# Patient Record
Sex: MALE | Race: WHITE | NOT HISPANIC OR LATINO | Employment: FULL TIME | URBAN - METROPOLITAN AREA
[De-identification: names, ages, dates, MRNs, and addresses within clinical notes are randomized per-mention and may not be internally consistent; named-entity substitution may affect disease eponyms.]

---

## 2017-01-22 ENCOUNTER — HOSPITAL ENCOUNTER (EMERGENCY)
Facility: CLINIC | Age: 37
Discharge: HOME | End: 2017-01-22
Attending: EMERGENCY MEDICINE | Admitting: EMERGENCY MEDICINE
Payer: COMMERCIAL

## 2017-01-22 VITALS
DIASTOLIC BLOOD PRESSURE: 114 MMHG | OXYGEN SATURATION: 93 % | BODY MASS INDEX: 39.58 KG/M2 | TEMPERATURE: 98.8 F | RESPIRATION RATE: 18 BRPM | WEIGHT: 315 LBS | HEART RATE: 92 BPM | SYSTOLIC BLOOD PRESSURE: 157 MMHG

## 2017-01-22 DIAGNOSIS — M54.50 ACUTE EXACERBATION OF CHRONIC LOW BACK PAIN: ICD-10-CM

## 2017-01-22 DIAGNOSIS — G89.29 ACUTE EXACERBATION OF CHRONIC LOW BACK PAIN: ICD-10-CM

## 2017-01-22 PROCEDURE — 99284 EMERGENCY DEPT VISIT MOD MDM: CPT | Performed by: EMERGENCY MEDICINE

## 2017-01-22 PROCEDURE — 99283 EMERGENCY DEPT VISIT LOW MDM: CPT

## 2017-01-22 RX ORDER — OXYCODONE AND ACETAMINOPHEN 5; 325 MG/1; MG/1
1-2 TABLET ORAL EVERY 6 HOURS PRN
Qty: 15 TABLET | Refills: 0 | Status: SHIPPED | OUTPATIENT
Start: 2017-01-22 | End: 2017-01-24

## 2017-01-22 ASSESSMENT — ENCOUNTER SYMPTOMS: BACK PAIN: 1

## 2017-01-22 NOTE — ED AVS SNAPSHOT
[facility] Emergency Department    [address]     [place] [address]    Phone:  [phone]    Fax:  [phone]                                       Sidney Plasencia   MRN: [id number]    Department:  [facility] Emergency Department   Date of Visit:  1/22/2017           Patient Information     Date Of Birth          1980        Your diagnoses for this visit were:     Acute exacerbation of chronic low back pain        You were seen by Elia Phan MD.      Follow-up Information     Follow up with Abner Moreno MD. Call in 1 day.    Specialty:  Family Practice    Contact information:    [facility]  [address] [facility] [address]  [phone]          Follow up with Brandin Das MD.    Specialty:  Neurosurgery    Why:  here as needed for spine    Contact information:    [facility] AT   [address] [facility] [address]  [place] [address]  [phone]          Discharge Instructions         Neck/Back Pain: General    Both neck and back pain are usually caused by injury to the muscles or ligaments of the spine. Sometimes the disks that separate each bone of the spine may cause pain by pressing on a nearby nerve. Back and neck pain may appear after a sudden twisting/bending force (such as in a car accident), or sometimes after a simple awkward movement. In either case, muscle spasm is often present and adds to the pain.  Acute neck and back pain usually gets better in 1 to 2 weeks. Pain related to disk disease, arthritis in the spinal joints or spinal stenosis (narrowing of the spinal canal) can become chronic and last for months or years.  Back and neck pain are common problems. Most people feel better in 1 or 2 weeks, and most of the rest in 1 to 2 months. Most people can remain active.  Symptoms  People experience and describe pain differently.    Pain can be sharp, stabbing, shooting, aching, cramping, or burning    Movement, standing, bending,  "lifting, sitting, or walking may worsen the pain    Pain can be localized to one spot or area, or it can be more generalized    Pain can spread or radiate upwards, downwards, to the front, or go down your arms    Muscle spasm may occur.  Cause  Most of the time \"mechanical problems\" with the muscles or spine cause the pain. it is usually caused by an injury, whether known or not, to the muscles or ligaments. While illnesses can cause back pain, it is usually not caused by a serious illness. Pain is usually related to physical activity, whether sports, exercise, work, or normal activity. Sometimes it can occur without an identifiable cause. This can happen simply by stretching or moving wrong, without noting pain at the time. Other causes include:    Overexertion, lifting, pushing, pulling incorrectly or too aggressively.    Sudden twisting, bending or stretching from an accident (car or fall), or accidental movement.    Poor posture    Poor conditioning, lack of regular exercise    Spinal disc disease or arthritis    Stress    Pregnancy, or illness like appendicitis, bladder or kidney infection, pelvic infections   Home care    For neck pain: Use a comfortable pillow that supports the head and keeps the spine in a neutral position. The position of the head should not be tilted forward or backward.    When in bed, try to find a position of comfort. A firm mattress is best. Try lying flat on your back with pillows under your knees. You can also try lying on your side with your knees bent up towards your chest and a pillow between your knees.    At first, do not try to stretch out the sore spots. If there is a strain, it is not like the good soreness you get after exercising without an injury. In this case, stretching may make it worse.    Avoid prolonged sitting, long car rides or travel. This puts more stress on the lower back than standing or walking.    During the first 24 to 72 hours after an injury, apply an ice " pack to the painful area for 20 minutes and then remove it for 20 minutes over a period of 60 to 90 minutes or several times a day. As a safety precaution, do not use a heating pad at bedtime. Sleeping with a heating pad can lead to skin burns or tissue damage.    Ice and heat therapies can be alternated. Talk with your health care provider about the best treatment for your back or neck pain.    Therapeutic massage can help relax the back and neck muscles without stretching them.    Be aware of safe lifting methods and do not lift anything over 15 pounds until all the pain is gone.  Medications  Talk to your health care provider before using medications, especially if you have other medical problems or are taking other medicines.    You may use acetaminophen (such as Tylenol) or ibuprofen (such as Advil or Motrin) to control pain, unless another pain medicine was prescribed. If you have chronic conditions like diabetes, liver or kidney disease, stomach ulcers,  gastrointestinal bleeding, or are taking blood thinner medications.    Be careful if you are given pain medicines, narcotics, or medication for muscle spasm. They can cause drowsiness, and can affect your coordination, reflexes, and judgment. Do not drive or operate heavy machinery.  Follow-up care  Follow up with your health care provider if your symptoms do not start to improve after one week. Physical therapy or further tests may be needed.  If X-rays were taken, they will be reviewed by a radiologist. You will be notified of any new findings that may affect your care.  Call 911  Seek emergency medical care if any of the following occur:    Trouble breathing    Confusion    Very drowsy or trouble awakening    Fainting or loss of consciousness    Rapid or very slow heart rate    Loss of bowel or bladder control  When to seek medical care  Get prompt medical attention if any of the following occur:    Pain becomes worse or spreads into your arms or  legs    Weakness, numbness or pain in one or both arms or legs    Numbness in the groin area    Difficulty walking    Fever of 100.4 F (38 C) or higher, or as directed by your healthcare provider    9009-2003 [employer]. [address] [place] [address], [place] [address]. All rights reserved. This information is not intended as a substitute for professional medical care. Always follow your healthcare professional's instructions.      Please try to handle any further pain medication needs through Dr. Moreno.      24 Hour Appointment Hotline       To make an appointment at any [facility], call [phone] ([phone]). If you don't have a family doctor or clinic, we will help you find one. [facility] clinics are conveniently located to serve the needs of you and your family.             Review of your medicines      START taking        Dose / Directions Last dose taken    oxyCODONE-acetaminophen 5-325 MG per tablet   Commonly known as:  PERCOCET   Dose:  1-2 tablet   Quantity:  15 tablet        Take 1-2 tablets by mouth every 6 hours as needed for moderate to severe pain   Refills:  0                Prescriptions were sent or printed at these locations (1 Prescription)                   Other Prescriptions                Printed at Department/Unit printer (1 of 1)         oxyCODONE-acetaminophen (PERCOCET) 5-325 MG per tablet                Orders Needing Specimen Collection     None      Pending Results     No orders found from 1/21/2017 to 1/23/2017.            Pending Culture Results     No orders found from 1/21/2017 to 1/23/2017.            Thank you for choosing [facility]       Thank you for choosing [facility] for your care. Our goal is always to provide you with excellent care. Hearing back from our patients is one way we can continue to improve our services. Please take a few minutes to complete the written survey that you may receive in the mail after you visit with us. Thank you!       "  MyChart Information     [employer] lets you send messages to your doctor, view your test results, renew your prescriptions, schedule appointments and more. To sign up, go to [email or web address] . Click on \"Log in\" on the left side of the screen, which will take you to the Welcome page. Then click on \"Sign up Now\" on the right side of the page.     You will be asked to enter the access code listed below, as well as some personal information. Please follow the directions to create your username and password.     Your access code is: [email or web address]  Expires: 3/18/2017 11:24 AM     Your access code will  in 90 days. If you need help or a new code, please call your [facility] clinic or [phone].        Care EveryWhere ID     This is your Care EveryWhere ID. This could be used by other organizations to access your [facility] medical records  [id number]        After Visit Summary       This is your record. Keep this with you and show to your community pharmacist(s) and doctor(s) at your next visit.                  "

## 2017-01-22 NOTE — ED AVS SNAPSHOT
[facility] Emergency Department    [address] [place] [address]    Phone:  [phone]    Fax:  [phone]                                       Sidney Plasencia   MRN: [id number]    Department:  [facility] Emergency Department   Date of Visit:  1/22/2017           After Visit Summary Signature Page     I have received my discharge instructions, and my questions have been answered. I have discussed any challenges I see with this plan with the nurse or doctor.    ..........................................................................................................................................  Patient/Patient Representative Signature      ..........................................................................................................................................  Patient Representative Print Name and Relationship to Patient    ..................................................               ................................................  Date                                            Time    ..........................................................................................................................................  Reviewed by Signature/Title    ...................................................              ..............................................  Date                                                            Time

## 2017-01-23 ENCOUNTER — CARE COORDINATION (OUTPATIENT)
Dept: CARE COORDINATION | Facility: CLINIC | Age: 37
End: 2017-01-23

## 2017-01-23 NOTE — PROGRESS NOTES
Clinic Care Coordination Contact  [facility]/Voicemail    Referral Source: ED Follow-Up    Clinical Data: Care Coordinator Outreach: Chronic back pain     Outreach attempted x 1.  Left message on voicemail with call back information and requested return call.    Plan: Care Coordinator will mail out care coordination introduction letter with care coordinator contact information and explanation of care coordination services. Care Coordinator will try to reach patient again in 1-2 business days.      Anusha Palacios RN BSN, PHN RN Care Coordinator  [facility]  [email or web address]  [phone]  1/23/2017 9:54 AM

## 2017-01-23 NOTE — Clinical Note
[address]   [address]    January 23, 2017    Sidney Plasencia  [address]  [facility] [address]    Dear Sidney,  I am the Clinic Care Coordinator that works with your primary care provider's clinic. I wanted to introduce myself and provide you with my contact information for you to be able to call me with any questions or concerns. Below is a description of what Clinic Care Coordination is and how I can further assist you.     The Clinic Care Coordinator role is a Registered Nurse and/or  who understands the health care system. The goal of Clinic Care Coordination is to help you manage your health and improve access to the [facility] in the most efficient manner.  The Registered Nurse can assist you in meeting your health care goals by providing education, coordinating services, and strengthening the communication among your providers. The  can assist you with financial, behavioral, psychosocial, and chemical dependency and counseling/psychiatric resources.    Please feel free to keep this letter and contact information to contact me at [phone] with any further questions or concerns that may arise. We at [facility] are focused on providing you with the highest-quality healthcare experience possible and that all starts with you.       Sincerely,     Anusha Palacios RN BSN, PHN RN Care Coordinator  [facility]  [email or web address]  [phone]  1/23/2017 9:56 AM      Enclosed: I have enclosed a copy of a 24 Hour Access Plan. This has helpful phone numbers for you to call when needed. Please keep this in an easy to access place to use as needed.

## 2017-01-23 NOTE — DISCHARGE INSTRUCTIONS
"  Neck/Back Pain: General    Both neck and back pain are usually caused by injury to the muscles or ligaments of the spine. Sometimes the disks that separate each bone of the spine may cause pain by pressing on a nearby nerve. Back and neck pain may appear after a sudden twisting/bending force (such as in a car accident), or sometimes after a simple awkward movement. In either case, muscle spasm is often present and adds to the pain.  Acute neck and back pain usually gets better in 1 to 2 weeks. Pain related to disk disease, arthritis in the spinal joints or spinal stenosis (narrowing of the spinal canal) can become chronic and last for months or years.  Back and neck pain are common problems. Most people feel better in 1 or 2 weeks, and most of the rest in 1 to 2 months. Most people can remain active.  Symptoms  People experience and describe pain differently.    Pain can be sharp, stabbing, shooting, aching, cramping, or burning    Movement, standing, bending, lifting, sitting, or walking may worsen the pain    Pain can be localized to one spot or area, or it can be more generalized    Pain can spread or radiate upwards, downwards, to the front, or go down your arms    Muscle spasm may occur.  Cause  Most of the time \"mechanical problems\" with the muscles or spine cause the pain. it is usually caused by an injury, whether known or not, to the muscles or ligaments. While illnesses can cause back pain, it is usually not caused by a serious illness. Pain is usually related to physical activity, whether sports, exercise, work, or normal activity. Sometimes it can occur without an identifiable cause. This can happen simply by stretching or moving wrong, without noting pain at the time. Other causes include:    Overexertion, lifting, pushing, pulling incorrectly or too aggressively.    Sudden twisting, bending or stretching from an accident (car or fall), or accidental movement.    Poor posture    Poor conditioning, " lack of regular exercise    Spinal disc disease or arthritis    Stress    Pregnancy, or illness like appendicitis, bladder or kidney infection, pelvic infections   Home care    For neck pain: Use a comfortable pillow that supports the head and keeps the spine in a neutral position. The position of the head should not be tilted forward or backward.    When in bed, try to find a position of comfort. A firm mattress is best. Try lying flat on your back with pillows under your knees. You can also try lying on your side with your knees bent up towards your chest and a pillow between your knees.    At first, do not try to stretch out the sore spots. If there is a strain, it is not like the good soreness you get after exercising without an injury. In this case, stretching may make it worse.    Avoid prolonged sitting, long car rides or travel. This puts more stress on the lower back than standing or walking.    During the first 24 to 72 hours after an injury, apply an ice pack to the painful area for 20 minutes and then remove it for 20 minutes over a period of 60 to 90 minutes or several times a day. As a safety precaution, do not use a heating pad at bedtime. Sleeping with a heating pad can lead to skin burns or tissue damage.    Ice and heat therapies can be alternated. Talk with your health care provider about the best treatment for your back or neck pain.    Therapeutic massage can help relax the back and neck muscles without stretching them.    Be aware of safe lifting methods and do not lift anything over 15 pounds until all the pain is gone.  Medications  Talk to your health care provider before using medications, especially if you have other medical problems or are taking other medicines.    You may use acetaminophen (such as Tylenol) or ibuprofen (such as Advil or Motrin) to control pain, unless another pain medicine was prescribed. If you have chronic conditions like diabetes, liver or kidney disease, stomach  ulcers,  gastrointestinal bleeding, or are taking blood thinner medications.    Be careful if you are given pain medicines, narcotics, or medication for muscle spasm. They can cause drowsiness, and can affect your coordination, reflexes, and judgment. Do not drive or operate heavy machinery.  Follow-up care  Follow up with your health care provider if your symptoms do not start to improve after one week. Physical therapy or further tests may be needed.  If X-rays were taken, they will be reviewed by a radiologist. You will be notified of any new findings that may affect your care.  Call 911  Seek emergency medical care if any of the following occur:    Trouble breathing    Confusion    Very drowsy or trouble awakening    Fainting or loss of consciousness    Rapid or very slow heart rate    Loss of bowel or bladder control  When to seek medical care  Get prompt medical attention if any of the following occur:    Pain becomes worse or spreads into your arms or legs    Weakness, numbness or pain in one or both arms or legs    Numbness in the groin area    Difficulty walking    Fever of 100.4 F (38 C) or higher, or as directed by your healthcare provider    9580-2611 [employer]. [address] [place] [address]. All rights reserved. This information is not intended as a substitute for professional medical care. Always follow your healthcare professional's instructions.      Please try to handle any further pain medication needs through Dr. Moreno.

## 2017-01-23 NOTE — Clinical Note
EMERGENCY CARE PLAN  Presenting Problem Signs and Symptoms Treatment Plan   Questions/concerns during clinic hours    I will call the clinic directly:   [facility]  [phone]   Questions/concerns outside clinic hours   I will call the 24 hour nurse line:  [phone]   Patient needs to schedule an appointment   I will call the 24 hour scheduling team:  [phone] or  [facility]  [phone]   Same day treatment    I will call the clinic first:  [phone],   Nurse line if after hours:  [phone],   Urgent care and express care if needed   Crisis Services: Behavioral or Mental Health Thoughts of harming self or others. Crisis Connection 24 Hour Phone Line  [phone]    [facility]al Health Providers[facility] [phone]  [facility]   [phone]

## 2017-01-23 NOTE — ED NOTES
Pt c/o chronic back pain from a work injury that has gotten worse over the last few months.  Is hoping to get some steroids and feel a little better before his sons field trip on Wednesday.

## 2017-01-23 NOTE — ED PROVIDER NOTES
"  History     Chief Complaint   Patient presents with     Back Pain     The history is provided by the patient.     Sidney Plasencia is a 36 year old male with a history of chronic back pain who presents to the emergency department with low back pain. He states that 2 years ago he had a \"work comp surgery\" where they repaired 2 out of 3 herniated discs. Patient reports since that time he has come in every 6 months to get a steroid pack for his pain. He states these steroids dull the pain but don't fully alleviate the pain. Patient has had two steroid packs since November and wants one because he is going on a field trip with his son on Wednesday. He states that he is currently out of his pain medication and Tylenol, ibuprofen or Flexeril does not alleviate his symptoms. Patient denies loss of bowel or bladder. He notes his back pain worsens with sitting. Patient believes his last MRI was prior to his surgery. He has discussed his problem with his primary care provider but he is waiting to hear from work comp to decide what can be done.     I have reviewed the Medications, Allergies, Past Medical and Surgical History, and Social History in the Epic system.    Patient Active Problem List   Diagnosis     Back injury     Pilonidal cyst with abscess likely     Past Medical History   Diagnosis Date     NONSPECIFIC MEDICAL HISTORY 2/2003     lumabr spine film-degen. disc. disease L5-S1       Past Surgical History   Procedure Laterality Date     C appendectomy  2000     Hc remove tonsils/adenoids,<13 y/o  1990     T & A <12y.o.     Inject epidural transforaminal  3/13/2014     Procedure: INJECT EPIDURAL TRANSFORAMINAL;  transforaminal epidural steroid injection right lumbar 4-5, lumbar 5-sacral1;  Surgeon: Emmett Galvez MD;  Location:  OR       History reviewed. No pertinent family history.    Social History   Substance Use Topics     Smoking status: Current Every Day Smoker -- 1.00 packs/day     Types: Cigarettes     " Smokeless tobacco: Never Used     Alcohol Use: 0.0 oz/week     0 Standard drinks or equivalent per week        Immunization History   Administered Date(s) Administered     Influenza (IIV3) 11/30/2012     Influenza Vaccine IM 3yrs+ 4 Valent IIV4 10/24/2013, 11/05/2015, 11/21/2016     Influenza Vaccine, 3 YRS +, IM (QUADRIVALENT W/PRESERVATIVES) 11/21/2014     Tdap (Adacel,Boostrix) 12/01/2005          Allergies   Allergen Reactions     No Known Drug Allergies        Current Outpatient Prescriptions   Medication Sig Dispense Refill     oxyCODONE-acetaminophen (PERCOCET) 5-325 MG per tablet Take 1-2 tablets by mouth every 6 hours as needed for moderate to severe pain 15 tablet 0     Review of Systems   Genitourinary: Negative for enuresis.   Musculoskeletal: Positive for back pain.   All other systems reviewed and are negative.      Physical Exam   BP: (!) 157/114 mmHg  Pulse: 92  Temp: 98.8  F (37.1  C)  Resp: 18  Weight: (!) 147.419 kg (325 lb)  SpO2: 93 %  Physical Exam   Constitutional: He is oriented to person, place, and time. He appears well-developed and well-nourished.   HENT:   Head: Normocephalic and atraumatic.   Eyes: Conjunctivae and EOM are normal.   Neck: Normal range of motion.   Cardiovascular: Normal rate, regular rhythm and normal heart sounds.    Pulmonary/Chest: Effort normal and breath sounds normal.   Abdominal: Soft. Bowel sounds are normal.   Musculoskeletal:        Lumbar back: He exhibits decreased range of motion and pain.   Midline lower lumbar incision without signs of infection.  No ecchymosis, swelling, or erythema noted per   Neurological: He is alert and oriented to person, place, and time.   Strength intact to lower extremities.    Skin: Skin is warm and dry.   Psychiatric: He has a normal mood and affect. His behavior is normal.   Nursing note and vitals reviewed.      ED Course   Procedures             Critical Care time:  none               No results found for this or any  previous visit (from the past 24 hour(s)).    Medications - No data to display    Assessments & Plan (with Medical Decision Making)  36-year-old male with a history of chronic back pain and previous discectomies who presents with exacerbation of chronic pain in the back.  He is in the process of pursuing this through Worker's Comp.  In the interim have prescribed oxycodone as needed.  Also emphasized with him that he needs to try and handle further prescription refills through his primary physician, Dr. Agudelo.       I have reviewed the nursing notes.    I have reviewed the findings, diagnosis, plan and need for follow up with the patient.    New Prescriptions    OXYCODONE-ACETAMINOPHEN (PERCOCET) 5-325 MG PER TABLET    Take 1-2 tablets by mouth every 6 hours as needed for moderate to severe pain       Final diagnoses:   Acute exacerbation of chronic low back pain   This document serves as a record of services personally performed by Elia Phan MD. It was created on their behalf by Alysha Romero, a trained medical scribe. The creation of this record is based on the provider's personal observations and the statements of the patient. This document has been checked and approved by the attending provider.     Note: Chart documentation done in part with Dragon Voice Recognition software. Although reviewed after completion, some word and grammatical errors may remain.    1/22/2017   [facility] EMERGENCY DEPARTMENT      Elia Phan MD  01/22/17 1959

## 2017-01-24 DIAGNOSIS — M54.16 LUMBAR RADICULAR PAIN: Primary | ICD-10-CM

## 2017-01-24 RX ORDER — OXYCODONE AND ACETAMINOPHEN 5; 325 MG/1; MG/1
1-2 TABLET ORAL EVERY 6 HOURS PRN
Qty: 15 TABLET | Refills: 0 | Status: SHIPPED | OUTPATIENT
Start: 2017-01-24 | End: 2017-02-14

## 2017-01-24 RX ORDER — METHYLPREDNISOLONE 4 MG
TABLET, DOSE PACK ORAL
Qty: 21 TABLET | Refills: 0 | Status: SHIPPED | OUTPATIENT
Start: 2017-01-24 | End: 2017-02-14

## 2017-01-24 NOTE — NURSING NOTE
rx for Percocet walked to [facility] pharmacy on 01/24/17.  Tawnya Means, [facility]

## 2017-01-25 NOTE — PROGRESS NOTES
Clinic Care Coordination Contact  [facility]/Voicemail    Referral Source: ED Follow-Up    Clinical Data: Care Coordinator Outreach    Outreach attempted x 2.  Left message on voicemail with call back information and requested return call.    Plan: Care Coordinator mailed out care coordination introduction letter on 1/23/17. Care Coordinator will do no further outreaches at this time.      Anusha Palacios RN BSN, PHN RN Care Coordinator  [facility]  [email or web address]  [phone]  1/25/2017 9:44 AM

## 2017-02-14 ENCOUNTER — OFFICE VISIT (OUTPATIENT)
Dept: FAMILY MEDICINE | Facility: CLINIC | Age: 37
End: 2017-02-14
Payer: COMMERCIAL

## 2017-02-14 VITALS
RESPIRATION RATE: 14 BRPM | SYSTOLIC BLOOD PRESSURE: 148 MMHG | BODY MASS INDEX: 40.43 KG/M2 | DIASTOLIC BLOOD PRESSURE: 76 MMHG | WEIGHT: 315 LBS | HEART RATE: 99 BPM | OXYGEN SATURATION: 95 % | HEIGHT: 74 IN | TEMPERATURE: 98.1 F

## 2017-02-14 DIAGNOSIS — L03.317 CELLULITIS OF BUTTOCK: ICD-10-CM

## 2017-02-14 DIAGNOSIS — Z23 NEED FOR VACCINATION: Primary | ICD-10-CM

## 2017-02-14 DIAGNOSIS — L05.01 PILONIDAL CYST WITH ABSCESS: ICD-10-CM

## 2017-02-14 PROCEDURE — 90471 IMMUNIZATION ADMIN: CPT | Performed by: FAMILY MEDICINE

## 2017-02-14 PROCEDURE — 90715 TDAP VACCINE 7 YRS/> IM: CPT | Performed by: FAMILY MEDICINE

## 2017-02-14 PROCEDURE — 10060 I&D ABSCESS SIMPLE/SINGLE: CPT | Performed by: FAMILY MEDICINE

## 2017-02-14 RX ORDER — OXYCODONE AND ACETAMINOPHEN 5; 325 MG/1; MG/1
1 TABLET ORAL EVERY 4 HOURS PRN
Qty: 25 TABLET | Refills: 0 | Status: SHIPPED | OUTPATIENT
Start: 2017-02-14 | End: 2017-03-06

## 2017-02-14 ASSESSMENT — PAIN SCALES - GENERAL: PAINLEVEL: SEVERE PAIN (6)

## 2017-02-14 NOTE — NURSING NOTE
"Chief Complaint   Patient presents with     Derm Problem     top of buttocks. Has had this before. Noticed last Wednesday. Painful 6/10 when standing and 10/10 when sitting. Red, inside the skin so hard to tell how big it is.        Initial /76 (BP Location: Left arm, Patient Position: Other (Comments), Cuff Size: Adult Large)  Pulse 99  Temp 98.1  F (36.7  C) (Tympanic)  Resp 14  Ht 6' 2.3\" (1.887 m)  Wt (!) 341 lb 4 oz (154.8 kg)  SpO2 95%  BMI 43.46 kg/m2 Estimated body mass index is 43.46 kg/(m^2) as calculated from the following:    Height as of this encounter: 6' 2.3\" (1.887 m).    Weight as of this encounter: 341 lb 4 oz (154.8 kg).  Medication Reconciliation: complete   Health Maintenance Due   Topic Date Due     LIPID SCREEN Q5 YR MALE (SYSTEM ASSIGNED)  08/06/2015     TETANUS IMMUNIZATION (SYSTEM ASSIGNED)  12/01/2015     Tawnya Means, [facility]      "

## 2017-02-14 NOTE — NURSING NOTE
Prior to injection verified patient identity using patient's name and date of birth.   Patient instructed to remain in clinic for 20 minutes afterwards and to report any adverse reaction to me immediately.  Tawnya Means, [facility]

## 2017-02-14 NOTE — MR AVS SNAPSHOT
After Visit Summary   2/14/2017    Sidney Plasencia    MRN: [id number]           Patient Information     Date Of Birth          1980        Visit Information        Provider Department      2/14/2017 10:20 AM Abner Moreno MD [facility]        Today's Diagnoses     Need for vaccination    -  1    Cellulitis of buttock        Pilonidal cyst with abscess likely           Follow-ups after your visit        Additional Services     GENERAL SURG ADULT REFERRAL       Your provider has referred you to: FMG: [facility] [phone]   [email or web address]/  FMG: [facility] [phone]   [email or web address]  FMG: [facility] [phone]   [email or web address]/  FMG: [facility] [phone]   [email or web address]/    Please be aware that coverage of these services is subject to the terms and limitations of your health insurance plan.  Call member services at your health plan with any benefit or coverage questions.      Please bring the following with you to your appointment:    (1) Any X-Rays, CTs or MRIs which have been performed.  Contact the facility where they were done to arrange for  prior to your scheduled appointment.   (2) List of current medications   (3) This referral request   (4) Any documents/labs given to you for this referral                  Your next 10 appointments already scheduled     Feb 21, 2017  8:30 AM CST   New Visit with Dexter Mendez MD   [facility] ([facility])    [address] [place] [id number] [phone]              Who to contact     If you have questions or need follow up information about today's clinic visit or your schedule please contact [facility]  "[place] directly at [phone].  Normal or non-critical lab and imaging results will be communicated to you by Recorded Futurehart, letter or phone within 4 business days after the clinic has received the results. If you do not hear from us within 7 days, please contact the clinic through Recorded Futurehart or phone. If you have a critical or abnormal lab result, we will notify you by phone as soon as possible.  Submit refill requests through Across [employer] Universe or call your pharmacy and they will forward the refill request to us. Please allow 3 business days for your refill to be completed.          Additional Information About Your Visit        Recorded FutureharSkycatch Information     Ac[employer] The Universe lets you send messages to your doctor, view your test results, renew your prescriptions, schedule appointments and more. To sign up, go to [email or web address] . Click on \"Log in\" on the left side of the screen, which will take you to the Welcome page. Then click on \"Sign up Now\" on the right side of the page.     You will be asked to enter the access code listed below, as well as some personal information. Please follow the directions to create your username and password.     Your access code is: [email or web address]  Expires: 3/18/2017 11:24 AM     Your access code will  in 90 days. If you need help or a new code, please call your [place] clinic or [phone].        Care EveryWhere ID     This is your Care EveryWhere ID. This could be used by other organizations to access your [place] medical records  [id number]        Your Vitals Were     Pulse Temperature Respirations Height Pulse Oximetry BMI (Body Mass Index)    99 98.1  F (36.7  C) (Tympanic) 14 6' 2.3\" (1.887 m) 95% 43.46 kg/m2       Blood Pressure from Last 3 Encounters:   17 148/76   17 (!) 157/114   16 (!) 146/109    Weight from Last 3 Encounters:   17 (!) 341 lb 4 oz (154.8 kg)   17 (!) 325 lb (147.4 kg)   16 (!) 325 lb (147.4 kg)              We Performed the " Following     1st  Administration  [08106]     GENERAL SURG ADULT REFERRAL     SCREENING QUESTIONS FOR ADULT IMMUNIZATIONS     TDAP (ADACEL AGES 11-64) [15431.002]          Today's Medication Changes          These changes are accurate as of: 2/14/17 11:42 AM.  If you have any questions, ask your nurse or doctor.               Start taking these medicines.        Dose/Directions    amoxicillin-clavulanate 875-125 MG per tablet   Commonly known as:  AUGMENTIN   Used for:  Cellulitis of buttock   Started by:  Abner Moreno MD        Dose:  1 tablet   Take 1 tablet by mouth 2 times daily   Quantity:  20 tablet   Refills:  0       oxyCODONE-acetaminophen 5-325 MG per tablet   Commonly known as:  PERCOCET   Used for:  Cellulitis of buttock   Started by:  Abner Moreno MD        Dose:  1 tablet   Take 1 tablet by mouth every 4 hours as needed for pain maximum 4 tablet(s) per day   Quantity:  25 tablet   Refills:  0            Where to get your medicines      These medications were sent to [facility] [address]   [address] [facility] [address]     Phone:  [phone]     amoxicillin-clavulanate 875-125 MG per tablet         Some of these will need a paper prescription and others can be bought over the counter.  Ask your nurse if you have questions.     Bring a paper prescription for each of these medications     oxyCODONE-acetaminophen 5-325 MG per tablet                Primary Care Provider Office Phone # Fax #    Abner Moreno -[phone]       [facility] [address] [facility] [address]        Thank you!     Thank you for choosing [facility]  for your care. Our goal is always to provide you with excellent care. Hearing back from our patients is one way we can continue to improve our services. Please take a few minutes to complete the written survey that you may receive in the mail after your visit with us. Thank  you!             Your Updated Medication List - Protect others around you: Learn how to safely use, store and throw away your medicines at www.disposemymeds.org.          This list is accurate as of: 2/14/17 11:42 AM.  Always use your most recent med list.                   Brand Name Dispense Instructions for use    amoxicillin-clavulanate 875-125 MG per tablet    AUGMENTIN    20 tablet    Take 1 tablet by mouth 2 times daily       oxyCODONE-acetaminophen 5-325 MG per tablet    PERCOCET    25 tablet    Take 1 tablet by mouth every 4 hours as needed for pain maximum 4 tablet(s) per day

## 2017-02-16 ENCOUNTER — OFFICE VISIT (OUTPATIENT)
Dept: FAMILY MEDICINE | Facility: CLINIC | Age: 37
End: 2017-02-16
Payer: COMMERCIAL

## 2017-02-16 VITALS
WEIGHT: 315 LBS | SYSTOLIC BLOOD PRESSURE: 150 MMHG | TEMPERATURE: 98.1 F | RESPIRATION RATE: 12 BRPM | DIASTOLIC BLOOD PRESSURE: 86 MMHG | OXYGEN SATURATION: 95 % | HEART RATE: 100 BPM | BODY MASS INDEX: 42.93 KG/M2

## 2017-02-16 DIAGNOSIS — L05.01 PILONIDAL ABSCESS: Primary | ICD-10-CM

## 2017-02-16 PROCEDURE — 10080 I&D PILONIDAL CYST SIMPLE: CPT | Performed by: FAMILY MEDICINE

## 2017-02-16 ASSESSMENT — PAIN SCALES - GENERAL: PAINLEVEL: WORST PAIN (10)

## 2017-02-16 NOTE — PROGRESS NOTES
SUBJECTIVE:  Mr. Sidney Steele is here today, states his pilonidal cyst has gotten much worse over the past couple of days.  We did start him on some antibiotics and some pain medication because the tissue was just indurated; now he thinks there is some fluctuance.  It will need to be opened and drained.   Informed consent was obtained.        PROCEDURE:  The patient was taken back to the procedure room, there was a 3 x 3 cm area of fluctuance in the upper crease of the buttocks.  This area was prepped with alcohol, anesthetized with 1% lidocaine with epinephrine.  A 2 cm incision was made into the cyst, there was a copious amount of purulent material expressed.  A 1/2 inch iodoform wick was placed approximately 3-4 cm in the cyst itself.  Sterile dressing was applied.      ASSESSMENT:  Incision and drainage of pilonidal cyst.      PLAN:  The patient will stay on his antibiotics.  He does have pain meds.  He will do tub soaks b.i.d. does have a followup with surgery on Tuesday.  If the wick falls out over the weekend and it reaccumulates he may need it re-opened, but he can leave it in until he sees surgery to allow it to continue to drain.         BETSY VILLAGOMEZ MD             D: 2017 13:00   T: 2017 13:36   MT: [id number]      Name:     SIDNEY STEELE   MRN:      [id number]        Account:      [id number]   :      1980           Visit Date:   2017      Document: [id number]

## 2017-02-16 NOTE — MR AVS SNAPSHOT
"              After Visit Summary   2/16/2017    Sidney Plasencia    MRN: [id number]           Patient Information     Date Of Birth          1980        Visit Information        Provider Department      2/16/2017 8:00 AM Abner Moreno MD; NL PROC ROOM ONE [facility]        Today's Diagnoses     Pilonidal abscess    -  1       Follow-ups after your visit        Your next 10 appointments already scheduled     Feb 21, 2017  8:30 AM CST   New Visit with Dexter Mendez MD   [facility]    [address] [place] [id number] [phone]              Who to contact     If you have questions or need follow up information about today's clinic visit or your schedule please contact [facility] directly at [phone].  Normal or non-critical lab and imaging results will be communicated to you by SMTDP Technologyhart, letter or phone within 4 business days after the clinic has received the results. If you do not hear from us within 7 days, please contact the clinic through MyChart or phone. If you have a critical or abnormal lab result, we will notify you by phone as soon as possible.  Submit refill requests through CitiVox or call your pharmacy and they will forward the refill request to us. Please allow 3 business days for your refill to be completed.          Additional Information About Your Visit        MyChart Information     CitiVox lets you send messages to your doctor, view your test results, renew your prescriptions, schedule appointments and more. To sign up, go to [email or web address] . Click on \"Log in\" on the left side of the screen, which will take you to the Welcome page. Then click on \"Sign up Now\" on the right side of the page.     You will be asked to enter the access code listed below, as well as some personal information. Please follow the directions to create your username and password.     Your access code " is: [email or web address]  Expires: 3/18/2017 11:24 AM     Your access code will  in 90 days. If you need help or a new code, please call your [place] clinic or [phone].        Care EveryWhere ID     This is your Care EveryWhere ID. This could be used by other organizations to access your [place] medical records  [id number]        Your Vitals Were     Pulse Temperature Respirations Pulse Oximetry BMI (Body Mass Index)       100 98.1  F (36.7  C) (Tympanic) 12 95% 42.93 kg/m2        Blood Pressure from Last 3 Encounters:   17 150/86   17 148/76   17 (!) 157/114    Weight from Last 3 Encounters:   17 (!) 337 lb 1.9 oz (152.9 kg)   17 (!) 341 lb 4 oz (154.8 kg)   17 (!) 325 lb (147.4 kg)              We Performed the Following     DRAIN PILONIDAL CYST SIMPLE        Primary Care Provider Office Phone # Fax #    Abner Moreno [phone]       [facility] [address] [place] [address]        Thank you!     Thank you for choosing [facility]  for your care. Our goal is always to provide you with excellent care. Hearing back from our patients is one way we can continue to improve our services. Please take a few minutes to complete the written survey that you may receive in the mail after your visit with us. Thank you!             Your Updated Medication List - Protect others around you: Learn how to safely use, store and throw away your medicines at www.disposemymeds.org.          This list is accurate as of: 17  8:48 AM.  Always use your most recent med list.                   Brand Name Dispense Instructions for use    amoxicillin-clavulanate 875-125 MG per tablet    AUGMENTIN    20 tablet    Take 1 tablet by mouth 2 times daily       oxyCODONE-acetaminophen 5-325 MG per tablet    PERCOCET    25 tablet    Take 1 tablet by mouth every 4 hours as needed for pain maximum 4 tablet(s) per day

## 2017-02-16 NOTE — PROGRESS NOTES
[facility] INFORMED CONSENT FOR CLINIC OUTPATIENT PROCEDURES    PROCEDURE NAME: Cyst incision and drainage    The following benefits were discussed with the patient:  Aid in diagnosis  Improved quality of life  Symptom relief  Treatment or management of pain    The likelihood of this procedure being successful: High    The reason why this procedure is being recommended: For comfort measures  For the treatment of pain  To improve the quality of life    The risks associated with this procedure include bleeding, infection, pain and poor cosmetic result.    Alternatives to this procedure include monitor condition or symptoms for a period of time.    Likely outcomes should the patient refuse the procedure include condition remains undiagnosed or untreated.    I have discussed these issues with Sidney Plasencia and all questions were answered.  He is agreeable and consents to the procedure mentioned above.    Abner Moreno MD

## 2017-02-16 NOTE — PROGRESS NOTES
SUBJECTIVE:  Mr. Sidney Steele called me yesterday and said that the pain from his pilonidal cyst was intensifying and now there was a small bulge in the area.  I did tell him we will most likely need to open it up and let it drain.  He came in this morning for incision and drainage.      PROCEDURE: Informed consent was obtained.  The patient was taken to the procedure room.  The patient had the skin overlying the pilonidal cyst prepped with alcohol.  The area which measured about 3 x 3 cm was anesthetized with 1% Xylocaine with epinephrine.  I did open the cyst up with a 2 cm incision over the cyst.  There was a large amount of purulent material expressed from the cyst.  About 5 cm of  Iodoform gauze was placed into the cyst to allow continued drainage.  Sterile dressing was applied.      ASSESSMENT:  Infected pilonidal cyst.      PLAN:  The patient will stay on his antibiotics as directed; will take pain medication that I gave him previously as directed.  I will also start him on 800 mg of ibuprofen 3 times daily.  He does have a Surgery followup on Tuesday.  I would like him to do tub soaks twice daily.  Hopefully the packing will stay in for the next 3-4 days.  If it falls out there is no concern unless the cyst starts to fill again;  he may need to have it reopened.         BETSY VILLAGOMEZ MD             D: 2017 08:48   T: 2017 10:41   MT: [id number]      Name:     SIDNEY STEELE   MRN:      [id number]        Account:      [id number]   :      1980           Visit Date:   2017      Document: [id number]

## 2017-02-16 NOTE — NURSING NOTE
"Chief Complaint   Patient presents with     Derm Problem     cyst buttocks       Initial /86 (BP Location: Left arm, Patient Position: Other (Comments), Cuff Size: Adult Large)  Pulse 100  Temp 98.1  F (36.7  C) (Tympanic)  Resp 12  Wt (!) 337 lb 1.9 oz (152.9 kg)  SpO2 95%  BMI 42.93 kg/m2 Estimated body mass index is 42.93 kg/(m^2) as calculated from the following:    Height as of 2/14/17: 6' 2.3\" (1.887 m).    Weight as of this encounter: 337 lb 1.9 oz (152.9 kg).  Medication Reconciliation: complete   Health Maintenance Due   Topic Date Due     LIPID SCREEN Q5 YR MALE (SYSTEM ASSIGNED)  08/06/2015     Tawnya Means, [facility]    '  "

## 2017-02-21 ENCOUNTER — TELEPHONE (OUTPATIENT)
Dept: SURGERY | Facility: OTHER | Age: 37
End: 2017-02-21

## 2017-02-21 ENCOUNTER — OFFICE VISIT (OUTPATIENT)
Dept: SURGERY | Facility: CLINIC | Age: 37
End: 2017-02-21
Payer: COMMERCIAL

## 2017-02-21 VITALS — HEART RATE: 87 BPM | BODY MASS INDEX: 43.67 KG/M2 | WEIGHT: 315 LBS | OXYGEN SATURATION: 100 %

## 2017-02-21 DIAGNOSIS — L05.91 INFECTED PILONIDAL CYST: Primary | ICD-10-CM

## 2017-02-21 PROCEDURE — 99243 OFF/OP CNSLTJ NEW/EST LOW 30: CPT | Performed by: SPECIALIST

## 2017-02-21 NOTE — MR AVS SNAPSHOT
"              After Visit Summary   2017    Sidney Plasencia    MRN: [id number]           Patient Information     Date Of Birth          1980        Visit Information        Provider Department      2017 8:30 AM Dexter Mendez MD [facility]        Today's Diagnoses     Infected pilonidal cyst    -  1       Follow-ups after your visit        Who to contact     If you have questions or need follow up information about today's clinic visit or your schedule please contact [facility] directly at [phone].  Normal or non-critical lab and imaging results will be communicated to you by Tipp24hart, letter or phone within 4 business days after the clinic has received the results. If you do not hear from us within 7 days, please contact the clinic through Tipp24hart or phone. If you have a critical or abnormal lab result, we will notify you by phone as soon as possible.  Submit refill requests through RobArt or call your pharmacy and they will forward the refill request to us. Please allow 3 business days for your refill to be completed.          Additional Information About Your Visit        MyChart Information     RobArt lets you send messages to your doctor, view your test results, renew your prescriptions, schedule appointments and more. To sign up, go to [email or web address] . Click on \"Log in\" on the left side of the screen, which will take you to the Welcome page. Then click on \"Sign up Now\" on the right side of the page.     You will be asked to enter the access code listed below, as well as some personal information. Please follow the directions to create your username and password.     Your access code is: [email or web address]  Expires: 3/18/2017 11:24 AM     Your access code will  in 90 days. If you need help or a new code, please call your [facility] or [phone].        Care EveryWhere ID     This is your Care EveryWhere ID. This could be used by other " organizations to access your [facility] medical records  [id number]        Your Vitals Were     Pulse Pulse Oximetry BMI (Body Mass Index)             87 100% 43.67 kg/m2          Blood Pressure from Last 3 Encounters:   02/16/17 150/86   02/14/17 148/76   01/22/17 (!) 157/114    Weight from Last 3 Encounters:   02/21/17 (!) 342 lb 14.4 oz (155.5 kg)   02/16/17 (!) 337 lb 1.9 oz (152.9 kg)   02/14/17 (!) 341 lb 4 oz (154.8 kg)              Today, you had the following     No orders found for display       Primary Care Provider Office Phone # Fax #    Abner Moreno [phone]       [facility] [address] [facility] [address]        Thank you!     Thank you for choosing [facility]  for your care. Our goal is always to provide you with excellent care. Hearing back from our patients is one way we can continue to improve our services. Please take a few minutes to complete the written survey that you may receive in the mail after your visit with us. Thank you!             Your Updated Medication List - Protect others around you: Learn how to safely use, store and throw away your medicines at www.disposemymeds.org.          This list is accurate as of: 2/21/17  9:02 AM.  Always use your most recent med list.                   Brand Name Dispense Instructions for use    amoxicillin-clavulanate 875-125 MG per tablet    AUGMENTIN    20 tablet    Take 1 tablet by mouth 2 times daily       oxyCODONE-acetaminophen 5-325 MG per tablet    PERCOCET    25 tablet    Take 1 tablet by mouth every 4 hours as needed for pain maximum 4 tablet(s) per day

## 2017-02-21 NOTE — PROGRESS NOTES
Consult requested by Dr. Moreno    Reason for for consultation - infected pilonidal cyst      HPI:  Patient is a 36-year-old who developed pain on his tailbone last week. He saw his PCP and was found to have a pilonidal abscess. He had incision and drainage in the clinic with much improvement. He is currently on Augmentin. He had a similar episode about a year ago which she had also had another incision and drainage. He now follows up for his infected pilonidal cyst.    Past Medical History   Diagnosis Date     NONSPECIFIC MEDICAL HISTORY 2/2003     lumabr spine film-degen. disc. disease L5-S1     Past Surgical History   Procedure Laterality Date     C appendectomy  2000     Hc remove tonsils/adenoids,<13 y/o  1990     T & A <12y.o.     Inject epidural transforaminal  3/13/2014     Procedure: INJECT EPIDURAL TRANSFORAMINAL;  transforaminal epidural steroid injection right lumbar 4-5, lumbar 5-sacral1;  Surgeon: Emmett Galvez MD;  Location:  OR     Current Outpatient Prescriptions   Medication     amoxicillin-clavulanate (AUGMENTIN) 875-125 MG per tablet     oxyCODONE-acetaminophen (PERCOCET) 5-325 MG per tablet     No current facility-administered medications for this visit.         Allergies   Allergen Reactions     No Known Drug Allergies      Social History   Substance Use Topics     Smoking status: Current Every Day Smoker     Packs/day: 1.00     Years: 20.00     Types: Cigarettes     Smokeless tobacco: Never Used     Alcohol use Not on file      Comment: rare     No family history on file.     ROS: 10 point ROS neg other than the symptoms noted above in the HPI.    PE:  B/P: Data Unavailable, T: Data Unavailable, P: 87, R: Data Unavailable  General: well developed, well nourished WM who appears his stated age  HEENT: NC/AT, EOMI, (-)icterus, (-)injection  Neck: Supple, No JVD  Chest: CTA  Heart: S1, S2, (-)m/r/g  Abd: Soft, non tender, non distended  Back: well healed I&D site.  Multiple crypts.  No  fluctuance.  Ext; Warm, no edema  Psych: AAOx3  Neuro: No focal deficits    Impression/plan  This is a 36-year-old gentleman with an infected pilonidal cyst recently had incision and drainage. The plan at this time is to have him finish his course of antibiotics and allow the inflammation then performed an elective excision in about 6 weeks.  The procedure, risks, benefits and alternatives discussed and he agrees to proceed. Smoking cessation was strongly encouraged.      Dexter Mendez MD, FACS

## 2017-02-21 NOTE — NURSING NOTE
"Chief Complaint   Patient presents with     Consult     pilondal cyst-referring Moreno       Initial Pulse 87  Wt (!) 342 lb 14.4 oz (155.5 kg)  SpO2 100%  BMI 43.67 kg/m2 Estimated body mass index is 43.67 kg/(m^2) as calculated from the following:    Height as of 2/14/17: 6' 2.3\" (1.887 m).    Weight as of this encounter: 342 lb 14.4 oz (155.5 kg).  Medication Reconciliation: complete    "

## 2017-02-21 NOTE — TELEPHONE ENCOUNTER
Surgery Scheduled    Date of Surgery 03/31/17Time of Surgery 1:30pm  Procedure: Excision of Pilonidal Cyst  Hospital/Surgical Facility: [facility]  Surgeon: Dr Mendez  Type of Anesthesia Anticipated: General  Pre-Op: 03/27/17 with Dr Moreno   Post-Op: 04/10/17 with Dr Mendez  Pre-Certification -to be completed  Consent Signed -to be completed  Hospital Stay -same day procedure    Surgery Packet (and/or) Colonscopy Prep (was given/or mailed) to patient. Patient was also instructed to arrive 1 hour(s) prior to surgery.  Patient understood and agrees to the plan.      Kaylene Harkins  Specialty

## 2017-02-27 NOTE — PROGRESS NOTES
SUBJECTIVE:  Sidney Steele is here today with complaints of pain at the superior portion of his buttocks.  He states he has had a history of pilonidal issues in the past.  Just noticed the pain has been unable to sit for a period of time and wanted to catch it before it became an abscess if possible.  No other complaints at this time.  No drainage.      OBJECTIVE:  Patient has a small indurated area measuring about 2-3 cm at the upper portion of his buttocks.  There is no fluctuance at this time.  There is no drainage.      ASSESSMENT:   1.  Cellulitis of the buttocks.      PLAN:  The patient was instructed to start his antibiotics as ordered.  If he develops increased swelling or area becomes fluctuant as it was explained to his wife so she could check he needs to report back to the clinic for incision and drainage and then follow up with a surgeon for definitive treatment.  If it is after hours he may need to report to the emergency room.         BETSY VILLAGOMEZ MD             D: 2017 13:37   T: 2017 16:25   MT: EVIE#[id number]      Name:     SIDNEY STEELE   MRN:      [id number]        Account:      [id number]   :      1980           Visit Date:   2017      Document: [id number]

## 2017-03-06 ENCOUNTER — OFFICE VISIT (OUTPATIENT)
Dept: FAMILY MEDICINE | Facility: CLINIC | Age: 37
End: 2017-03-06
Payer: COMMERCIAL

## 2017-03-06 VITALS
BODY MASS INDEX: 43.84 KG/M2 | TEMPERATURE: 98.8 F | HEART RATE: 103 BPM | WEIGHT: 315 LBS | SYSTOLIC BLOOD PRESSURE: 136 MMHG | RESPIRATION RATE: 16 BRPM | DIASTOLIC BLOOD PRESSURE: 78 MMHG | OXYGEN SATURATION: 96 %

## 2017-03-06 DIAGNOSIS — M54.41 CHRONIC RIGHT-SIDED LOW BACK PAIN WITH RIGHT-SIDED SCIATICA: ICD-10-CM

## 2017-03-06 DIAGNOSIS — S39.92XD BACK INJURY, SUBSEQUENT ENCOUNTER: Primary | ICD-10-CM

## 2017-03-06 DIAGNOSIS — G89.29 CHRONIC RIGHT-SIDED LOW BACK PAIN WITH RIGHT-SIDED SCIATICA: ICD-10-CM

## 2017-03-06 PROCEDURE — 99213 OFFICE O/P EST LOW 20 MIN: CPT | Performed by: FAMILY MEDICINE

## 2017-03-06 RX ORDER — OXYCODONE AND ACETAMINOPHEN 5; 325 MG/1; MG/1
1 TABLET ORAL EVERY 4 HOURS PRN
Qty: 25 TABLET | Refills: 0 | Status: SHIPPED | OUTPATIENT
Start: 2017-03-06 | End: 2017-03-23

## 2017-03-06 RX ORDER — METHYLPREDNISOLONE 4 MG
TABLET, DOSE PACK ORAL
Qty: 21 TABLET | Refills: 0 | Status: SHIPPED | OUTPATIENT
Start: 2017-03-06 | End: 2017-03-27

## 2017-03-06 ASSESSMENT — PAIN SCALES - GENERAL: PAINLEVEL: MODERATE PAIN (4)

## 2017-03-06 NOTE — PROGRESS NOTES
SUBJECTIVE:  Johanny Steele is here today for followup of a back injury, he sustained an injury at work, had an operation back in , has had intermittent pain since that time.  He has never completely healed.  He has been back to work in managing his pain with nonsteroidals occasionally using opioid.  States now that the pain is just getting unbearable again.  He states when he wakes up in the morning he has a difficult time moving, once he gets moving the pain is tolerable and he can get through the day and then but if he sits down at night for any extended period of time he has a very hard time getting up out of a chair.  No other complaints at this time such as permanent numbness, tingling or weakness.  He does have a little bit of radicular pain radiating down into the right buttocks from time to time.      OBJECTIVE:  Patient has full forward flexion, full extension, full rotation of the lumbar spine.  He has good muscle strength in all muscle groups tested in the lower extremities.  Reflexes appear to be symmetrical.  Gait is not nonantalgic at this time.      ASSESSMENT:     1.  Back injury, subsequent encounter.   2.  Low back pain with right-sided radiculopathy.      PLAN:  The patient will get another MRI to see if we get significant changes.  I hope he does not have to undergo another surgery.  He has tried physical therapy and all of the conservative approaches over the past couple of years and this has been unsuccessful.  Los is a very stoic young man who does not complain of pain, so I am concerned.         BETSY VILLAGOMEZ MD             D: 2017 15:04   T: 2017 15:18   MT: EVIE#[id number]      Name:     JOHANNY STEELE   MRN:      [id number]        Account:      [id number]   :      1980           Visit Date:   2017      Document: [id number]

## 2017-03-06 NOTE — NURSING NOTE
"Chief Complaint   Patient presents with     Back Pain     since last November.       Initial /78 (BP Location: Left arm, Patient Position: Other (Comments), Cuff Size: Adult Large)  Pulse 103  Temp 98.8  F (37.1  C) (Tympanic)  Resp 16  Wt (!) 344 lb 4 oz (156.2 kg)  SpO2 96%  BMI 43.84 kg/m2 Estimated body mass index is 43.84 kg/(m^2) as calculated from the following:    Height as of 2/14/17: 6' 2.3\" (1.887 m).    Weight as of this encounter: 344 lb 4 oz (156.2 kg).  Medication Reconciliation: complete   Health Maintenance Due   Topic Date Due     LIPID SCREEN Q5 YR MALE (SYSTEM ASSIGNED)  08/06/2015     Tawnya Means, [facility]      "

## 2017-03-06 NOTE — MR AVS SNAPSHOT
After Visit Summary   3/6/2017    Sidney Plasencia    MRN: [id number]           Patient Information     Date Of Birth          1980        Visit Information        Provider Department      3/6/2017 12:30 PM Abner Moreno MD [facility]        Today's Diagnoses     Back injury, subsequent encounter    -  1    Cellulitis of buttock           Follow-ups after your visit        Your next 10 appointments already scheduled     Mar 08, 2017  1:15 PM CST   MR LUMBAR SPINE W/O CONTRAST with PHMR1   [facility] ([facility])    [address] [address]   [phone]           Take your medicines as usual, unless your doctor tells you not to. Bring a list of your current medicines to your exam (including vitamins, minerals and over-the-counter drugs). Also bring the results of similar scans you may have had.  Please remove any body piercings and hair extensions before you arrive.  Follow your doctor s orders. If you do not, we may have to postpone your exam.  You will not have contrast for this exam. You do not need to do anything special to prepare.  The MRI machine uses a strong magnet. Please wear clothes without metal (snaps, zippers). A sweatsuit works well, or we may give you a hospital gown.   **IMPORTANT** THE INSTRUCTIONS BELOW ARE ONLY FOR THOSE PATIENTS WHO HAVE BEEN TOLD THEY WILL RECEIVE SEDATION OR GENERAL ANESTHESIA DURING THEIR MRI PROCEDURE:  IF YOU WILL RECEIVE SEDATION (take medicine to help you relax during your exam):   You must get the medicine from your doctor before you arrive. Bring the medicine to the exam. Do not take it at home.   Arrive one hour early. Bring someone who can take you home after the test. Your medicine will make you sleepy. After the exam, you may not drive, take a bus or take a taxi by yourself.   No eating 8 hours before your exam. You may have clear liquids up until 4 hours before your exam.  (Clear liquids include water, clear tea, black coffee and fruit juice without pulp.)  IF YOU WILL RECEIVE ANESTHESIA (be asleep for your exam):   Arrive 1 1/2 hours early. Bring someone who can take you home after the test. You may not drive, take a bus or take a taxi by yourself.   No eating 8 hours before your exam. You may have clear liquids up until 4 hours before your exam. (Clear liquids include water, clear tea, black coffee and fruit juice without pulp.)   You will spend four to five hours in the recovery room.  Please call the Imaging Department at your exam site with any questions.            Mar 27, 2017  6:30 PM CDT   [facility]    [address] [place] [phone] [phone]            Mar 31, 2017   Procedure with Dexter Mendez MD   [facility] ([facility])    [address] [place] [phone]           From [address]: Exit at [address] on south side of [place]. Turn right on [address]. Turn left at stoplight on [address]. [facility] will be in view two blocks ahead            Apr 10, 2017  9:00 AM CDT   [facility]   [facility]    [address] [place] [id number] [phone]              Future tests that were ordered for you today     Open Future Orders        Priority Expected Expires Ordered    MR Lumbar Spine w/o Contrast Routine  4/6/2017 3/6/2017            Who to contact     If you have questions or need follow up information about today's clinic visit or your schedule please contact [facility] directly at [phone].  Normal or non-critical lab and imaging results will be communicated to you by MyChart, letter or phone within 4 business days after the clinic has received the results. If you do not  "hear from us within 7 days, please contact the clinic through [employer] or phone. If you have a critical or abnormal lab result, we will notify you by phone as soon as possible.  Submit refill requests through [employer] or call your pharmacy and they will forward the refill request to us. Please allow 3 business days for your refill to be completed.          Additional Information About Your Visit        [email or web address] Information     [employer] lets you send messages to your doctor, view your test results, renew your prescriptions, schedule appointments and more. To sign up, go to [email or web address] . Click on \"Log in\" on the left side of the screen, which will take you to the Welcome page. Then click on \"Sign up Now\" on the right side of the page.     You will be asked to enter the access code listed below, as well as some personal information. Please follow the directions to create your username and password.     Your access code is: [email or web address]  Expires: 3/18/2017 11:24 AM     Your access code will  in 90 days. If you need help or a new code, please call your [place] clinic or [phone].        Care EveryWhere ID     This is your Care EveryWhere ID. This could be used by other organizations to access your [place] medical records  [id number]        Your Vitals Were     Pulse Temperature Respirations Pulse Oximetry BMI (Body Mass Index)       103 98.8  F (37.1  C) (Tympanic) 16 96% 43.84 kg/m2        Blood Pressure from Last 3 Encounters:   17 136/78   17 150/86   17 148/76    Weight from Last 3 Encounters:   17 (!) 344 lb 4 oz (156.2 kg)   17 (!) 342 lb 14.4 oz (155.5 kg)   17 (!) 337 lb 1.9 oz (152.9 kg)                 Today's Medication Changes          These changes are accurate as of: 3/6/17  1:16 PM.  If you have any questions, ask your nurse or doctor.               Start taking these medicines.        Dose/Directions    methylPREDNISolone 4 MG tablet   Commonly " known as:  MEDROL DOSEPAK   Used for:  Back injury, subsequent encounter   Started by:  Abner Moreno MD        Follow package instructions   Quantity:  21 tablet   Refills:  0            Where to get your medicines      These medications were sent to [facility], [place] - [address]   [address] , [facility] [address]     Phone:  [phone]     methylPREDNISolone 4 MG tablet         Some of these will need a paper prescription and others can be bought over the counter.  Ask your nurse if you have questions.     Bring a paper prescription for each of these medications     oxyCODONE-acetaminophen 5-325 MG per tablet                Primary Care Provider Office Phone # Fax #    Abner Moreno [phone]       [facility] [address] [facility] [address]        Thank you!     Thank you for choosing [facility]  for your care. Our goal is always to provide you with excellent care. Hearing back from our patients is one way we can continue to improve our services. Please take a few minutes to complete the written survey that you may receive in the mail after your visit with us. Thank you!             Your Updated Medication List - Protect others around you: Learn how to safely use, store and throw away your medicines at www.disposemymeds.org.          This list is accurate as of: 3/6/17  1:16 PM.  Always use your most recent med list.                   Brand Name Dispense Instructions for use    methylPREDNISolone 4 MG tablet    MEDROL DOSEPAK    21 tablet    Follow package instructions       oxyCODONE-acetaminophen 5-325 MG per tablet    PERCOCET    25 tablet    Take 1 tablet by mouth every 4 hours as needed for pain maximum 4 tablet(s) per day

## 2017-03-08 ENCOUNTER — HOSPITAL ENCOUNTER (OUTPATIENT)
Dept: MRI IMAGING | Facility: CLINIC | Age: 37
Discharge: HOME | End: 2017-03-08
Attending: FAMILY MEDICINE | Admitting: FAMILY MEDICINE
Payer: COMMERCIAL

## 2017-03-08 DIAGNOSIS — S39.92XD BACK INJURY, SUBSEQUENT ENCOUNTER: ICD-10-CM

## 2017-03-08 PROCEDURE — 72158 MRI LUMBAR SPINE W/O & W/DYE: CPT

## 2017-03-08 PROCEDURE — 25500064 ZZH RX 255 OP 636: Performed by: RADIOLOGY

## 2017-03-08 PROCEDURE — A9585 GADOBUTROL INJECTION: HCPCS | Performed by: RADIOLOGY

## 2017-03-08 RX ORDER — GADOBUTROL 604.72 MG/ML
10 INJECTION INTRAVENOUS ONCE
Status: COMPLETED | OUTPATIENT
Start: 2017-03-08 | End: 2017-03-08

## 2017-03-08 RX ADMIN — GADOBUTROL 10 ML: 604.72 INJECTION INTRAVENOUS at 13:44

## 2017-03-09 ENCOUNTER — TELEPHONE (OUTPATIENT)
Dept: FAMILY MEDICINE | Facility: CLINIC | Age: 37
End: 2017-03-09

## 2017-03-09 NOTE — TELEPHONE ENCOUNTER
----- Message from Abner Moreno MD sent at 3/9/2017  8:51 AM CST -----  Please call Los his discs are still herniated and pressing on the nerve roots, not as bad as before but significant I believe we ill have to wait for the spine doctors to make the call, please make sure he has a follow up with them

## 2017-03-09 NOTE — TELEPHONE ENCOUNTER
Patient informed and he is working with his Work Comp to get things figured out. When its figured out he will be getting into see the spine specialist.  Agnes SERRATO MA

## 2017-03-22 ENCOUNTER — TELEPHONE (OUTPATIENT)
Dept: FAMILY MEDICINE | Facility: CLINIC | Age: 37
End: 2017-03-22

## 2017-03-22 DIAGNOSIS — S39.92XD BACK INJURY, SUBSEQUENT ENCOUNTER: Primary | ICD-10-CM

## 2017-03-22 RX ORDER — MORPHINE SULFATE 15 MG/1
15 TABLET, FILM COATED, EXTENDED RELEASE ORAL EVERY 12 HOURS
Qty: 30 TABLET | Refills: 0 | Status: SHIPPED | OUTPATIENT
Start: 2017-03-22 | End: 2017-03-23 | Stop reason: ALTCHOICE

## 2017-03-22 NOTE — TELEPHONE ENCOUNTER
Spoke with patient about appointment he is scheduled with Neurosurgery on Thursday 03/30/17 @ 2:00pm with Sailaja lopez at the [facility].   Kaylene EDDY

## 2017-03-23 DIAGNOSIS — S39.92XD BACK INJURY, SUBSEQUENT ENCOUNTER: ICD-10-CM

## 2017-03-23 RX ORDER — OXYCODONE AND ACETAMINOPHEN 5; 325 MG/1; MG/1
TABLET ORAL
Qty: 25 TABLET | Refills: 0 | Status: SHIPPED | OUTPATIENT
Start: 2017-03-23 | End: 2018-02-14

## 2017-03-23 NOTE — TELEPHONE ENCOUNTER
Per Dr. Moreno he stated that the patient is not able to tolerate the the MS contin, would like to have this one filled please.     Kaylene EDDY

## 2017-03-27 ENCOUNTER — OFFICE VISIT (OUTPATIENT)
Dept: FAMILY MEDICINE | Facility: CLINIC | Age: 37
End: 2017-03-27
Payer: COMMERCIAL

## 2017-03-27 VITALS
BODY MASS INDEX: 43.84 KG/M2 | OXYGEN SATURATION: 100 % | TEMPERATURE: 98.3 F | HEART RATE: 93 BPM | RESPIRATION RATE: 16 BRPM | DIASTOLIC BLOOD PRESSURE: 80 MMHG | SYSTOLIC BLOOD PRESSURE: 138 MMHG | WEIGHT: 315 LBS

## 2017-03-27 DIAGNOSIS — Z01.818 PREOP GENERAL PHYSICAL EXAM: ICD-10-CM

## 2017-03-27 DIAGNOSIS — L05.01 PILONIDAL CYST WITH ABSCESS: Primary | ICD-10-CM

## 2017-03-27 PROCEDURE — 99214 OFFICE O/P EST MOD 30 MIN: CPT | Performed by: FAMILY MEDICINE

## 2017-03-27 ASSESSMENT — PAIN SCALES - GENERAL: PAINLEVEL: SEVERE PAIN (6)

## 2017-03-27 NOTE — NURSING NOTE
"Chief Complaint   Patient presents with     Pre-Op Exam       Initial /80 (BP Location: Left arm, Patient Position: Other (Comments), Cuff Size: Adult Large)  Pulse 93  Temp 98.3  F (36.8  C) (Tympanic)  Resp 16  Wt (!) 344 lb 4 oz (156.2 kg)  SpO2 100%  BMI 43.84 kg/m2 Estimated body mass index is 43.84 kg/(m^2) as calculated from the following:    Height as of 2/14/17: 6' 2.3\" (1.887 m).    Weight as of this encounter: 344 lb 4 oz (156.2 kg).  Medication Reconciliation: complete   Health Maintenance Due   Topic Date Due     LIPID SCREEN Q5 YR MALE (SYSTEM ASSIGNED)  08/06/2015     Tawnya Means, [facility]      "

## 2017-03-27 NOTE — PROGRESS NOTES
[address] [address]  [phone]  Dept: [phone]    PRE-OP EVALUATION:  Today's date: 3/27/2017    Sidney Plasencia (: 1980) presents for pre-operative evaluation assessment as requested by Dexter Almodovar MDHe requires evaluation and anesthesia risk assessment prior to undergoing surgery/procedure for treatment of CYSTECTOMY PILONIDAL .  Proposed procedure: excision of pilonidal cyst    Date of Surgery/ Procedure: 17  Time of Surgery/ Procedure: 1:30pm  Hospital/Surgical Facility: [facility]    Primary Physician: Abner Moreno  Type of Anesthesia Anticipated: General    Patient has a Health Care Directive or Living Will:  NO    1. NO - Do you have a history of heart attack, stroke, stent, bypass or surgery on an artery in the head, neck, heart or legs?  2. NO - Do you ever have any pain or discomfort in your chest?  3. NO - Do you have a history of  Heart Failure?  4. NO - Are you troubled by shortness of breath when: walking on the level, up a slight hill or at night?  5. NO - Do you currently have a cold, bronchitis or other respiratory infection?  6. NO - Do you have a cough, shortness of breath or wheezing?  7. NO - Do you sometimes get pains in the calves of your legs when you walk?  8. NO - Do you or anyone in your family have previous history of blood clots?  9. NO - Do you or does anyone in your family have a serious bleeding problem such as prolonged bleeding following surgeries or cuts?  10. NO - Have you ever had problems with anemia or been told to take iron pills?  11. NO - Have you had any abnormal blood loss such as black, tarry or bloody stools, or abnormal vaginal bleeding?  12. NO - Have you ever had a blood transfusion?  13. NO - Have you or any of your relatives ever had problems with anesthesia?  14. NO - Do you have sleep apnea, excessive snoring or daytime drowsiness?  15. NO - Do you have any  prosthetic heart valves?  16. NO - Do you have prosthetic joints?  17. NO - Is there any chance that you may be pregnant?      HPI:                                                      Brief HPI related to upcoming procedure: infected pilonidal cyst       See problem list for active medical problems.  Problems all longstanding and stable, except as noted/documented.  See ROS for pertinent symptoms related to these conditions.                                                                                                  .    MEDICAL HISTORY:                                                      Patient Active Problem List    Diagnosis Date Noted     Pilonidal cyst with abscess likely 03/16/2016     Priority: Medium     Back injury 02/10/2014     Priority: Medium      Past Medical History:   Diagnosis Date     NONSPECIFIC MEDICAL HISTORY 2/2003    lumabr spine film-degen. disc. disease L5-S1     Past Surgical History:   Procedure Laterality Date     C APPENDECTOMY  2000     HC REMOVE TONSILS/ADENOIDS,<11 Y/O  1990    T & A <12y.o.     INJECT EPIDURAL TRANSFORAMINAL  3/13/2014    Procedure: INJECT EPIDURAL TRANSFORAMINAL;  transforaminal epidural steroid injection right lumbar 4-5, lumbar 5-sacral1;  Surgeon: Emmett Galvez MD;  Location:  OR     Current Outpatient Prescriptions   Medication Sig Dispense Refill     oxyCODONE-acetaminophen (PERCOCET) 5-325 MG per tablet maximum 3 tablet(s) per day 25 tablet 0     methylPREDNISolone (MEDROL DOSEPAK) 4 MG tablet Follow package instructions 21 tablet 0     OTC products: None, except as noted above    Allergies   Allergen Reactions     No Known Drug Allergies       Latex Allergy: NO    Social History   Substance Use Topics     Smoking status: Current Every Day Smoker     Packs/day: 1.00     Years: 20.00     Types: Cigarettes     Smokeless tobacco: Never Used     Alcohol use No      Comment: rare     History   Drug Use No       REVIEW OF SYSTEMS:                                                     C: NEGATIVE for fever, chills, change in weight  E/M: NEGATIVE for ear, mouth and throat problems  RESP:Smokes 1 PPD   CV: NEGATIVE for chest pain, palpitations or peripheral edema    EXAM:                                                    There were no vitals taken for this visit.    GENERAL APPEARANCE: healthy, alert and no distress     EYES: EOMI,  PERRL     HENT: ear canals and TM's normal and nose and mouth without ulcers or lesions     NECK: no adenopathy, no asymmetry, masses, or scars and thyroid normal to palpation     RESP: lungs clear to auscultation - no rales, rhonchi or wheezes     CV: regular rates and rhythm, normal S1 S2, no S3 or S4 and no murmur, click or rub     ABDOMEN:  soft, nontender, no HSM or masses and bowel sounds normal     MS: extremities normal- no gross deformities noted, no evidence of inflammation in joints, FROM in all extremities.     SKIN: no suspicious lesions or rashes     NEURO: Normal strength and tone, sensory exam grossly normal, mentation intact and speech normal     PSYCH: mentation appears normal. and affect normal/bright     LYMPHATICS: No axillary, cervical, or supraclavicular nodes    DIAGNOSTICS:                                                    No labs or EKG required for low risk surgery (cataract, skin procedure, breast biopsy, etc)    Recent Labs   Lab Test  11/27/12 2039   HGB  15.0   PLT  159        IMPRESSION:                                                    Reason for surgery/procedure: Pilonidal Cyst   Diagnosis/reason for consult: Pre-op    The proposed surgical procedure is considered LOW risk.    REVISED CARDIAC RISK INDEX  The patient has the following serious cardiovascular risks for perioperative complications such as (MI, PE, VFib and 3  AV Block):  No serious cardiac risks  INTERPRETATION: 0 risks: Class I (very low risk - 0.4% complication rate)    The patient has the following additional risks for perioperative  complications:  No identified additional risks      ICD-10-CM    1. Preop general physical exam Z01.818        RECOMMENDATIONS:                                                          -    APPROVAL GIVEN to proceed with proposed procedure, without further diagnostic evaluation       Signed Electronically by: Abner Moreno MD    Copy of this evaluation report is provided to requesting physician.    [place] Preop Guidelines

## 2017-03-27 NOTE — MR AVS SNAPSHOT
After Visit Summary   3/27/2017    Sidney Plasencia    MRN: [id number]           Patient Information     Date Of Birth          1980        Visit Information        Provider Department      3/27/2017 6:30 PM Abner Moreno MD [facility]        Today's Diagnoses     Pilonidal cyst with abscess likely    -  1    Preop general physical exam          Care Instructions      Before Your Surgery      Call your surgeon if there is any change in your health. This includes signs of a cold or flu (such as a sore throat, runny nose, cough, rash or fever).    Do not smoke, drink alcohol or take over the counter medicine (unless your surgeon or primary care doctor tells you to) for the 24 hours before and after surgery.    If you take prescribed drugs: Follow your doctor s orders about which medicines to take and which to stop until after surgery.    Eating and drinking prior to surgery: follow the instructions from your surgeon    Take a shower or bath the night before surgery. Use the soap your surgeon gave you to gently clean your skin. If you do not have soap from your surgeon, use your regular soap. Do not shave or scrub the surgery site.  Wear clean pajamas and have clean sheets on your bed.         Follow-ups after your visit        Your next 10 appointments already scheduled     Mar 30, 2017  2:00 PM CDT   New Visit with Sailaja Mendez PA-C   [facility]    [address] [facility] [address] [phone]            Mar 31, 2017   Procedure with Dexter Mendez MD   [facility] ([facility])    [address] [place] [id number] [phone]           [address]: Exit at [address] on [address] side of [place]. Turn right on [address]. Turn left at stoplight on [address]. [facility] will be in view two blocks ahead            Apr 10, 2017  9:00 AM CDT  "  Return Visit with Dexter Mendez MD   [facility]    [address] [facility] [address] [phone]              Who to contact     If you have questions or need follow up information about today's clinic visit or your schedule please contact [facility] directly at [phone].  Normal or non-critical lab and imaging results will be communicated to you by MyChart, letter or phone within 4 business days after the clinic has received the results. If you do not hear from us within 7 days, please contact the clinic through [employer] or phone. If you have a critical or abnormal lab result, we will notify you by phone as soon as possible.  Submit refill requests through [employer] or call your pharmacy and they will forward the refill request to us. Please allow 3 business days for your refill to be completed.          Additional Information About Your Visit        MyChart Information     [employer] lets you send messages to your doctor, view your test results, renew your prescriptions, schedule appointments and more. To sign up, go to [email or web address] . Click on \"Log in\" on the left side of the screen, which will take you to the Welcome page. Then click on \"Sign up Now\" on the right side of the page.     You will be asked to enter the access code listed below, as well as some personal information. Please follow the directions to create your username and password.     Your access code is: [email or web address]  Expires: 2017  7:21 PM     Your access code will  in 90 days. If you need help or a new code, please call your [facility] clinic or [phone].        Care EveryWhere ID     This is your Care EveryWhere ID. This could be used by other organizations to access your [facility] medical records  [id number]        Your Vitals Were     Pulse Temperature Respirations Pulse Oximetry BMI (Body Mass Index)       93 98.3  F (36.8  C) (Tympanic) 16 " 100% 43.84 kg/m2        Blood Pressure from Last 3 Encounters:   03/27/17 138/80   03/06/17 136/78   02/16/17 150/86    Weight from Last 3 Encounters:   03/27/17 (!) 344 lb 4 oz (156.2 kg)   03/06/17 (!) 344 lb 4 oz (156.2 kg)   02/21/17 (!) 342 lb 14.4 oz (155.5 kg)              Today, you had the following     No orders found for display       Primary Care Provider Office Phone # Fax #    Abner Moreno [phone]       [facility] [address] [facility] [address]        Thank you!     Thank you for choosing [facility]  for your care. Our goal is always to provide you with excellent care. Hearing back from our patients is one way we can continue to improve our services. Please take a few minutes to complete the written survey that you may receive in the mail after your visit with us. Thank you!             Your Updated Medication List - Protect others around you: Learn how to safely use, store and throw away your medicines at www.disposemymeds.org.          This list is accurate as of: 3/27/17  7:21 PM.  Always use your most recent med list.                   Brand Name Dispense Instructions for use    oxyCODONE-acetaminophen 5-325 MG per tablet    PERCOCET    25 tablet    maximum 3 tablet(s) per day

## 2017-03-29 NOTE — H&P (VIEW-ONLY)
[address] [address]  [phone]  Dept: [phone]    PRE-OP EVALUATION:  Today's date: 3/27/2017    Sidney Plasencia (: 1980) presents for pre-operative evaluation assessment as requested by Dexter Almodovar MDHe requires evaluation and anesthesia risk assessment prior to undergoing surgery/procedure for treatment of CYSTECTOMY PILONIDAL .  Proposed procedure: excision of pilonidal cyst    Date of Surgery/ Procedure: 17  Time of Surgery/ Procedure: 1:30pm  Hospital/Surgical Facility: [facility]    Primary Physician: Abner Moreno  Type of Anesthesia Anticipated: General    Patient has a Health Care Directive or Living Will:  NO    1. NO - Do you have a history of heart attack, stroke, stent, bypass or surgery on an artery in the head, neck, heart or legs?  2. NO - Do you ever have any pain or discomfort in your chest?  3. NO - Do you have a history of  Heart Failure?  4. NO - Are you troubled by shortness of breath when: walking on the level, up a slight hill or at night?  5. NO - Do you currently have a cold, bronchitis or other respiratory infection?  6. NO - Do you have a cough, shortness of breath or wheezing?  7. NO - Do you sometimes get pains in the calves of your legs when you walk?  8. NO - Do you or anyone in your family have previous history of blood clots?  9. NO - Do you or does anyone in your family have a serious bleeding problem such as prolonged bleeding following surgeries or cuts?  10. NO - Have you ever had problems with anemia or been told to take iron pills?  11. NO - Have you had any abnormal blood loss such as black, tarry or bloody stools, or abnormal vaginal bleeding?  12. NO - Have you ever had a blood transfusion?  13. NO - Have you or any of your relatives ever had problems with anesthesia?  14. NO - Do you have sleep apnea, excessive snoring or daytime drowsiness?  15. NO - Do you have any  prosthetic heart valves?  16. NO - Do you have prosthetic joints?  17. NO - Is there any chance that you may be pregnant?      HPI:                                                      Brief HPI related to upcoming procedure: infected pilonidal cyst       See problem list for active medical problems.  Problems all longstanding and stable, except as noted/documented.  See ROS for pertinent symptoms related to these conditions.                                                                                                  .    MEDICAL HISTORY:                                                      Patient Active Problem List    Diagnosis Date Noted     Pilonidal cyst with abscess likely 03/16/2016     Priority: Medium     Back injury 02/10/2014     Priority: Medium      Past Medical History:   Diagnosis Date     NONSPECIFIC MEDICAL HISTORY 2/2003    lumabr spine film-degen. disc. disease L5-S1     Past Surgical History:   Procedure Laterality Date     C APPENDECTOMY  2000     HC REMOVE TONSILS/ADENOIDS,<11 Y/O  1990    T & A <12y.o.     INJECT EPIDURAL TRANSFORAMINAL  3/13/2014    Procedure: INJECT EPIDURAL TRANSFORAMINAL;  transforaminal epidural steroid injection right lumbar 4-5, lumbar 5-sacral1;  Surgeon: Emmett Galvez MD;  Location:  OR     Current Outpatient Prescriptions   Medication Sig Dispense Refill     oxyCODONE-acetaminophen (PERCOCET) 5-325 MG per tablet maximum 3 tablet(s) per day 25 tablet 0     methylPREDNISolone (MEDROL DOSEPAK) 4 MG tablet Follow package instructions 21 tablet 0     OTC products: None, except as noted above    Allergies   Allergen Reactions     No Known Drug Allergies       Latex Allergy: NO    Social History   Substance Use Topics     Smoking status: Current Every Day Smoker     Packs/day: 1.00     Years: 20.00     Types: Cigarettes     Smokeless tobacco: Never Used     Alcohol use No      Comment: rare     History   Drug Use No       REVIEW OF SYSTEMS:                                                     C: NEGATIVE for fever, chills, change in weight  E/M: NEGATIVE for ear, mouth and throat problems  RESP:Smokes 1 PPD   CV: NEGATIVE for chest pain, palpitations or peripheral edema    EXAM:                                                    There were no vitals taken for this visit.    GENERAL APPEARANCE: healthy, alert and no distress     EYES: EOMI,  PERRL     HENT: ear canals and TM's normal and nose and mouth without ulcers or lesions     NECK: no adenopathy, no asymmetry, masses, or scars and thyroid normal to palpation     RESP: lungs clear to auscultation - no rales, rhonchi or wheezes     CV: regular rates and rhythm, normal S1 S2, no S3 or S4 and no murmur, click or rub     ABDOMEN:  soft, nontender, no HSM or masses and bowel sounds normal     MS: extremities normal- no gross deformities noted, no evidence of inflammation in joints, FROM in all extremities.     SKIN: no suspicious lesions or rashes     NEURO: Normal strength and tone, sensory exam grossly normal, mentation intact and speech normal     PSYCH: mentation appears normal. and affect normal/bright     LYMPHATICS: No axillary, cervical, or supraclavicular nodes    DIAGNOSTICS:                                                    No labs or EKG required for low risk surgery (cataract, skin procedure, breast biopsy, etc)    Recent Labs   Lab Test  11/27/12 2039   HGB  15.0   PLT  159        IMPRESSION:                                                    Reason for surgery/procedure: Pilonidal Cyst   Diagnosis/reason for consult: Pre-op    The proposed surgical procedure is considered LOW risk.    REVISED CARDIAC RISK INDEX  The patient has the following serious cardiovascular risks for perioperative complications such as (MI, PE, VFib and 3  AV Block):  No serious cardiac risks  INTERPRETATION: 0 risks: Class I (very low risk - 0.4% complication rate)    The patient has the following additional risks for perioperative  complications:  No identified additional risks      ICD-10-CM    1. Preop general physical exam Z01.818        RECOMMENDATIONS:                                                          -    APPROVAL GIVEN to proceed with proposed procedure, without further diagnostic evaluation       Signed Electronically by: Abner Moreno MD    Copy of this evaluation report is provided to requesting physician.    [facility] Preop Guidelines

## 2017-03-30 ENCOUNTER — OFFICE VISIT (OUTPATIENT)
Dept: NEUROSURGERY | Facility: CLINIC | Age: 37
End: 2017-03-30
Payer: COMMERCIAL

## 2017-03-30 VITALS — BODY MASS INDEX: 40.43 KG/M2 | HEIGHT: 74 IN | TEMPERATURE: 97.8 F | WEIGHT: 315 LBS

## 2017-03-30 DIAGNOSIS — G89.29 CHRONIC RIGHT-SIDED LOW BACK PAIN WITH RIGHT-SIDED SCIATICA: Primary | ICD-10-CM

## 2017-03-30 DIAGNOSIS — M54.41 CHRONIC RIGHT-SIDED LOW BACK PAIN WITH RIGHT-SIDED SCIATICA: Primary | ICD-10-CM

## 2017-03-30 PROCEDURE — 99204 OFFICE O/P NEW MOD 45 MIN: CPT | Performed by: NEUROLOGICAL SURGERY

## 2017-03-30 NOTE — NURSING NOTE
"Sidney Plasencia is a 36 year old male who presents for:  Chief Complaint   Patient presents with     Consult     Neurologic Problem     low back pain        Initial Vitals:  Temp 97.8  F (36.6  C) (Skin)  Ht 1.887 m (6' 2.3\")  Wt (!) 156 kg (344 lb)  BMI 43.81 kg/m2 Estimated body mass index is 43.81 kg/(m^2) as calculated from the following:    Height as of this encounter: 1.887 m (6' 2.3\").    Weight as of this encounter: 156 kg (344 lb).. Body surface area is 2.86 meters squared. BP completed using cuff size: NA (Not Taken)  Data Unavailable    Do you feel safe in your environment?  Yes  Do you need any refills today? No    Nursing Comments:         Tez Jennings    "

## 2017-03-30 NOTE — LETTER
[facility]   [facility]  [address] [address]    March 30th, 2017    To Whom it May Concern,      Sidney Plasencia is being seen at our clinic for evaluation. In my medical opinion, this is a continuation of the work-related injury from 3 years ago and not a new process. Please call our clinic with questions or concerns.       Sincerely,            Brandin Das MD  [facility]  [address] [address]    Tel [phone]

## 2017-03-30 NOTE — MR AVS SNAPSHOT
After Visit Summary   3/30/2017    Sidney Plasencia    MRN: [id number]           Patient Information     Date Of Birth          1980        Visit Information        Provider Department      3/30/2017 1:00 PM Brandin Das MD [facility]        Today's Diagnoses     Low back pain    -  1      Care Instructions    [facility] for injection. They will call you to schedule.         Follow-ups after your visit        Additional Services     PAIN MANAGEMENT CENTER ([place]) REFERRAL       Your provider has referred you to the [facility].    Reason for Referral: Procedure or injection only - patient will be contacted within 24 hrs to schedule: Right L3-4 epidural steroid injection. As well as, comprehensive evaluation and management.     Please complete the following questions:    What is your diagnosis for the patient's pain? Low back pain    Do you have any specific questions for the pain specialist? No    Are there any red flags that may impact the assessment or management of the patient? None    **ANY DIAGNOSTIC TESTS THAT ARE NOT IN EPIC SHOULD BE SENT TO THE PAIN CENTER**    Please note the Pre-Op Pain Consult must be scheduled 2-3 weeks prior to the patient's surgery.  Patient's trying to schedule within 2 weeks of surgery may not be accommodated.     Pre-Op Pain Consults are only good for 30 days.    REGARDING OPIOID MEDICATIONS:  We will always address appropriateness of opioid pain medications, but we generally will not automatically take on a prescribing role. When we do take on prescribing of opioids for chronic pain, it is in collaboration with the referring physician for an intermediate period of time (months), with an expectation that the primary physician or provider will assume the prescribing role if medications are effective at stable doses with demonstrated compliance.  Therefore, please do not assume that your prescribing  responsibilities end on the day of pain clinic consultation.  Is this agreeable to you? YES    For any questions, contact the [facility] at [phone].    Please be aware that coverage of these services is subject to the terms and limitations of your health insurance plan.  Call member services at your health plan with any benefit or coverage questions.      Please bring the following with you to your appointment:    (1) Any X-Rays, CTs or MRIs which have been performed.  Contact the facility where they were done to arrange for  prior to your scheduled appointment.    (2) List of current medications   (3) This referral request   (4) Any documents/labs given to you for this referral                  Your next 10 appointments already scheduled     Mar 31, 2017   Procedure with Dexter Mendez MD   [facility] ([facility])    [address] [address] [phone]           From [address]: Exit at [address] on south side of [place]. Turn right on [address]. Turn left at stoplight on [address]. [facility] will be in view two blocks ahead            Apr 10, 2017  9:00 AM CDT   [facility]    [address] [id number] [phone]              Who to contact     If you have questions or need follow up information about today's clinic visit or your schedule please contact [facility] directly at [phone].  Normal or non-critical lab and imaging results will be communicated to you by MyChart, letter or phone within 4 business days after the clinic has received the results. If you do not hear from us within 7 days, please contact the clinic through MyChart or phone. If you have a critical or abnormal lab result, we will notify you by phone as soon as possible.  Submit refill requests  "through [facility] or call your pharmacy and they will forward the refill request to us. Please allow 3 business days for your refill to be completed.          Additional Information About Your Visit        Dtimehart Information     [facility] lets you send messages to your doctor, view your test results, renew your prescriptions, schedule appointments and more. To sign up, go to [email or web address] . Click on \"Log in\" on the left side of the screen, which will take you to the Welcome page. Then click on \"Sign up Now\" on the right side of the page.     You will be asked to enter the access code listed below, as well as some personal information. Please follow the directions to create your username and password.     Your access code is: [email or web address]  Expires: 2017  7:21 PM     Your access code will  in 90 days. If you need help or a new code, please call your [facility] clinic or [phone].        Care EveryWhere ID     This is your Care EveryWhere ID. This could be used by other organizations to access your [facility] medical records  [id number]        Your Vitals Were     Temperature Height BMI (Body Mass Index)             97.8  F (36.6  C) (Skin) 1.887 m (6' 2.3\") 43.81 kg/m2          Blood Pressure from Last 3 Encounters:   17 138/80   17 136/78   17 150/86    Weight from Last 3 Encounters:   17 (!) 156 kg (344 lb)   17 (!) 156.2 kg (344 lb 4 oz)   17 (!) 156.2 kg (344 lb 4 oz)              We Performed the Following     PAIN MANAGEMENT CENTER ([place]) REFERRAL        Primary Care Provider Office Phone # Fax #    Abner Moreno [phone]       [facility] [address] [facility] [address]        Thank you!     Thank you for choosing [facility]  for your care. Our goal is always to provide you with excellent care. Hearing back from our patients is one way we can continue to improve our services. Please take a few " minutes to complete the written survey that you may receive in the mail after your visit with us. Thank you!             Your Updated Medication List - Protect others around you: Learn how to safely use, store and throw away your medicines at www.disposemymeds.org.          This list is accurate as of: 3/30/17  1:42 PM.  Always use your most recent med list.                   Brand Name Dispense Instructions for use    oxyCODONE-acetaminophen 5-325 MG per tablet    PERCOCET    25 tablet    maximum 3 tablet(s) per day

## 2017-03-30 NOTE — PROGRESS NOTES
"36M w/ hx L4 S1 right-sided decompressions for herniated discs, presents with worsening back pain.  He had a work injury three years ago, and suffered a disc herniation at that time.  He underwent decompression with Dr. Greer.  He's had multiple short flares of back and leg pain over the years.  Since November, he said seven out of 10, aching right-sided lower back pain.  He feels intermittent mild radiation to the buttocks and lateral thigh, but his back pain is his predominant symptom.         Past Medical History:   Diagnosis Date     NONSPECIFIC MEDICAL HISTORY 2/2003    lumabr spine film-degen. disc. disease L5-S1     Past Surgical History:   Procedure Laterality Date     C APPENDECTOMY  2000     HC REMOVE TONSILS/ADENOIDS,<11 Y/O  1990    T & A <12y.o.     INJECT EPIDURAL TRANSFORAMINAL  3/13/2014    Procedure: INJECT EPIDURAL TRANSFORAMINAL;  transforaminal epidural steroid injection right lumbar 4-5, lumbar 5-sacral1;  Surgeon: Emmett Galvez MD;  Location:  OR     Social History     Social History     Marital status:      Spouse name: N/A     Number of children: N/A     Years of education: N/A     Occupational History     Not on file.     Social History Main Topics     Smoking status: Current Every Day Smoker     Packs/day: 1.00     Years: 20.00     Types: Cigarettes     Smokeless tobacco: Never Used     Alcohol use No      Comment: rare     Drug use: No     Sexual activity: Yes     Partners: Female     Other Topics Concern     Not on file     Social History Narrative     No family history on file.     ROS: 10 point ROS neg other than the symptoms noted above in the HPI.    Physical Exam  Temp 97.8  F (36.6  C) (Skin)  Ht 6' 2.3\" (1.887 m)  Wt (!) 344 lb (156 kg)  BMI 43.81 kg/m2  HEENT:  Normocephalic, atraumatic.  PERRLA.  EOM s intact.  Visual fields full to gross exam  Neck:  Supple, non-tender, without lymphadenopathy.  Heart:  No peripheral edema  Lungs:  No SOB  Abdomen:  Non-distended. "   Skin:  Warm and dry.  Extremities:  No edema, cyanosis or clubbing.    NEUROLOGICAL EXAMINATION:     Mental status:  Alert and Oriented x 3, speech is fluent.  Cranial nerves:  II-XII intact.   Motor:    Shoulder Abduction:  Right:  5/5   Left:  5/5  Biceps:                      Right:  5/5   Left:  5/5  Triceps:                     Right:  5/5   Left:  5/5  Wrist Extensors:       Right:  5/5   Left:  5/5  Wrist Flexors:           Right:  5/5   Left:  5/5  interosseus :            Right:  5/5   Left:  5/5   Hip Flexor:                Right: 5/5  Left:  5/5  Hip Adductor:             Right:  5/5  Left:  5/5  Hip Abductor:             Right:  5/5  Left:  5/5  Gastroc Soleus:        Right:  5/5  Left:  5/5  Tib/Ant:                      Right:  5/5  Left:  5/5  EHL:                     Right:  5/5  Left:  5/5  Sensation:  Intact  Reflexes:  Negative Babinski.  Negative Clonus.  Negative Hinojosa's.  Coordination:  Smooth finger to nose testing.   Negative pronator drift.  Smooth tandem walking.    A/P:  36M w/ hx L4 S1 right-sided decompressions for herniated discs, presents with worsening back pain    I had a lengthy discussion with the patient, reviewing the history, symptoms and imaging  I do feel that his current symptoms continue to be related to the original injury, and do not represent a new injury  We referred him for physical therapy  We also referred him for epidural steroid injection  We also referred him to pain management at [place], for discussion of possible facet rhizotomy

## 2017-03-31 ENCOUNTER — ANESTHESIA (OUTPATIENT)
Dept: SURGERY | Facility: CLINIC | Age: 37
End: 2017-03-31
Payer: COMMERCIAL

## 2017-03-31 ENCOUNTER — HOSPITAL ENCOUNTER (OUTPATIENT)
Facility: CLINIC | Age: 37
Discharge: HOME | End: 2017-03-31
Attending: SPECIALIST | Admitting: SPECIALIST
Payer: COMMERCIAL

## 2017-03-31 ENCOUNTER — SURGERY (OUTPATIENT)
Age: 37
End: 2017-03-31

## 2017-03-31 ENCOUNTER — ANESTHESIA EVENT (OUTPATIENT)
Dept: SURGERY | Facility: CLINIC | Age: 37
End: 2017-03-31
Payer: COMMERCIAL

## 2017-03-31 VITALS
WEIGHT: 315 LBS | DIASTOLIC BLOOD PRESSURE: 101 MMHG | RESPIRATION RATE: 14 BRPM | BODY MASS INDEX: 40.43 KG/M2 | SYSTOLIC BLOOD PRESSURE: 153 MMHG | OXYGEN SATURATION: 95 % | HEIGHT: 74 IN | TEMPERATURE: 97.7 F

## 2017-03-31 DIAGNOSIS — G89.18 POST-OP PAIN: Primary | ICD-10-CM

## 2017-03-31 PROCEDURE — 71000014 ZZH RECOVERY PHASE 1 LEVEL 2 FIRST HR: Performed by: SPECIALIST

## 2017-03-31 PROCEDURE — 25000128 H RX IP 250 OP 636: Performed by: SPECIALIST

## 2017-03-31 PROCEDURE — 25000125 ZZHC RX 250: Performed by: SPECIALIST

## 2017-03-31 PROCEDURE — 25000132 ZZH RX MED GY IP 250 OP 250 PS 637: Performed by: SPECIALIST

## 2017-03-31 PROCEDURE — 25000564 ZZH DESFLURANE, EA 15 MIN: Performed by: SPECIALIST

## 2017-03-31 PROCEDURE — 25800025 ZZH RX 258: Performed by: NURSE ANESTHETIST, CERTIFIED REGISTERED

## 2017-03-31 PROCEDURE — 37000008 ZZH ANESTHESIA TECHNICAL FEE, 1ST 30 MIN: Performed by: SPECIALIST

## 2017-03-31 PROCEDURE — 88304 TISSUE EXAM BY PATHOLOGIST: CPT | Mod: 26 | Performed by: SPECIALIST

## 2017-03-31 PROCEDURE — 25000125 ZZHC RX 250: Performed by: NURSE ANESTHETIST, CERTIFIED REGISTERED

## 2017-03-31 PROCEDURE — 37000009 ZZH ANESTHESIA TECHNICAL FEE, EACH ADDTL 15 MIN: Performed by: SPECIALIST

## 2017-03-31 PROCEDURE — 36000052 ZZH SURGERY LEVEL 2 EA 15 ADDTL MIN: Performed by: SPECIALIST

## 2017-03-31 PROCEDURE — 88304 TISSUE EXAM BY PATHOLOGIST: CPT | Performed by: SPECIALIST

## 2017-03-31 PROCEDURE — 25000128 H RX IP 250 OP 636: Performed by: NURSE ANESTHETIST, CERTIFIED REGISTERED

## 2017-03-31 PROCEDURE — 40000306 ZZH STATISTIC PRE PROC ASSESS II: Performed by: SPECIALIST

## 2017-03-31 PROCEDURE — 36000050 ZZH SURGERY LEVEL 2 1ST 30 MIN: Performed by: SPECIALIST

## 2017-03-31 PROCEDURE — 71000027 ZZH RECOVERY PHASE 2 EACH 15 MINS: Performed by: SPECIALIST

## 2017-03-31 PROCEDURE — 71000015 ZZH RECOVERY PHASE 1 LEVEL 2 EA ADDTL HR: Performed by: SPECIALIST

## 2017-03-31 PROCEDURE — 11771 EXC PILONIDAL CYST XTNSV: CPT | Performed by: SPECIALIST

## 2017-03-31 PROCEDURE — 27210794 ZZH OR GENERAL SUPPLY STERILE: Performed by: SPECIALIST

## 2017-03-31 RX ORDER — LIDOCAINE HYDROCHLORIDE 20 MG/ML
INJECTION, SOLUTION INFILTRATION; PERINEURAL PRN
Status: DISCONTINUED | OUTPATIENT
Start: 2017-03-31 | End: 2017-03-31

## 2017-03-31 RX ORDER — OXYCODONE AND ACETAMINOPHEN 5; 325 MG/1; MG/1
1-2 TABLET ORAL EVERY 4 HOURS PRN
Qty: 30 TABLET | Refills: 0 | Status: SHIPPED | OUTPATIENT
Start: 2017-03-31 | End: 2017-04-05

## 2017-03-31 RX ORDER — ONDANSETRON 4 MG/1
4 TABLET, ORALLY DISINTEGRATING ORAL EVERY 30 MIN PRN
Status: DISCONTINUED | OUTPATIENT
Start: 2017-03-31 | End: 2017-03-31 | Stop reason: HOSPADM

## 2017-03-31 RX ORDER — OXYCODONE AND ACETAMINOPHEN 5; 325 MG/1; MG/1
1-2 TABLET ORAL
Status: DISCONTINUED | OUTPATIENT
Start: 2017-03-31 | End: 2017-03-31 | Stop reason: HOSPADM

## 2017-03-31 RX ORDER — BUPIVACAINE HYDROCHLORIDE AND EPINEPHRINE 2.5; 5 MG/ML; UG/ML
INJECTION, SOLUTION INFILTRATION; PERINEURAL PRN
Status: DISCONTINUED | OUTPATIENT
Start: 2017-03-31 | End: 2017-03-31 | Stop reason: HOSPADM

## 2017-03-31 RX ORDER — MEPERIDINE HYDROCHLORIDE 50 MG/ML
12.5 INJECTION INTRAMUSCULAR; INTRAVENOUS; SUBCUTANEOUS
Status: DISCONTINUED | OUTPATIENT
Start: 2017-03-31 | End: 2017-03-31 | Stop reason: HOSPADM

## 2017-03-31 RX ORDER — METOCLOPRAMIDE 5 MG/1
10 TABLET ORAL EVERY 6 HOURS PRN
Status: DISCONTINUED | OUTPATIENT
Start: 2017-03-31 | End: 2017-03-31 | Stop reason: HOSPADM

## 2017-03-31 RX ORDER — NALOXONE HYDROCHLORIDE 0.4 MG/ML
.1-.4 INJECTION, SOLUTION INTRAMUSCULAR; INTRAVENOUS; SUBCUTANEOUS
Status: DISCONTINUED | OUTPATIENT
Start: 2017-03-31 | End: 2017-03-31 | Stop reason: HOSPADM

## 2017-03-31 RX ORDER — DEXAMETHASONE SODIUM PHOSPHATE 10 MG/ML
INJECTION, SOLUTION INTRAMUSCULAR; INTRAVENOUS PRN
Status: DISCONTINUED | OUTPATIENT
Start: 2017-03-31 | End: 2017-03-31

## 2017-03-31 RX ORDER — ONDANSETRON 2 MG/ML
INJECTION INTRAMUSCULAR; INTRAVENOUS PRN
Status: DISCONTINUED | OUTPATIENT
Start: 2017-03-31 | End: 2017-03-31

## 2017-03-31 RX ORDER — FENTANYL CITRATE 50 UG/ML
25-50 INJECTION, SOLUTION INTRAMUSCULAR; INTRAVENOUS
Status: DISCONTINUED | OUTPATIENT
Start: 2017-03-31 | End: 2017-03-31 | Stop reason: HOSPADM

## 2017-03-31 RX ORDER — SODIUM CHLORIDE, SODIUM LACTATE, POTASSIUM CHLORIDE, CALCIUM CHLORIDE 600; 310; 30; 20 MG/100ML; MG/100ML; MG/100ML; MG/100ML
INJECTION, SOLUTION INTRAVENOUS CONTINUOUS
Status: DISCONTINUED | OUTPATIENT
Start: 2017-03-31 | End: 2017-03-31 | Stop reason: HOSPADM

## 2017-03-31 RX ORDER — GLYCOPYRROLATE 0.2 MG/ML
INJECTION, SOLUTION INTRAMUSCULAR; INTRAVENOUS PRN
Status: DISCONTINUED | OUTPATIENT
Start: 2017-03-31 | End: 2017-03-31

## 2017-03-31 RX ORDER — METOCLOPRAMIDE HYDROCHLORIDE 5 MG/ML
10 INJECTION INTRAMUSCULAR; INTRAVENOUS EVERY 6 HOURS PRN
Status: DISCONTINUED | OUTPATIENT
Start: 2017-03-31 | End: 2017-03-31 | Stop reason: HOSPADM

## 2017-03-31 RX ORDER — OXYCODONE AND ACETAMINOPHEN 5; 325 MG/1; MG/1
1-2 TABLET ORAL EVERY 4 HOURS PRN
Status: DISCONTINUED | OUTPATIENT
Start: 2017-03-31 | End: 2017-03-31 | Stop reason: HOSPADM

## 2017-03-31 RX ORDER — FENTANYL CITRATE 50 UG/ML
INJECTION, SOLUTION INTRAMUSCULAR; INTRAVENOUS PRN
Status: DISCONTINUED | OUTPATIENT
Start: 2017-03-31 | End: 2017-03-31

## 2017-03-31 RX ORDER — LIDOCAINE 40 MG/G
CREAM TOPICAL
Status: DISCONTINUED | OUTPATIENT
Start: 2017-03-31 | End: 2017-03-31 | Stop reason: HOSPADM

## 2017-03-31 RX ORDER — ONDANSETRON 2 MG/ML
4 INJECTION INTRAMUSCULAR; INTRAVENOUS EVERY 30 MIN PRN
Status: DISCONTINUED | OUTPATIENT
Start: 2017-03-31 | End: 2017-03-31 | Stop reason: HOSPADM

## 2017-03-31 RX ORDER — CEFAZOLIN SODIUM 1 G/3ML
1 INJECTION, POWDER, FOR SOLUTION INTRAMUSCULAR; INTRAVENOUS SEE ADMIN INSTRUCTIONS
Status: DISCONTINUED | OUTPATIENT
Start: 2017-03-31 | End: 2017-03-31 | Stop reason: HOSPADM

## 2017-03-31 RX ORDER — CEFAZOLIN SODIUM 1 G/50ML
3 SOLUTION INTRAVENOUS
Status: COMPLETED | OUTPATIENT
Start: 2017-03-31 | End: 2017-03-31

## 2017-03-31 RX ORDER — NEOSTIGMINE METHYLSULFATE 1 MG/ML
VIAL (ML) INJECTION PRN
Status: DISCONTINUED | OUTPATIENT
Start: 2017-03-31 | End: 2017-03-31

## 2017-03-31 RX ORDER — KETOROLAC TROMETHAMINE 30 MG/ML
INJECTION, SOLUTION INTRAMUSCULAR; INTRAVENOUS PRN
Status: DISCONTINUED | OUTPATIENT
Start: 2017-03-31 | End: 2017-03-31

## 2017-03-31 RX ORDER — PROPOFOL 10 MG/ML
INJECTION, EMULSION INTRAVENOUS PRN
Status: DISCONTINUED | OUTPATIENT
Start: 2017-03-31 | End: 2017-03-31

## 2017-03-31 RX ORDER — DIMENHYDRINATE 50 MG/ML
25 INJECTION, SOLUTION INTRAMUSCULAR; INTRAVENOUS
Status: DISCONTINUED | OUTPATIENT
Start: 2017-03-31 | End: 2017-03-31 | Stop reason: HOSPADM

## 2017-03-31 RX ADMIN — KETOROLAC TROMETHAMINE 30 MG: 30 INJECTION, SOLUTION INTRAMUSCULAR at 14:29

## 2017-03-31 RX ADMIN — Medication 30 ML: at 14:23

## 2017-03-31 RX ADMIN — Medication 4 MCG: at 14:12

## 2017-03-31 RX ADMIN — FENTANYL CITRATE 50 MCG: 50 INJECTION, SOLUTION INTRAMUSCULAR; INTRAVENOUS at 13:36

## 2017-03-31 RX ADMIN — SODIUM CHLORIDE, POTASSIUM CHLORIDE, SODIUM LACTATE AND CALCIUM CHLORIDE: 600; 310; 30; 20 INJECTION, SOLUTION INTRAVENOUS at 13:20

## 2017-03-31 RX ADMIN — FENTANYL CITRATE 50 MCG: 50 INJECTION, SOLUTION INTRAMUSCULAR; INTRAVENOUS at 13:41

## 2017-03-31 RX ADMIN — FENTANYL CITRATE 50 MCG: 50 INJECTION, SOLUTION INTRAMUSCULAR; INTRAVENOUS at 13:38

## 2017-03-31 RX ADMIN — Medication 4 MCG: at 14:17

## 2017-03-31 RX ADMIN — DEXAMETHASONE SODIUM PHOSPHATE 10 MG: 10 INJECTION, SOLUTION INTRAMUSCULAR; INTRAVENOUS at 13:51

## 2017-03-31 RX ADMIN — PROPOFOL 300 MG: 10 INJECTION, EMULSION INTRAVENOUS at 13:42

## 2017-03-31 RX ADMIN — ONDANSETRON HYDROCHLORIDE 4 MG: 2 SOLUTION INTRAMUSCULAR; INTRAVENOUS at 15:41

## 2017-03-31 RX ADMIN — HYDROMORPHONE HYDROCHLORIDE 0.5 MG: 1 INJECTION, SOLUTION INTRAMUSCULAR; INTRAVENOUS; SUBCUTANEOUS at 15:29

## 2017-03-31 RX ADMIN — NEOSTIGMINE METHYLSULFATE 4 MG: 1 INJECTION INTRAMUSCULAR; INTRAVENOUS; SUBCUTANEOUS at 14:30

## 2017-03-31 RX ADMIN — ROCURONIUM BROMIDE 40 MG: 10 INJECTION INTRAVENOUS at 13:43

## 2017-03-31 RX ADMIN — FENTANYL CITRATE 50 MCG: 50 INJECTION, SOLUTION INTRAMUSCULAR; INTRAVENOUS at 13:51

## 2017-03-31 RX ADMIN — LIDOCAINE HYDROCHLORIDE 0.3 ML: 10 INJECTION, SOLUTION EPIDURAL; INFILTRATION; INTRACAUDAL; PERINEURAL at 13:20

## 2017-03-31 RX ADMIN — LIDOCAINE HYDROCHLORIDE 80 MG: 20 INJECTION, SOLUTION INFILTRATION; PERINEURAL at 13:42

## 2017-03-31 RX ADMIN — ONDANSETRON 4 MG: 2 INJECTION INTRAMUSCULAR; INTRAVENOUS at 14:21

## 2017-03-31 RX ADMIN — HYDROMORPHONE HYDROCHLORIDE 0.5 MG: 1 INJECTION, SOLUTION INTRAMUSCULAR; INTRAVENOUS; SUBCUTANEOUS at 14:58

## 2017-03-31 RX ADMIN — CEFAZOLIN SODIUM 3 G: 1 INJECTION, POWDER, FOR SOLUTION INTRAMUSCULAR; INTRAVENOUS at 13:45

## 2017-03-31 RX ADMIN — OXYCODONE HYDROCHLORIDE AND ACETAMINOPHEN 2 TABLET: 5; 325 TABLET ORAL at 16:42

## 2017-03-31 RX ADMIN — SODIUM CHLORIDE, POTASSIUM CHLORIDE, SODIUM LACTATE AND CALCIUM CHLORIDE: 600; 310; 30; 20 INJECTION, SOLUTION INTRAVENOUS at 13:56

## 2017-03-31 RX ADMIN — FENTANYL CITRATE 50 MCG: 50 INJECTION, SOLUTION INTRAMUSCULAR; INTRAVENOUS at 15:48

## 2017-03-31 RX ADMIN — GLYCOPYRROLATE 0.8 MG: 0.2 INJECTION, SOLUTION INTRAMUSCULAR; INTRAVENOUS at 14:30

## 2017-03-31 RX ADMIN — SODIUM CHLORIDE, POTASSIUM CHLORIDE, SODIUM LACTATE AND CALCIUM CHLORIDE: 600; 310; 30; 20 INJECTION, SOLUTION INTRAVENOUS at 14:33

## 2017-03-31 RX ADMIN — FENTANYL CITRATE 50 MCG: 50 INJECTION, SOLUTION INTRAMUSCULAR; INTRAVENOUS at 13:58

## 2017-03-31 RX ADMIN — GLYCOPYRROLATE 0.1 MG: 0.2 INJECTION, SOLUTION INTRAMUSCULAR; INTRAVENOUS at 13:50

## 2017-03-31 RX ADMIN — GLYCOPYRROLATE 0.1 MG: 0.2 INJECTION, SOLUTION INTRAMUSCULAR; INTRAVENOUS at 13:40

## 2017-03-31 RX ADMIN — MIDAZOLAM HYDROCHLORIDE 2 MG: 1 INJECTION, SOLUTION INTRAMUSCULAR; INTRAVENOUS at 13:34

## 2017-03-31 RX ADMIN — Medication 4 MCG: at 14:22

## 2017-03-31 RX ADMIN — HYDROMORPHONE HYDROCHLORIDE 0.5 MG: 1 INJECTION, SOLUTION INTRAMUSCULAR; INTRAVENOUS; SUBCUTANEOUS at 16:06

## 2017-03-31 RX ADMIN — FENTANYL CITRATE 50 MCG: 50 INJECTION, SOLUTION INTRAMUSCULAR; INTRAVENOUS at 15:37

## 2017-03-31 RX ADMIN — HYDROMORPHONE HYDROCHLORIDE 0.5 MG: 1 INJECTION, SOLUTION INTRAMUSCULAR; INTRAVENOUS; SUBCUTANEOUS at 16:14

## 2017-03-31 ASSESSMENT — LIFESTYLE VARIABLES: TOBACCO_USE: 1

## 2017-03-31 NOTE — OR NURSING
S:  36 year old F  to Recovery, post surgical for Cystectomy Excision of Pilonidal Cyst under general anesthesia  B:  see H & P  A:  Airway: patent, SPO2: 9, IV: L.R. infusing, Pain: yes, Dressing: clean, dry & intact  R:  Report received from Marie MCKEON RN and Romel CARRERA

## 2017-03-31 NOTE — IP AVS SNAPSHOT
[facility]    [address]     [place] [address]    Phone:  [phone]                                       After Visit Summary   3/31/2017    Sidney Plasencia    MRN: [id number]           After Visit Summary Signature Page     I have received my discharge instructions, and my questions have been answered. I have discussed any challenges I see with this plan with the nurse or doctor.    ..........................................................................................................................................  Patient/Patient Representative Signature      ..........................................................................................................................................  Patient Representative Print Name and Relationship to Patient    ..................................................               ................................................  Date                                            Time    ..........................................................................................................................................  Reviewed by Signature/Title    ...................................................              ..............................................  Date                                                            Time

## 2017-03-31 NOTE — DISCHARGE INSTRUCTIONS
[phone] Nurse Line (answered 24 hours a day)  [facility]  Same-Day Surgery  Adult Discharge Orders & Instructions    For 24 hours after surgery    1. Get plenty of rest.  A responsible adult must stay with you for at least 24 hours after you leave the hospital.   2. Do not drive or use heavy equipment.  If you have weakness or tingling, don't drive or use heavy equipment until this feeling goes away.  3. Do not drink alcohol.  4. Avoid strenuous or risky activities.  Ask for help when climbing stairs.   5. You may feel lightheaded.  IF so, sit for a few minutes before standing.  Have someone help you get up.   6. If you have nausea (feel sick to your stomach): Drink only clear liquids such as apple juice, ginger ale, broth or 7-Up.  Rest may also help.  Be sure to drink enough fluids.  Move to a regular diet as you feel able.  7. You may have a slight fever. Call the doctor if your fever is over 100 F (37.7 C) (taken under the tongue) or lasts longer than 24 hours.  8. You may have a dry mouth, a sore throat, muscle aches or trouble sleeping.  These should go away after 24 hours.  9. Do not make important or legal decisions.   Call your doctor for any of the followin.  Signs of infection (fever, growing tenderness at the surgery site, a large amount of drainage or bleeding, severe pain, foul-smelling drainage, redness, swelling).    2.  It has been over 8 to 10 hours since surgery and you are still not able to urinate (pass water).    3.  Headache for over 24 hours.      [phone] Nurse Line (answered 24 hours a day)    [phone] [facility]

## 2017-03-31 NOTE — ANESTHESIA CARE TRANSFER NOTE
Patient: Sidney Plasencia    Procedure(s):  excision of pilonidal cyst - Wound Class: II-Clean Contaminated    Diagnosis: pilonidal cyst  Diagnosis Additional Information: No value filed.    Anesthesia Type:   General, ETT     Note:  Airway :Face Mask  Patient transferred to:PACU        Vitals: (Last set prior to Anesthesia Care Transfer)    CRNA VITALS  3/31/2017 1422 - 3/31/2017 1502      3/31/2017             Pulse: 103    SpO2: 96 %                Electronically Signed By: RADHA Davies CRNA  March 31, 2017  3:02 PM

## 2017-03-31 NOTE — BRIEF OP NOTE
[facility] Brief Operative Note    Pre-operative diagnosis: pilonidal cyst   Post-operative diagnosis Same   Procedure: Procedure(s):  excision of pilonidal cyst - Wound Class: II-Clean Contaminated   Surgeon: Dexter Mendez MD, FACS   Assistants(s): None   Estimated blood loss: Less than 10 ml    Specimens: Pilonidal Cyst   Findings: Pilonidal cyst with scarring     Dexter Mendez MD, FACS    #[id number]

## 2017-03-31 NOTE — ANESTHESIA PREPROCEDURE EVALUATION
Anesthesia Evaluation     . Pt has had prior anesthetic. Type: General    No history of anesthetic complications          ROS/MED HX    ENT/Pulmonary:     (+)tobacco use, Current use 1 packs/day  , . .    Neurologic:  - neg neurologic ROS     Cardiovascular:  - neg cardiovascular ROS       METS/Exercise Tolerance:     Hematologic:  - neg hematologic  ROS       Musculoskeletal:   (+) , , other musculoskeletal- lumbar pain      GI/Hepatic:  - neg GI/hepatic ROS       Renal/Genitourinary:  - ROS Renal section negative       Endo:     (+) Obesity, .      Psychiatric:  - neg psychiatric ROS       Infectious Disease:  - neg infectious disease ROS       Malignancy:      - no malignancy   Other:    - neg other ROS                 Physical Exam  Normal systems: cardiovascular, pulmonary and dental    Airway   Mallampati: III  TM distance: >3 FB  Neck ROM: full    Dental     Cardiovascular   Rhythm and rate: regular and normal      Pulmonary    breath sounds clear to auscultation                    Anesthesia Plan      History & Physical Review  History and physical reviewed and following examination; no interval change.    ASA Status:  2 .        Plan for General and ETT with Intravenous and Propofol induction. Maintenance will be Balanced.    PONV prophylaxis:  Ondansetron (or other 5HT-3) and Dexamethasone or Solumedrol       Postoperative Care  Postoperative pain management:  IV analgesics and Oral pain medications.      Consents  Anesthetic plan, risks, benefits and alternatives discussed with:  Patient or representative and Patient..                            .    Anesthesia Plan      History & Physical Review  History and physical reviewed and following examination; no interval change.    ASA Status:  2 .        Plan for General and ETT with Intravenous and Propofol induction. Maintenance will be Balanced.    PONV prophylaxis:  Ondansetron (or other 5HT-3) and Dexamethasone or Solumedrol       Postoperative  Care  Postoperative pain management:  IV analgesics and Oral pain medications.      Consents  Anesthetic plan, risks, benefits and alternatives discussed with:  Patient or representative and Patient..

## 2017-03-31 NOTE — OP NOTE
DATE OF PROCEDURE:  3/31/2017      PREOPERATIVE DIAGNOSIS:  Pilonidal cyst, status post multiple incision and drainages.      POSTOPERATIVE DIAGNOSIS:  Pilonidal cyst, status post multiple incision and drainages.      PROCEDURE:  Excision of pilonidal cyst.      SURGEON:  Dexter Mooney M.D.      ANESTHESIA:  General by endotracheal tube.      INDICATIONS FOR PROCEDURE:  Mr. Sidney Steele is a 36-year-old gentleman who has had a previously infected pilonidal cyst and several incision and drainages.  Because of recurrent infection, it was elected to take him to the operating room for an excision.      OPERATIVE FINDINGS:  Included a pilonidal cyst with surrounding scarring.      DETAILS OF PROCEDURE:  The patient was taken to the operating room and had general anesthesia induced on the stretcher and he was then placed in the prone position on the table.  The buttock cheeks were taped and the sacral area was prepped and draped in sterile fashion.  A timeout was then performed confirming the identity of the patient as well as the procedure to be performed.       An elliptical incision was then made around encompassing all the scar tissue and crypts.  This dissection was then carried down to the presacral fascia using cautery.  We then undermined medially and laterally using cautery.  The tapes were removed.  The subcutaneous tissue was then reapproximated using 3-0 Vicryl.  The skin was closed using vertical mattress 3-0 nylon suture.  A sterile pressure dressing was applied.      The patient was then taken from the operating room to the recovery room in stable condition to be sent home.         DEXTER MOONEY MD             D: 2017 14:46   T: 2017 15:44   MT: [id number]      Name:     SIDNEY STEELE   MRN:      [id number]        Account:        [id number]   :      1980           Procedure Date: 2017      Document: [id number]

## 2017-03-31 NOTE — IP AVS SNAPSHOT
MRN:[id number]                      After Visit Summary   3/31/2017    Sidney Plasencia    MRN: [id number]           Thank you!     Thank you for choosing [facility] for your care. Our goal is always to provide you with excellent care. Hearing back from our patients is one way we can continue to improve our services. Please take a few minutes to complete the written survey that you may receive in the mail after you visit with us. Thank you!        Patient Information     Date Of Birth          1980        About your hospital stay     You were admitted on:  March 31, 2017 You last received care in the:  [facility]    You were discharged on:  March 31, 2017       Who to Call     For medical emergencies, please call 911.  For non-urgent questions about your medical care, please call your primary care provider or clinic, [phone]  For questions related to your surgery, please call your surgery clinic        Attending Provider     Provider Specialty    Dexter Mendez MD General Surgery       Primary Care Provider Office Phone # Fax #    Abner Moreno [phone]       [place] [address]        After Care Instructions     Diet Instructions       Resume pre-procedure diet            Discharge Instructions       Patient to follow up with appointment in 7-10 days.            Do not - immerse incision in water until sutures removed       Do not immerse incision in water until sutures removed            Dressing       Keep dressing clean and dry.  Dressing / incisional care as instructed by RN and or Surgeon            Ice to affected area       Ice to operative site PRN            No Alcohol       For 24 hours post procedure            No driving or operating machinery        until the day after procedure            No lifting        No lifting over 20 lbs and no strenuous physical activity for 2 weeks            Shower        No shower for 24 hours post procedure. May shower Postoperative Day (POD)  2                  Your next 10 appointments already scheduled     Apr 10, 2017  9:00 AM CDT   Return Visit with Dexter Mendez MD   [facility]    [address] [id number] [phone]              Further instructions from your care team         [phone] Nurse Line (answered 24 hours a day)  [facility]  Same-Day Surgery  Adult Discharge Orders & Instructions    For 24 hours after surgery    1. Get plenty of rest.  A responsible adult must stay with you for at least 24 hours after you leave the hospital.   2. Do not drive or use heavy equipment.  If you have weakness or tingling, don't drive or use heavy equipment until this feeling goes away.  3. Do not drink alcohol.  4. Avoid strenuous or risky activities.  Ask for help when climbing stairs.   5. You may feel lightheaded.  IF so, sit for a few minutes before standing.  Have someone help you get up.   6. If you have nausea (feel sick to your stomach): Drink only clear liquids such as apple juice, ginger ale, broth or 7-Up.  Rest may also help.  Be sure to drink enough fluids.  Move to a regular diet as you feel able.  7. You may have a slight fever. Call the doctor if your fever is over 100 F (37.7 C) (taken under the tongue) or lasts longer than 24 hours.  8. You may have a dry mouth, a sore throat, muscle aches or trouble sleeping.  These should go away after 24 hours.  9. Do not make important or legal decisions.   Call your doctor for any of the followin.  Signs of infection (fever, growing tenderness at the surgery site, a large amount of drainage or bleeding, severe pain, foul-smelling drainage, redness, swelling).    2.  It has been over 8 to 10 hours since surgery and you are still not able to urinate (pass water).    3.  Headache for over 24 hours.      [phone] Nurse  "Line (answered 24 hours a day)    [phone] [facility]          Pending Results     Date and Time Order Name Status Description    3/31/2017 1407 Surgical pathology exam In process             Admission Information     Date & Time Provider Department Dept. Phone    3/31/2017 Dexter Mendez MD [facility] Phase [phone]      Your Vitals Were     Blood Pressure Temperature Respirations Height Weight Pulse Oximetry    145/97 98.1  F (36.7  C) (Oral) 10 1.887 m (6' 2.29\") 156 kg (344 lb) 95%    BMI (Body Mass Index)                   43.82 kg/m2           MyChart Information     [employer] lets you send messages to your doctor, view your test results, renew your prescriptions, schedule appointments and more. To sign up, go to [email or web address] . Click on \"Log in\" on the left side of the screen, which will take you to the Welcome page. Then click on \"Sign up Now\" on the right side of the page.     You will be asked to enter the access code listed below, as well as some personal information. Please follow the directions to create your username and password.     Your access code is: [email or web address]  Expires: 2017  7:21 PM     Your access code will  in 90 days. If you need help or a new code, please call your [facility] clinic or [phone].        Care EveryWhere ID     This is your Care EveryWhere ID. This could be used by other organizations to access your [facility] medical records  [id number]           Review of your medicines      CONTINUE these medicines which may have CHANGED, or have new prescriptions. If we are uncertain of the size of tablets/capsules you have at home, strength may be listed as something that might have changed.        Dose / Directions    * oxyCODONE-acetaminophen 5-325 MG per tablet   Commonly known as:  PERCOCET   This may have changed:  Another medication with the same name was added. Make sure you understand how and when to take " each.   Used for:  Back injury, subsequent encounter        maximum 3 tablet(s) per day   Quantity:  25 tablet   Refills:  0       * oxyCODONE-acetaminophen 5-325 MG per tablet   Commonly known as:  PERCOCET   This may have changed:  You were already taking a medication with the same name, and this prescription was added. Make sure you understand how and when to take each.   Used for:  Post-op pain        Dose:  1-2 tablet   Take 1-2 tablets by mouth every 4 hours as needed for pain (moderate to severe)   Quantity:  30 tablet   Refills:  0       * Notice:  This list has 2 medication(s) that are the same as other medications prescribed for you. Read the directions carefully, and ask your doctor or other care provider to review them with you.         Where to get your medicines      Some of these will need a paper prescription and others can be bought over the counter. Ask your nurse if you have questions.     Bring a paper prescription for each of these medications     oxyCODONE-acetaminophen 5-325 MG per tablet                Protect others around you: Learn how to safely use, store and throw away your medicines at www.disposemymeds.org.             Medication List: This is a list of all your medications and when to take them. Check marks below indicate your daily home schedule. Keep this list as a reference.      Medications           Morning Afternoon Evening Bedtime As Needed    * oxyCODONE-acetaminophen 5-325 MG per tablet   Commonly known as:  PERCOCET   maximum 3 tablet(s) per day   Last time this was given:  2 tablets on 3/31/2017  4:42 PM                                * oxyCODONE-acetaminophen 5-325 MG per tablet   Commonly known as:  PERCOCET   Take 1-2 tablets by mouth every 4 hours as needed for pain (moderate to severe)   Last time this was given:  2 tablets on 3/31/2017  4:42 PM                                * Notice:  This list has 2 medication(s) that are the same as other medications prescribed for  you. Read the directions carefully, and ask your doctor or other care provider to review them with you.

## 2017-04-05 ENCOUNTER — TELEPHONE (OUTPATIENT)
Dept: PALLIATIVE MEDICINE | Facility: CLINIC | Age: 37
End: 2017-04-05

## 2017-04-05 DIAGNOSIS — G89.18 POST-OP PAIN: ICD-10-CM

## 2017-04-05 LAB — COPATH REPORT: NORMAL

## 2017-04-05 RX ORDER — OXYCODONE AND ACETAMINOPHEN 5; 325 MG/1; MG/1
1-2 TABLET ORAL EVERY 4 HOURS PRN
Qty: 15 TABLET | Refills: 0 | Status: SHIPPED | OUTPATIENT
Start: 2017-04-05 | End: 2017-04-19

## 2017-04-05 NOTE — TELEPHONE ENCOUNTER
Patient reports some bruising around coccyx area. Doesn't think it's a lot. Reports pain when sitting -expected. Cannot sit more than 10 minutes. Was taking 2 percocet q4h for 3 days post-op and icing 15 min every hour. Then cut back to BID x 1 day. Then cut back to 2 tabs before bed. Plans to do the same thing today. Worried about percocet running out.    Writer suggested patient increasing ice throughout the day. Talked with Dr. Mcdonough and she will sign script for Percocet. Will call patient back with medication info.    Shira Phillips RN  [facility]  [phone]  4/5/2017 11:46 AM

## 2017-04-05 NOTE — TELEPHONE ENCOUNTER
Reason for call:  Patient reporting a symptom    Symptom or request: Had surgery last Friday 03/31- patient is concerned about some pain and bruising that he's having and would like to discuss this with Dr. Mendez or his nurse.    Duration (how long have symptoms been present): since the surgery    Have you been treated for this before? Yes    Additional comments: none    Phone Number patient can be reached at:  Home number on file [phone] (home)    Best Time:  any    Can we leave a detailed message on this number:  YES    Call taken on 4/5/2017 at 9:35 AM by Shira Anderson

## 2017-04-05 NOTE — TELEPHONE ENCOUNTER
Injection and on going pain management referral in same order, patient only wants to schedule injection at this time.      Madeleine CRUZ    [facility]

## 2017-04-05 NOTE — TELEPHONE ENCOUNTER
Pre-screening Questions for Radiology Injections:    Injection to be done at which interventional clinic site? [place]    Procedure ordered by ZAYNAB    Procedure ordered? Lumbar Epidural Steroid Injection    What insurance would patient like us to bill for this procedure? BCBS      Worker's comp-Any injection DO NOT SCHEDULE and route to Madeleine Tolbert.      [employer] insurance - For SI joint injections, DO NOT SCHEDULE and route Madeleine Tolbert.      [employer]- MBB's must be scheduled at LEAST two weeks apart      [employer] - Any injection besides hip/shoulder/knee joint DO NOT SCHEDULE and route to Madeleine Tolbert. She will obtain PA and call pt back to schedule procedure or notify pt of denial.     Any chance of pregnancy? Not Applicable   If YES, do NOT schedule and route to RN pool    Is an  needed? No     Patient has a drive home? (mandatory) YES:     Is patient taking any blood thinners (plavix, coumadin, jantoven, warfarin, heparin, pradaxa or dabigatran )? No   If hold needed, do NOT schedule, route to RN pool     Is patient taking any aspirin products? No     If more than 325mg/day do NOT schedule; route to RN pool     For CERVICAL procedures, hold all aspirin products for 6 days.      Does the patient have a bleeding or clotting disorder? No     If yes, okay to schedule AND route to RN nurse pool    **For any patients with platelet count <100, must be forwarded to provider**    Is patient diabetic?  No  If YES, have them bring their glucometer.    Does patient have an active infection or treated for one within the past week? No     Is patient currently taking any antibiotics?  No     For patients on chronic, preventative, or prophylactic antibiotics, procedures may be scheduled.     For patients on antibiotics for active or recent infection:    Moe Butler [place], [place]-antibiotic course must have been completed for 4 days    Renetta Jacob-antibiotic course must have been  completed for 7 days    Is patient currently taking any steroid medications? (i.e. Prednisone, Medrol)  No     For patients on steroid medications:    Moe Butler [place], [place]-steroid course must have been completed for 4 days    Renetta Jacob-steroid course must have been completed for 7 days    Reviewed with patient:  If you are started on any steroids or antibiotics between now and your appointment, you must contact us because it may affect our ability to perform your procedure.  Yes    Is patient actively being treated for cancer or immunocompromised, including the spleen having been removed? No    If YES, do NOT schedule and route to RN pool     **For Dr. Olvera patients without spleens should have the chart sent to her**    Are you able to get on and off an exam table with minimal or no assistance? Yes  If NO, do NOT schedule and route to RN pool    Are you able to roll over and lay on your stomach with minimal or no assistance? Yes  If NO, do NOT schedule and route to RN pool     Any allergies to contrast dye, iodine, shellfish, or numbing and steroid medications? No  If YES, route to RN pool AND add allergy information to appointment notes    Allergies: No known drug allergies        Has the patient had a flu shot or any other vaccinations within 7 days before or after the procedure.  No       Does patient have an MRI/CT?  YES:   (SI joint, hip injections, lumbar sympathetic blocks, and stellate ganglion blocks do not require an MRI)    Was the MRI done w/in the last 3 years?  Yes    Was MRI done at [facility]? Yes      If not, where was it done? N/A       If MRI was not done at [facility], [facility] or [facility] do NOT schedule and route to nursing.  If pt has an imaging disc, the injection may be scheduled but pt has to bring disc to appt. If they show up w/out disc the injection cannot be done    Reminders (please tell patient if applicable):       Instructed pt to arrive 30 minutes  early for IV start if this is for a cervical procedure, ALL sympathetic (stellate ganglion, hypogastric, or lumbar sympathetic block) and all sedation procedures (RFA, spinal cord stimulation trials).  Not Applicable    -IVs are not routinely placed for Rosa and [place] cervical case       If NPO for sedation, informed patient that it is okay to take medications with sips of water (except if they are to hold blood thinners).  Not Applicable   *DO take blood pressure medication if it is prescribed*      If this is for a cervical TOM, informed patient that aspirin needs to be held for 6 days.   Not Applicable      For all patients not having spinal cord stimulator (SCS) trials or radiofrequency ablations (RFAs), informed patient:  IV sedation is not provided for this procedure.  If you feel that an oral anti-anxiety medication is needed, you can discuss this further with your referring provider or primary care provider.  The Pain Clinic provider will discuss specifics of what the procedure includes at your appointment.  Most procedures last 10-20 minutes.  We use numbing medications to help with any discomfort during the procedure.  Not Applicable      Do not schedule procedures requiring IV placement in the first appointment of the day or first appointment after lunch.       For patients 85 or older we recommend having an adult stay w/ them for the remainder of the day.       Does the patient have any questions?  Not Applicable  Madeleine Tomasz  [facility]

## 2017-04-05 NOTE — TELEPHONE ENCOUNTER
Writer spoke with Dr. Mcdonough who re-ordered 15 tabs of percocet (brought to [facility]). She suggested adding motrin to pain medication regimen and taking ES tylenol. Continue additional care plan. Talked with patient about caution with tylenol administration and not to take more than 4000 mg within 24 hours. Writer discussed this with the patient who understood.    Shira Phillips RN  [facility]  [phone]  4/5/2017 12:01 PM

## 2017-04-07 NOTE — ANESTHESIA POSTPROCEDURE EVALUATION
Patient: Sidney Plasencia    Procedure(s):  excision of pilonidal cyst - Wound Class: II-Clean Contaminated    Diagnosis:pilonidal cyst  Diagnosis Additional Information: No value filed.    Anesthesia Type:  General, ETT    Note:  Anesthesia Post Evaluation    Patient location during evaluation: Bedside  Patient participation: Able to fully participate in evaluation  Level of consciousness: awake and alert  Pain management: adequate  Airway patency: patent  Cardiovascular status: acceptable  Respiratory status: acceptable  Hydration status: acceptable  PONV: none     Anesthetic complications: None          Last vitals:  Vitals:    03/31/17 1616 03/31/17 1630 03/31/17 1642   BP: (!) 145/97 (!) 133/106 (!) 153/101   Resp: 10 14 14   Temp:      SpO2:            Electronically Signed By: RADHA Becerril CRNA  April 7, 2017  9:17 AM

## 2017-04-10 ENCOUNTER — OFFICE VISIT (OUTPATIENT)
Dept: SURGERY | Facility: CLINIC | Age: 37
End: 2017-04-10
Payer: COMMERCIAL

## 2017-04-10 VITALS — TEMPERATURE: 97 F | HEART RATE: 74 BPM | OXYGEN SATURATION: 100 % | WEIGHT: 315 LBS | BODY MASS INDEX: 43.78 KG/M2

## 2017-04-10 DIAGNOSIS — Z98.890 POST-OPERATIVE STATE: Primary | ICD-10-CM

## 2017-04-10 PROCEDURE — 99024 POSTOP FOLLOW-UP VISIT: CPT | Performed by: SPECIALIST

## 2017-04-10 NOTE — MR AVS SNAPSHOT
After Visit Summary   4/10/2017    Sidney Plasencia    MRN: [id number]           Patient Information     Date Of Birth          1980        Visit Information        Provider Department      4/10/2017 9:00 AM Dexter Mendez MD [facility]         Follow-ups after your visit        Your next 10 appointments already scheduled     Apr 24, 2017   Procedure with Dexter Coto MD   [facility] ([facility])    [address] [place] [address]   [phone]           From [address]: Exit at [address] on south side of [place]. Turn right on [address]. Turn left at stoplight on [address]. [facility] will be in view two blocks ahead            Apr 24, 2017 10:00 AM CDT   XR FLUORO NEEDLE PLACEMENT SPINE with PHCARM1   [facility] ([facility])    [address] [address]   [phone]           For nerve root injection, please send or bring copies of any MRIs or other scans you have had.  Bring a list of your current medicines to your exam. (Include vitamins, minerals and over-the-counter medicines.) Leave your valuables at home.  Plan to have someone drive you home afterward.  Stop taking the following medicines (but talk to your doctor first):   If you take blood thinners, you may need to stop taking them a few days before treatment. Talk to your doctor before stopping these medicines.Stop taking Coumadin (warfarin) 3 days before treatment. Restart the day after treatment.   If you take Plavix, Ticlid, Pletal or Persantine, please ask your doctor if you should stop these medicines. You may need extra tests on the morning of your scan. You may take your other medicines as normal.  Stop all food and drink (including water) 3 hours before your test or treatment.  Please tell the doctor:   If you are allergic to X-ray dye (contrast fluid).   If you may be  "pregnant.  Injections take about 30 to 45 minutes. Most people spend up to 2 hours in the clinic or hospital.  Please call the Imaging Department at your exam site with any questions              Future tests that were ordered for you today     Open Future Orders        Priority Expected Expires Ordered    XR Fluoro Needle Placement Spine (With Procedure) Routine 4/10/2017 4/10/2018 4/10/2017            Who to contact     If you have questions or need follow up information about today's clinic visit or your schedule please contact [facility] directly at [phone].  Normal or non-critical lab and imaging results will be communicated to you by [employer], letter or phone within 4 business days after the clinic has received the results. If you do not hear from us within 7 days, please contact the clinic through [employer] or phone. If you have a critical or abnormal lab result, we will notify you by phone as soon as possible.  Submit refill requests through [employer] or call your pharmacy and they will forward the refill request to us. Please allow 3 business days for your refill to be completed.          Additional Information About Your Visit        [employer] Information     [employer] lets you send messages to your doctor, view your test results, renew your prescriptions, schedule appointments and more. To sign up, go to [email or web address] . Click on \"Log in\" on the left side of the screen, which will take you to the Welcome page. Then click on \"Sign up Now\" on the right side of the page.     You will be asked to enter the access code listed below, as well as some personal information. Please follow the directions to create your username and password.     Your access code is: [email or web address]  Expires: 2017  7:21 PM     Your access code will  in 90 days. If you need help or a new code, please call your [facility] or [phone].        Care EveryWhere ID     This is your Care EveryWhere ID. " This could be used by other organizations to access your [facility] medical records  [id number]        Your Vitals Were     Pulse Temperature Pulse Oximetry BMI (Body Mass Index)          74 97  F (36.1  C) (Temporal) 100% 43.78 kg/m2         Blood Pressure from Last 3 Encounters:   03/31/17 (!) 153/101   03/27/17 138/80   03/06/17 136/78    Weight from Last 3 Encounters:   04/10/17 (!) 343 lb 11.2 oz (155.9 kg)   03/31/17 (!) 344 lb (156 kg)   03/30/17 (!) 344 lb (156 kg)              Today, you had the following     No orders found for display       Primary Care Provider Office Phone # Fax #    Abner Moreno [phone]       [facility] [address] [facility] [address]        Thank you!     Thank you for choosing [facility]  for your care. Our goal is always to provide you with excellent care. Hearing back from our patients is one way we can continue to improve our services. Please take a few minutes to complete the written survey that you may receive in the mail after your visit with us. Thank you!             Your Updated Medication List - Protect others around you: Learn how to safely use, store and throw away your medicines at www.disposemymeds.org.          This list is accurate as of: 4/10/17  9:04 AM.  Always use your most recent med list.                   Brand Name Dispense Instructions for use    * oxyCODONE-acetaminophen 5-325 MG per tablet    PERCOCET    25 tablet    maximum 3 tablet(s) per day       * oxyCODONE-acetaminophen 5-325 MG per tablet    PERCOCET    15 tablet    Take 1-2 tablets by mouth every 4 hours as needed for pain (moderate to severe)       * Notice:  This list has 2 medication(s) that are the same as other medications prescribed for you. Read the directions carefully, and ask your doctor or other care provider to review them with you.

## 2017-04-10 NOTE — NURSING NOTE
"Chief Complaint   Patient presents with     Surgical Followup     excision of pilonidal cyst  DOS 3-       Initial Pulse 74  Temp 97  F (36.1  C) (Temporal)  Wt (!) 343 lb 11.2 oz (155.9 kg)  SpO2 100%  BMI 43.78 kg/m2 Estimated body mass index is 43.78 kg/(m^2) as calculated from the following:    Height as of 3/31/17: 6' 2.29\" (1.887 m).    Weight as of this encounter: 343 lb 11.2 oz (155.9 kg).  Medication Reconciliation: complete    "

## 2017-04-17 ENCOUNTER — TELEPHONE (OUTPATIENT)
Dept: FAMILY MEDICINE | Facility: CLINIC | Age: 37
End: 2017-04-17
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
## 2017-04-19 DIAGNOSIS — G89.18 POST-OP PAIN: ICD-10-CM

## 2017-04-19 RX ORDER — OXYCODONE AND ACETAMINOPHEN 5; 325 MG/1; MG/1
1-2 TABLET ORAL EVERY 4 HOURS PRN
Qty: 25 TABLET | Refills: 0 | Status: SHIPPED | OUTPATIENT
Start: 2017-04-19 | End: 2017-05-04

## 2017-04-19 NOTE — NURSING NOTE
Walked rx for Percocet to [facility] pharmacy on 04/19/17.  Tawnya Means, [facility]

## 2017-04-24 ENCOUNTER — SURGERY (OUTPATIENT)
Age: 37
End: 2017-04-24

## 2017-04-24 ENCOUNTER — HOSPITAL ENCOUNTER (OUTPATIENT)
Facility: CLINIC | Age: 37
Discharge: HOME | End: 2017-04-24
Attending: ANESTHESIOLOGY | Admitting: ANESTHESIOLOGY
Payer: COMMERCIAL

## 2017-04-24 ENCOUNTER — HOSPITAL ENCOUNTER (OUTPATIENT)
Dept: GENERAL RADIOLOGY | Facility: CLINIC | Age: 37
Discharge: HOME | End: 2017-04-24
Attending: ANESTHESIOLOGY | Admitting: ANESTHESIOLOGY
Payer: COMMERCIAL

## 2017-04-24 VITALS
RESPIRATION RATE: 18 BRPM | SYSTOLIC BLOOD PRESSURE: 110 MMHG | DIASTOLIC BLOOD PRESSURE: 70 MMHG | TEMPERATURE: 98.1 F | OXYGEN SATURATION: 98 %

## 2017-04-24 DIAGNOSIS — M54.5 LOW BACK PAIN, UNSPECIFIED BACK PAIN LATERALITY, UNSPECIFIED CHRONICITY, WITH SCIATICA PRESENCE UNSPECIFIED: ICD-10-CM

## 2017-04-24 PROCEDURE — 25000125 ZZHC RX 250: Performed by: ANESTHESIOLOGY

## 2017-04-24 PROCEDURE — 64484 NJX AA&/STRD TFRM EPI L/S EA: CPT | Performed by: ANESTHESIOLOGY

## 2017-04-24 PROCEDURE — 25500064 ZZH RX 255 OP 636: Performed by: ANESTHESIOLOGY

## 2017-04-24 PROCEDURE — 64484 NJX AA&/STRD TFRM EPI L/S EA: CPT | Mod: RT | Performed by: ANESTHESIOLOGY

## 2017-04-24 PROCEDURE — 25000128 H RX IP 250 OP 636: Performed by: ANESTHESIOLOGY

## 2017-04-24 PROCEDURE — 64483 NJX AA&/STRD TFRM EPI L/S 1: CPT | Mod: RT | Performed by: ANESTHESIOLOGY

## 2017-04-24 PROCEDURE — S0020 INJECTION, BUPIVICAINE HYDRO: HCPCS | Performed by: ANESTHESIOLOGY

## 2017-04-24 PROCEDURE — 64483 NJX AA&/STRD TFRM EPI L/S 1: CPT | Performed by: ANESTHESIOLOGY

## 2017-04-24 RX ORDER — DEXAMETHASONE SODIUM PHOSPHATE 10 MG/ML
INJECTION, SOLUTION INTRAMUSCULAR; INTRAVENOUS PRN
Status: DISCONTINUED | OUTPATIENT
Start: 2017-04-24 | End: 2017-04-24 | Stop reason: HOSPADM

## 2017-04-24 RX ORDER — BUPIVACAINE HYDROCHLORIDE 2.5 MG/ML
INJECTION, SOLUTION EPIDURAL; INFILTRATION; INTRACAUDAL PRN
Status: DISCONTINUED | OUTPATIENT
Start: 2017-04-24 | End: 2017-04-24 | Stop reason: HOSPADM

## 2017-04-24 RX ORDER — METHYLPREDNISOLONE ACETATE 40 MG/ML
INJECTION, SUSPENSION INTRA-ARTICULAR; INTRALESIONAL; INTRAMUSCULAR; SOFT TISSUE PRN
Status: DISCONTINUED | OUTPATIENT
Start: 2017-04-24 | End: 2017-04-24 | Stop reason: HOSPADM

## 2017-04-24 RX ORDER — IOPAMIDOL 612 MG/ML
INJECTION, SOLUTION INTRATHECAL PRN
Status: DISCONTINUED | OUTPATIENT
Start: 2017-04-24 | End: 2017-04-24 | Stop reason: HOSPADM

## 2017-04-24 RX ADMIN — IOPAMIDOL 2 ML: 612 INJECTION, SOLUTION INTRATHECAL at 09:53

## 2017-04-24 RX ADMIN — METHYLPREDNISOLONE ACETATE 40 MG: 40 INJECTION, SUSPENSION INTRA-ARTICULAR; INTRALESIONAL; INTRAMUSCULAR; SOFT TISSUE at 09:53

## 2017-04-24 RX ADMIN — BUPIVACAINE HYDROCHLORIDE 1 ML: 2.5 INJECTION, SOLUTION EPIDURAL; INFILTRATION; INTRACAUDAL; PERINEURAL at 09:52

## 2017-04-24 RX ADMIN — DEXAMETHASONE SODIUM PHOSPHATE 10 MG: 10 INJECTION, SOLUTION INTRAMUSCULAR; INTRAVENOUS at 09:53

## 2017-04-24 RX ADMIN — LIDOCAINE HYDROCHLORIDE 2 ML: 10 INJECTION, SOLUTION EPIDURAL; INFILTRATION; INTRACAUDAL; PERINEURAL at 09:53

## 2017-04-24 NOTE — OP NOTE
Pre procedure Diagnosis: lumbar radiculopathy, lumbar disc herniations   Post procedure Diagnosis: Same  Procedure performed: lumbar transforaminal epidural steroid injection L4-5, L5-S1 right, fluoroscopically guided, contrast controlled  Anesthesia: none  Complications: none  Operators: Dexter Coto MD     Indications:   Sidney Plasencia is a 36 year old male was sent by Dr. Brandin Das for lumbar epidural steroid injection.  They have a history of chronic lower back pain with pain radiating down the right lower extremity after prior laminectomy and discectomy.  Exam shows lumbar tenderness and positive straight leg raise on the right and they have tried conservative treatment including physical therapy, medications, surgery, and previous injections.    MRI lumbar spine was done on 3/8/17 which showed   FINDINGS: Five lumbar vertebrae are assumed. 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 There are degenerative disc disease changes from L3 to S1. Changes of right laminectomy at L5-S1, L4-L5.      Findings by specific level:     There is minimal to mild multilevel facet degenerative change.     L1-L2: No disc herniation or stenosis.     L2-L3: No disc herniation or significant stenosis.     L3-L4: Disc bulging. Minimal central stenosis which appears  predominantly on a congenital basis. No significant foraminal  stenosis.     L4-L5: There is a mild/moderate central and right parasagittal disc extrusion with mild caudal extension of disc material. Mass effect on the right L5 nerve root. Minimal overall central stenosis. Mild-moderate to moderate left and very mild right foraminal stenosis.     L5-S1: Mild/moderate right parasagittal disc protrusion with mass effect on the right S1 nerve root. Disc bulging elsewhere. No significant thecal sac compression. Mild-moderate right and moderate to moderate-severe left foraminal stenosis.     Compared with the previous  exam, the L3-L4 central stenosis and disc protrusion are decreased, the L4-L5 disc herniation is mildly decreased and the L5-S1 disc protrusion is mildly decreased.        IMPRESSION:  1. Multilevel degenerative disc and facet disease.  2. L3-L4: Minimal central stenosis.  3. L4-L5: Mild-moderate right asymmetric disc extrusion with mass effect on the L5 nerve root. Minimal central stenosis.  4. L5-S1: Mild-moderate right disc protrusion with mass effect on the S1 nerve root. Moderate to moderate-severe left foraminal stenosis    Options/alternatives, benefits and risks were discussed with the patient including bleeding, infection, tissue trauma, numbness, weakness, paralysis, spinal cord injury, radiation exposure, headache and reaction to medications. Questions were answered to his satisfaction and he agrees to proceed. Voluntary informed consent was obtained and signed.     Vitals were reviewed: Yes  /70  Temp 98.1  F (36.7  C) (Oral)  Resp 18  SpO2 98%  Allergies were reviewed:  Yes   Medications were reviewed:  Yes   Pre-procedure pain score: 5/10    Procedure:  After getting informed consent, patient was brought into the procedure suite and was placed in a prone position on the procedure table.   A Pause for the Cause was performed.  Patient was prepped and draped in sterile fashion.     After identifying the right L4-5 and L5-S1 neuroforamen, the C-arm was rotated to a right lateral oblique angle.  A total of 2 ml of Lidocaine 1% was used to anesthetize the skin and the needle tracks at a skin entry sites coaxial with the fluoroscopy beam, and overriding the superior aspect of the neuroforamen at both levels.  25 gauge 5 inch spinal needles were advanced under intermittent fluoroscopy until they entered the foramen superiorly beneath the pedicles at both levels.    The needle positions were then inspected from anteroposterior and lateral views, and the needles adjusted appropriately.  A total of 1.5  ml of Isovue-300 was injected, confirming appropriate needle positions, with spread into the nerve root sheaths and the epidural space at both levels, with no intravascular uptake at either level. 13.5 ml was wasted    Then, each level was injected with 2.5 ml of a combination of Decadron 5 mg, Depomedrol 40 mg, 0.25% bupivacaine 1 ml diluted with 2.5 ml of normal saline for a total injected volume of 5 ml and the needles were flushed with lidocaine and removed.    During the procedure, there was not a paresthesia.   Hemostasis was achieved, the area was cleaned, and bandaids were placed when appropriate.  The patient tolerated the procedure well, and was taken to the recovery room.    Images were saved to PACS.    Post-procedure pain score: 2/10  Follow-up includes:   -f/u phone call in one week  -f/u with referring provider    Dexter Coto MD  [facility]

## 2017-04-24 NOTE — IP AVS SNAPSHOT
[facility]    [address] [place] [address]    Phone:  [phone]                                       After Visit Summary   4/24/2017    Sidney Plasencia    MRN: [id number]           After Visit Summary Signature Page     I have received my discharge instructions, and my questions have been answered. I have discussed any challenges I see with this plan with the nurse or doctor.    ..........................................................................................................................................  Patient/Patient Representative Signature      ..........................................................................................................................................  Patient Representative Print Name and Relationship to Patient    ..................................................               ................................................  Date                                            Time    ..........................................................................................................................................  Reviewed by Signature/Title    ...................................................              ..............................................  Date                                                            Time

## 2017-04-24 NOTE — IP AVS SNAPSHOT
MRN:[id number]                      After Visit Summary   4/24/2017    Sidney Plasencia    MRN: [id number]           Thank you!     Thank you for choosing [facility] for your care. Our goal is always to provide you with excellent care. Hearing back from our patients is one way we can continue to improve our services. Please take a few minutes to complete the written survey that you may receive in the mail after you visit with us. Thank you!        Patient Information     Date Of Birth          1980        About your hospital stay     You were admitted on:  April 24, 2017 You last received care in the:  [facility]    You were discharged on:  April 24, 2017       Who to Call     For medical emergencies, please call 911.  For non-urgent questions about your medical care, please call your primary care provider or clinic, [phone]  For questions related to your surgery, please call your surgery clinic        Attending Provider     Provider Specialty    Dexter Tomlinson MD ANESTHESIOLOGY-PAIN MEDICINE       Primary Care Provider Office Phone # Fax #    Abner Moreno [phone]       [place] [address] [address]        After Care Instructions     Discharge Instructions       [facility]   Procedure Discharge Instructions    Today you saw:    Dr. Dexter Olvera, Dr. Yenny Lloyd    You had an:  Epidural steroid injection   sacro-iliac joint injection   facet joint injection  hip injection  piriformis injection     Medications used:  Lidocaine   Bupivacaine   Dexamethasone Depo-medrol  Omnipaque  Ropivicaine   Kenalog   Omniscan   Normal saline        If you have received sedation before, during, or after your procedure, for the next 24 hours you shall NOT:   -Drive  -Operate machinery  -Drink alcohol  -Sign any  legal documents  Be cautious when walking. Numbness and/or weakness in the lower extremities may occur up to 6-8 hours after the procedure due to effect of the local anesthetic  Do not drive for 6 hours. The effect of the local anesthetic could slow your reflexes.   You may resume your regular activities after 24 hours  Avoid strenuous activity for the first 24 hours  You may shower, however avoid swimming, tub baths or hot tubs for 24 hours following your procedure  You may have a mild to moderate increase in pain for several days following the injection.  It may take up to 14 days for the steroid medication to start working although you may feel the effect as early as a few days after the procedure.     You may use ice packs for 10-15 minutes, 3 to 4 times a day at the injection site for comfort  Do not use heat to painful areas for 6 to 8 hours. This will give the local anesthetic time to wear off and prevent you from accidentally burning your skin.   You may use anti-inflammatory medications (such as Ibuprofen or Aleve or Advil) or Tylenol for pain control if necessary  If you were fasting, you may resume your normal diet and medications after the procedure  If you have diabetes, check your blood sugar more frequently than usual as your blood sugar may be higher than normal for 10-14 days following a steroid injection. Contact your doctor who manages your diabetes if your blood sugar is higher than usual  If you experience any of the following, call the pain center nursing line during work hours at [phone] or the after hours provider line at [phone]:  -Fever over 100 degree F  -Swelling, bleeding, redness, drainage, warmth at the injection site  -Progressive weakness or numbness in your legs or arms  -Loss of bowel or bladder function  -Unusual headache that is not relieved by Tylenol  -Unusual new onset of pain that is not improving    Phone #s:  Appointment line: [phone];  Nurse line:  "[phone]                  Pending Results     Date and Time Order Name Status Description    2017 0850 XR Fluoro Needle Placement Spine (With Procedure) In process             Admission Information     Date & Time Provider Department Dept. Phone    2017 Dexter Tomlinson MD [facility] [phone]      Your Vitals Were     Blood Pressure Temperature Respirations Pulse Oximetry          110/70 98.1  F (36.7  C) (Oral) 18 98%        MyChart Information     [employer] lets you send messages to your doctor, view your test results, renew your prescriptions, schedule appointments and more. To sign up, go to [email or web address] . Click on \"Log in\" on the left side of the screen, which will take you to the Welcome page. Then click on \"Sign up Now\" on the right side of the page.     You will be asked to enter the access code listed below, as well as some personal information. Please follow the directions to create your username and password.     Your access code is: [email or web address]  Expires: 2017  7:21 PM     Your access code will  in 90 days. If you need help or a new code, please call your [place] clinic or [phone].        Care EveryWhere ID     This is your Care EveryWhere ID. This could be used by other organizations to access your [place] medical records  [id number]           Review of your medicines      UNREVIEWED medicines. Ask your doctor about these medicines        Dose / Directions    * oxyCODONE-acetaminophen 5-325 MG per tablet   Commonly known as:  PERCOCET   Used for:  Back injury, subsequent encounter        maximum 3 tablet(s) per day   Quantity:  25 tablet   Refills:  0       * oxyCODONE-acetaminophen 5-325 MG per tablet   Commonly known as:  PERCOCET   Used for:  Post-op pain        Dose:  1-2 tablet   Take 1-2 tablets by mouth every 4 hours as needed for pain (moderate to severe)   Quantity:  25 tablet   Refills:  0       * Notice:  This " list has 2 medication(s) that are the same as other medications prescribed for you. Read the directions carefully, and ask your doctor or other care provider to review them with you.             Protect others around you: Learn how to safely use, store and throw away your medicines at www.disposemymeds.org.             Medication List: This is a list of all your medications and when to take them. Check marks below indicate your daily home schedule. Keep this list as a reference.      Medications           Morning Afternoon Evening Bedtime As Needed    * oxyCODONE-acetaminophen 5-325 MG per tablet   Commonly known as:  PERCOCET   maximum 3 tablet(s) per day                                * oxyCODONE-acetaminophen 5-325 MG per tablet   Commonly known as:  PERCOCET   Take 1-2 tablets by mouth every 4 hours as needed for pain (moderate to severe)                                * Notice:  This list has 2 medication(s) that are the same as other medications prescribed for you. Read the directions carefully, and ask your doctor or other care provider to review them with you.

## 2017-05-04 DIAGNOSIS — G89.18 POST-OP PAIN: ICD-10-CM

## 2017-05-04 RX ORDER — OXYCODONE AND ACETAMINOPHEN 5; 325 MG/1; MG/1
1-2 TABLET ORAL EVERY 4 HOURS PRN
Qty: 25 TABLET | Refills: 0 | Status: SHIPPED | OUTPATIENT
Start: 2017-05-04 | End: 2017-05-23

## 2017-05-04 NOTE — NURSING NOTE
Rx for Percocet walked to [facility] pharmacy on 05/04/17.  Tawnya Means, [facility]

## 2017-05-23 ENCOUNTER — DOCUMENTATION ONLY (OUTPATIENT)
Dept: FAMILY MEDICINE | Facility: CLINIC | Age: 37
End: 2017-05-23

## 2017-05-23 DIAGNOSIS — G89.18 POST-OP PAIN: ICD-10-CM

## 2017-05-23 DIAGNOSIS — S39.92XD BACK INJURY, SUBSEQUENT ENCOUNTER: Primary | ICD-10-CM

## 2017-05-23 RX ORDER — OXYCODONE AND ACETAMINOPHEN 5; 325 MG/1; MG/1
1 TABLET ORAL
Qty: 32 TABLET | Refills: 0 | Status: SHIPPED | OUTPATIENT
Start: 2017-05-23 | End: 2017-06-20

## 2017-06-20 ENCOUNTER — TELEPHONE (OUTPATIENT)
Dept: FAMILY MEDICINE | Facility: CLINIC | Age: 37
End: 2017-06-20

## 2017-06-20 DIAGNOSIS — G89.18 POST-OP PAIN: ICD-10-CM

## 2017-06-20 DIAGNOSIS — S39.92XD BACK INJURY, SUBSEQUENT ENCOUNTER: ICD-10-CM

## 2017-06-20 RX ORDER — OXYCODONE AND ACETAMINOPHEN 5; 325 MG/1; MG/1
1 TABLET ORAL
Qty: 32 TABLET | Refills: 0 | Status: SHIPPED | OUTPATIENT
Start: 2017-06-20 | End: 2017-06-28

## 2017-06-28 ENCOUNTER — OFFICE VISIT (OUTPATIENT)
Dept: FAMILY MEDICINE | Facility: CLINIC | Age: 37
End: 2017-06-28
Payer: COMMERCIAL

## 2017-06-28 VITALS
OXYGEN SATURATION: 99 % | HEIGHT: 74 IN | BODY MASS INDEX: 40.43 KG/M2 | DIASTOLIC BLOOD PRESSURE: 78 MMHG | WEIGHT: 315 LBS | SYSTOLIC BLOOD PRESSURE: 136 MMHG | RESPIRATION RATE: 18 BRPM | HEART RATE: 85 BPM | TEMPERATURE: 98.1 F

## 2017-06-28 DIAGNOSIS — M79.662 PAIN OF LEFT CALF: Primary | ICD-10-CM

## 2017-06-28 PROCEDURE — 99213 OFFICE O/P EST LOW 20 MIN: CPT | Performed by: FAMILY MEDICINE

## 2017-06-28 ASSESSMENT — PAIN SCALES - GENERAL: PAINLEVEL: MILD PAIN (2)

## 2017-06-28 NOTE — MR AVS SNAPSHOT
After Visit Summary   6/28/2017    Sidney Plasencia    MRN: [id number]           Patient Information     Date Of Birth          1980        Visit Information        Provider Department      6/28/2017 9:50 AM Abner Moreno MD [facility]        Today's Diagnoses     Pain of left calf    -  1      Care Instructions    MRI Thursday 06/29/17 Arrival of 3:45pm-  They recommend no snaps buttons metal          Follow-ups after your visit        Your next 10 appointments already scheduled     Jun 29, 2017  4:15 PM CDT   MR LOWER EXTREMITY NON JOINT LEFT W/O CONTRAST with PHMR1   [facility] ([facility])    [address] [id number] [phone]           Take your medicines as usual, unless your doctor tells you not to. Bring a list of your current medicines to your exam (including vitamins, minerals and over-the-counter drugs). Also bring the results of similar scans you may have had.  Please remove any body piercings and hair extensions before you arrive.  Follow your doctor s orders. If you do not, we may have to postpone your exam.  You will not have contrast for this exam. You do not need to do anything special to prepare.  The MRI machine uses a strong magnet. Please wear clothes without metal (snaps, zippers). A sweatsuit works well, or we may give you a hospital gown.   **IMPORTANT** THE INSTRUCTIONS BELOW ARE ONLY FOR THOSE PATIENTS WHO HAVE BEEN TOLD THEY WILL RECEIVE SEDATION OR GENERAL ANESTHESIA DURING THEIR MRI PROCEDURE:  IF YOU WILL RECEIVE SEDATION (take medicine to help you relax during your exam):   You must get the medicine from your doctor before you arrive. Bring the medicine to the exam. Do not take it at home.   Arrive one hour early. Bring someone who can take you home after the test. Your medicine will make you sleepy. After the exam, you may not drive, take a bus or take a taxi by yourself.   No eating 8 hours  "before your exam. You may have clear liquids up until 4 hours before your exam. (Clear liquids include water, clear tea, black coffee and fruit juice without pulp.)  IF YOU WILL RECEIVE ANESTHESIA (be asleep for your exam):   Arrive 1 1/2 hours early. Bring someone who can take you home after the test. You may not drive, take a bus or take a taxi by yourself.   No eating 8 hours before your exam. You may have clear liquids up until 4 hours before your exam. (Clear liquids include water, clear tea, black coffee and fruit juice without pulp.)   You will spend four to five hours in the recovery room.  Please call the Imaging Department at your exam site with any questions.              Future tests that were ordered for you today     Open Future Orders        Priority Expected Expires Ordered    MR Low Ext Non Joint Lt w/o Contrast Routine  6/28/2018 6/28/2017            Who to contact     If you have questions or need follow up information about today's clinic visit or your schedule please contact [facility] directly at [phone].  Normal or non-critical lab and imaging results will be communicated to you by MyChart, letter or phone within 4 business days after the clinic has received the results. If you do not hear from us within 7 days, please contact the clinic through [employer] or phone. If you have a critical or abnormal lab result, we will notify you by phone as soon as possible.  Submit refill requests through [employer] or call your pharmacy and they will forward the refill request to us. Please allow 3 business days for your refill to be completed.          Additional Information About Your Visit        [employer] Information     [employer] lets you send messages to your doctor, view your test results, renew your prescriptions, schedule appointments and more. To sign up, go to [email or web address] . Click on \"Log in\" on the left side of the screen, which will take you to the Welcome page. Then " "click on \"Sign up Now\" on the right side of the page.     You will be asked to enter the access code listed below, as well as some personal information. Please follow the directions to create your username and password.     Your access code is: [email or web address]  Expires: 2017 10:28 AM     Your access code will  in 90 days. If you need help or a new code, please call your [place] clinic or [phone].        Care EveryWhere ID     This is your Care EveryWhere ID. This could be used by other organizations to access your [place] medical records  [id number]        Your Vitals Were     Pulse Temperature Respirations Height Pulse Oximetry BMI (Body Mass Index)    85 98.1  F (36.7  C) (Tympanic) 18 6' 2.1\" (1.882 m) 99% 43.28 kg/m2       Blood Pressure from Last 3 Encounters:   17 136/78   17 110/70   17 (!) 153/101    Weight from Last 3 Encounters:   17 (!) 338 lb (153.3 kg)   04/10/17 (!) 343 lb 11.2 oz (155.9 kg)   17 (!) 344 lb (156 kg)               Primary Care Provider Office Phone # Fax #    Abner Moreno [phone]       [address] [place] [address]        Equal Access to Services     [facility]: Hadii aad ku hadasho Soomaali, waaxda luqadaha, qaybta kaalmada adeegyada, suzette gutierres. So [facility] [phone].    ATENCIÓN: Si habla español, tiene a de jesus disposición servicios gratuitos de asistencia lingüística. Llame al [phone].    We comply with applicable federal civil rights laws and [place] laws. We do not discriminate on the basis of race, color, national origin, age, disability sex, sexual orientation or gender identity.            Thank you!     Thank you for choosing [facility]  for your care. Our goal is always to provide you with excellent care. Hearing back from our patients is one way we can continue to improve our services. Please take a few minutes to " complete the written survey that you may receive in the mail after your visit with us. Thank you!             Your Updated Medication List - Protect others around you: Learn how to safely use, store and throw away your medicines at www.disposemymeds.org.          This list is accurate as of: 6/28/17 10:28 AM.  Always use your most recent med list.                   Brand Name Dispense Instructions for use Diagnosis    oxyCODONE-acetaminophen 5-325 MG per tablet    PERCOCET    25 tablet    maximum 3 tablet(s) per day    Back injury, subsequent encounter

## 2017-06-28 NOTE — NURSING NOTE
"Chief Complaint   Patient presents with     Musculoskeletal Problem     left calf x2m. He stepped up a curb wrong. Foot was 1/2 on and 1/2 off. Pain 2/3 sitting, 8-10/10 when walking       Initial /78 (BP Location: Left arm, Patient Position: Chair, Cuff Size: Adult Large)  Pulse 85  Temp 98.1  F (36.7  C) (Tympanic)  Resp 18  Ht 6' 2.1\" (1.882 m)  Wt (!) 338 lb (153.3 kg)  SpO2 99%  BMI 43.28 kg/m2 Estimated body mass index is 43.28 kg/(m^2) as calculated from the following:    Height as of this encounter: 6' 2.1\" (1.882 m).    Weight as of this encounter: 338 lb (153.3 kg).  Medication Reconciliation: complete   Health Maintenance Due   Topic Date Due     LIPID SCREEN Q5 YR MALE (SYSTEM ASSIGNED)  08/06/2015     Tawnya Means, [facility]      "

## 2017-06-29 ENCOUNTER — HOSPITAL ENCOUNTER (OUTPATIENT)
Dept: MRI IMAGING | Facility: CLINIC | Age: 37
Discharge: HOME | End: 2017-06-29
Attending: FAMILY MEDICINE | Admitting: FAMILY MEDICINE
Payer: COMMERCIAL

## 2017-06-29 DIAGNOSIS — M79.662 PAIN OF LEFT CALF: ICD-10-CM

## 2017-06-29 PROCEDURE — 73718 MRI LOWER EXTREMITY W/O DYE: CPT | Mod: LT

## 2017-06-30 NOTE — PROGRESS NOTES
No Dictation system:      Brief note  S: stepped of a curb felt a searing pain L calf now one month out still very painful   O: L calf tender to palp, extreme   No swelling   Neg Stone's     A: severe L calf pain     P: MRI no evidence of DVT   Will inform pt. of all test results and any care plan changes.     Abner Moreno MD

## 2017-08-11 ENCOUNTER — TRANSFERRED RECORDS (OUTPATIENT)
Dept: HEALTH INFORMATION MANAGEMENT | Facility: CLINIC | Age: 37
End: 2017-08-11

## 2017-08-29 ENCOUNTER — MEDICAL CORRESPONDENCE (OUTPATIENT)
Dept: HEALTH INFORMATION MANAGEMENT | Facility: CLINIC | Age: 37
End: 2017-08-29

## 2017-09-27 ENCOUNTER — TELEPHONE (OUTPATIENT)
Dept: SURGERY | Facility: CLINIC | Age: 37
End: 2017-09-27

## 2017-09-27 NOTE — TELEPHONE ENCOUNTER
Received order from [facility] to schedule patient for a discogram with Dr. Galvez.  Left message for patient to call to schedule.

## 2017-09-27 NOTE — TELEPHONE ENCOUNTER
Patient returned call and is scheduled on 10/26/17 at 930 with Dr. Galvez.  Patient instructed to make appt with PCP for a preop physical.

## 2017-10-02 ENCOUNTER — TELEPHONE (OUTPATIENT)
Dept: FAMILY MEDICINE | Facility: CLINIC | Age: 37
End: 2017-10-02

## 2017-10-03 NOTE — TELEPHONE ENCOUNTER
Per RF- schedule pre-op for him on 10/09/17 at 4:10.  Called pt and scheduled this.  Tawnya Means, [facility]

## 2017-10-23 ENCOUNTER — OFFICE VISIT (OUTPATIENT)
Dept: FAMILY MEDICINE | Facility: CLINIC | Age: 37
End: 2017-10-23
Payer: OTHER MISCELLANEOUS

## 2017-10-23 VITALS
WEIGHT: 315 LBS | DIASTOLIC BLOOD PRESSURE: 74 MMHG | RESPIRATION RATE: 14 BRPM | BODY MASS INDEX: 43.2 KG/M2 | OXYGEN SATURATION: 100 % | SYSTOLIC BLOOD PRESSURE: 140 MMHG | TEMPERATURE: 98.5 F | HEART RATE: 80 BPM

## 2017-10-23 DIAGNOSIS — Z01.818 PREOP GENERAL PHYSICAL EXAM: Primary | ICD-10-CM

## 2017-10-23 DIAGNOSIS — S39.92XD INJURY OF BACK, SUBSEQUENT ENCOUNTER: ICD-10-CM

## 2017-10-23 PROCEDURE — 99214 OFFICE O/P EST MOD 30 MIN: CPT | Performed by: FAMILY MEDICINE

## 2017-10-23 ASSESSMENT — PAIN SCALES - GENERAL: PAINLEVEL: MILD PAIN (3)

## 2017-10-23 NOTE — PROGRESS NOTES
[address] [address]  [phone]  Dept: [phone]    PRE-OP EVALUATION:  Today's date: 10/23/2017    Sidney Plasencia (: 1980) presents for pre-operative evaluation assessment as requested by Emmett Dickson MD.  He requires evaluation and anesthesia risk assessment prior to undergoing surgery/procedure for treatment of DISCOGRAPHY .  Proposed procedure: discogram lumbar 3, 4, 5    Date of Surgery/ Procedure: 10/26/17  Time of Surgery/ Procedure: 9:30 am  Hospital/Surgical Facility: [facility]    Primary Physician: Abner Moreno  Type of Anesthesia Anticipated: Local with MAC    Patient has a Health Care Directive or Living Will:  NO    1. NO - Do you have a history of heart attack, stroke, stent, bypass or surgery on an artery in the head, neck, heart or legs?  2. NO - Do you ever have any pain or discomfort in your chest?  3. NO - Do you have a history of  Heart Failure?  4. NO - Are you troubled by shortness of breath when: walking on the level, up a slight hill or at night?  5. NO - Do you currently have a cold, bronchitis or other respiratory infection?  6. NO - Do you have a cough, shortness of breath or wheezing?  7. NO - Do you sometimes get pains in the calves of your legs when you walk?  8. NO - Do you or anyone in your family have previous history of blood clots?  9. NO - Do you or does anyone in your family have a serious bleeding problem such as prolonged bleeding following surgeries or cuts?  10. NO - Have you ever had problems with anemia or been told to take iron pills?  11. NO - Have you had any abnormal blood loss such as black, tarry or bloody stools, or abnormal vaginal bleeding?  12. NO - Have you ever had a blood transfusion?  13. NO - Have you or any of your relatives ever had problems with anesthesia?  14. YES - DO YOU HAVE SLEEP APNEA, EXCESSIVE SNORING OR DAYTIME DROWSINESS? snores  15. NO - Do you have any  prosthetic heart valves?  16. NO - Do you have prosthetic joints?  17. NO - Is there any chance that you may be pregnant?        HPI:                                                      Brief HPI related to upcoming procedure: Herniated Lumbar discs       See problem list for active medical problems.  Problems all longstanding and stable, except as noted/documented.  See ROS for pertinent symptoms related to these conditions.                                                                                                  .    MEDICAL HISTORY:                                                    Patient Active Problem List    Diagnosis Date Noted     Pilonidal cyst with abscess likely 03/16/2016     Priority: Medium     Back injury 02/10/2014     Priority: Medium      Past Medical History:   Diagnosis Date     NONSPECIFIC MEDICAL HISTORY 2/2003    lumabr spine film-degen. disc. disease L5-S1     Past Surgical History:   Procedure Laterality Date     C APPENDECTOMY  2000     CYSTECTOMY PILONIDAL N/A 3/31/2017    Procedure: CYSTECTOMY PILONIDAL;  Surgeon: Dexter Mendez MD;  Location:  OR      REMOVE TONSILS/ADENOIDS,<13 Y/O  1990    T & A <12y.o.     INJECT EPIDURAL LUMBAR N/A 4/24/2017    Procedure: INJECT EPIDURAL LUMBAR;  lumbar transforaminal epidural steroid injection Lumbar 4-5 and lumbar 5-Sacral 1;  Surgeon: Dexter Tomlinson MD;  Location:  OR     INJECT EPIDURAL TRANSFORAMINAL  3/13/2014    Procedure: INJECT EPIDURAL TRANSFORAMINAL;  transforaminal epidural steroid injection right lumbar 4-5, lumbar 5-sacral1;  Surgeon: Emmett Galvez MD;  Location:  OR     Current Outpatient Prescriptions   Medication Sig Dispense Refill     oxyCODONE-acetaminophen (PERCOCET) 5-325 MG per tablet maximum 3 tablet(s) per day 25 tablet 0     OTC products: None, except as noted above    Allergies   Allergen Reactions     No Known Drug Allergies       Latex Allergy: NO    Social History   Substance Use Topics      Smoking status: Current Every Day Smoker     Packs/day: 1.00     Years: 20.00     Types: Cigarettes     Smokeless tobacco: Never Used     Alcohol use No      Comment: rare     History   Drug Use No       REVIEW OF SYSTEMS:                                                    C: NEGATIVE for fever, chills, change in weight  E/M: NEGATIVE for ear, mouth and throat problems  R: NEGATIVE for significant cough or SOB  CV: NEGATIVE for chest pain, palpitations or peripheral edema    EXAM:                                                    /74 (BP Location: Right arm, Patient Position: Chair, Cuff Size: Adult Large)  Pulse 80  Temp 98.5  F (36.9  C) (Tympanic)  Resp 14  Wt (!) 337 lb 6 oz (153 kg)  SpO2 100%  BMI 43.2 kg/m2  GENERAL APPEARANCE: healthy, alert and no distress  HENT: ear canals and TM's normal and nose and mouth without ulcers or lesions  RESP: lungs clear to auscultation - no rales, rhonchi or wheezes  CV: regular rate and rhythm, normal S1 S2, no S3 or S4 and no murmur, click or rub   ABDOMEN: soft, nontender, no HSM or masses and bowel sounds normal  NEURO: Normal strength and tone, sensory exam grossly normal, mentation intact and speech normal    DIAGNOSTICS:                                                    No labs or EKG required for low risk surgery (cataract, skin procedure, breast biopsy, etc)    Recent Labs   Lab Test  11/27/12 2039   HGB  15.0   PLT  159        IMPRESSION:                                                    Reason for surgery/procedure: Chronic Back Pain   Diagnosis/reason for consult: Pre-op     The proposed surgical procedure is considered LOW risk.    REVISED CARDIAC RISK INDEX  The patient has the following serious cardiovascular risks for perioperative complications such as (MI, PE, VFib and 3  AV Block):  No serious cardiac risks  INTERPRETATION: 0 risks: Class I (very low risk - 0.4% complication rate)    The patient has the following additional risks for  perioperative complications:  No identified additional risks      ICD-10-CM    1. Preop general physical exam Z01.818        RECOMMENDATIONS:                                                              APPROVAL GIVEN to proceed with proposed procedure, without further diagnostic evaluation       Signed Electronically by: Abner Moreno MD    Copy of this evaluation report is provided to requesting physician.    [facility] Preop Guidelines

## 2017-10-23 NOTE — PROGRESS NOTES
"[address] [address]  [phone]  Dept: [phone]    PRE-OP EVALUATION:  Today's date: 10/23/2017    Sidney Plasencia (: 1980) presents for pre-operative evaluation assessment as requested by Emmett Dickson MD.  He requires evaluation and anesthesia risk assessment prior to undergoing surgery/procedure for treatment of DISCOGRAPHY .  Proposed procedure: discogram lumbar 3, 4, 5    Date of Surgery/ Procedure: 10/26/17  Time of Surgery/ Procedure: 9:30 am  Hospital/Surgical Facility: [facility]    Primary Physician: Abner Moreno  Type of Anesthesia Anticipated: Local with MAC    Patient has a Health Care Directive or Living Will:  {YES/NO:805209::\"NO\"}    {PREOP QUESTIONNAIRE OPTIONS 2 (by MA):869993}    HPI:                                                      Brief HPI related to upcoming procedure: ***      {Ch. Problems:880514}    MEDICAL HISTORY:                                                      Patient Active Problem List    Diagnosis Date Noted     Pilonidal cyst with abscess likely 2016     Priority: Medium     Back injury 02/10/2014     Priority: Medium      Past Medical History:   Diagnosis Date     NONSPECIFIC MEDICAL HISTORY 2003    lumabr spine film-degen. disc. disease L5-S1     Past Surgical History:   Procedure Laterality Date     C APPENDECTOMY       CYSTECTOMY PILONIDAL N/A 3/31/2017    Procedure: CYSTECTOMY PILONIDAL;  Surgeon: Dexter Mendez MD;  Location: [facility] OR     HC REMOVE TONSILS/ADENOIDS,<13 Y/O      T & A <12y.o.     INJECT EPIDURAL LUMBAR N/A 2017    Procedure: INJECT EPIDURAL LUMBAR;  lumbar transforaminal epidural steroid injection Lumbar 4-5 and lumbar 5-Sacral 1;  Surgeon: Dexter Tomlinson MD;  Location: [facility]     INJECT EPIDURAL TRANSFORAMINAL  3/13/2014    Procedure: INJECT EPIDURAL TRANSFORAMINAL;  transforaminal epidural steroid injection right lumbar 4-5, lumbar " "5-sacral1;  Surgeon: Emmett Galvez MD;  Location: [facility] OR     Current Outpatient Prescriptions   Medication Sig Dispense Refill     oxyCODONE-acetaminophen (PERCOCET) 5-325 MG per tablet maximum 3 tablet(s) per day 25 tablet 0     OTC products: {OTC ANALGESICS:303317}    Allergies   Allergen Reactions     No Known Drug Allergies       Latex Allergy: {YES/NO WITH DEFAULT:543796::\"NO\"}    Social History   Substance Use Topics     Smoking status: Current Every Day Smoker     Packs/day: 1.00     Years: 20.00     Types: Cigarettes     Smokeless tobacco: Never Used     Alcohol use No      Comment: rare     History   Drug Use No       REVIEW OF SYSTEMS:                                                    {ROS Preop Choices:990006}    EXAM:                                                    There were no vitals taken for this visit.  {EXAM Preop Choices:159145}    DIAGNOSTICS:                                                    {DIAGNOSTIC FOR PREOP:434582}    Recent Labs   Lab Test  11/27/12 2039   HGB  15.0   PLT  159        IMPRESSION:                                                    {PREOP REASONS:378648::\"Reason for surgery/procedure: ***\",\"Diagnosis/reason for consult: ***\"}    The proposed surgical procedure is considered {HIGH=major cardiovascular or procedures requiring prolonged anesthesia >4 hours or large fluid shifts;    INTERMEDIATE=abdominal, most orthopedic and intrathoracic surgery; LOW= endoscopy, cataract and breast surgery:088873} risk.    REVISED CARDIAC RISK INDEX  The patient has the following serious cardiovascular risks for perioperative complications such as (MI, PE, VFib and 3  AV Block):  {PREOP REVISED CARDIAC INDEX (RCI):554790:p:\"No serious cardiac risks\"}  INTERPRETATION: {REVISED CARDIAC RISK INTERPRETATION:174545}    The patient has the following additional risks for perioperative complications:  {Additional perioperative risks:928888:p:\"No identified additional risks\"}    No diagnosis " "found.    RECOMMENDATIONS:                                                      {IMPORTANT - Conditions - complete carefully!!:490617}    {IMPORTANT - Medications:550619::\"--Patient is to take all scheduled medications on the day of surgery EXCEPT for modifications listed below.\"}    {IMPORTANT - Approval:222884:p:\"APPROVAL GIVEN to proceed with proposed procedure, without further diagnostic evaluation\"}       Signed Electronically by: Abner Moreno MD, MD    Copy of this evaluation report is provided to requesting physician.    [facility] Preop Guidelines  "

## 2017-10-23 NOTE — MR AVS SNAPSHOT
After Visit Summary   10/23/2017    Sidney Plasencia    MRN: [id number]           Patient Information     Date Of Birth          1980        Visit Information        Provider Department      10/23/2017 5:10 PM Abner Moreno MD [facility]        Today's Diagnoses     Preop general physical exam    -  1    Injury of back, subsequent encounter          Care Instructions      Before Your Surgery      Call your surgeon if there is any change in your health. This includes signs of a cold or flu (such as a sore throat, runny nose, cough, rash or fever).    Do not smoke, drink alcohol or take over the counter medicine (unless your surgeon or primary care doctor tells you to) for the 24 hours before and after surgery.    If you take prescribed drugs: Follow your doctor s orders about which medicines to take and which to stop until after surgery.    Eating and drinking prior to surgery: follow the instructions from your surgeon    Take a shower or bath the night before surgery. Use the soap your surgeon gave you to gently clean your skin. If you do not have soap from your surgeon, use your regular soap. Do not shave or scrub the surgery site.  Wear clean pajamas and have clean sheets on your bed.   Before Your Surgery    Call your surgeon if there is any change in your health. This includes signs of a cold or flu (such as a sore throat, runny nose, cough, rash or fever).  Do not smoke, drink alcohol or take over the counter medicine (unless your surgeon or primary care doctor tells you to) for the 24 hours before and after surgery.  If you take prescribed drugs: Follow your doctor s orders about which medicines to take and which to stop until after surgery.  Eating and drinking prior to surgery: follow the instructions from your surgeon  Take a shower or bath the night before surgery. Use the soap your surgeon gave you to gently clean your skin. If you do not have soap from your surgeon,  use your regular soap. Do not shave or scrub the surgery site.  Wear clean pajamas and have clean sheets on your bed.           Follow-ups after your visit        Your next 10 appointments already scheduled     Oct 26, 2017   Procedure with Emmett Galvez MD   [facility] Periop Services ([facility])    [address] [place] [id number] [phone]           From [address]: Exit at [address] on [place] side of [place]. Turn right on [address]. Turn left at stoplight on [address]. [facility] will be in view two blocks ahead            Oct 26, 2017  9:30 AM CDT   XR SURGERY DENIA LESS THAN 5 MIN FLUORO W STILLS with [email or web address]   [facility] ([facility])    [address]  [place] [id number] [phone]           Please bring a list of your current medicines to your exam. (Include vitamins, minerals and over-thecounter medicines.) Leave your valuables at home.  Tell your doctor if there is a chance you may be pregnant.  You do not need to do anything special for this exam.              Who to contact     If you have questions or need follow up information about today's clinic visit or your schedule please contact [facility] directly at [phone].  Normal or non-critical lab and imaging results will be communicated to you by ZAPhart, letter or phone within 4 business days after the clinic has received the results. If you do not hear from us within 7 days, please contact the clinic through ZAPhart or phone. If you have a critical or abnormal lab result, we will notify you by phone as soon as possible.  Submit refill requests through Editorially or call your pharmacy and they will forward the refill request to us. Please allow 3 business days for your refill to be completed.          Additional Information About Your Visit        Editorially Information     Editorially lets you send messages to your doctor, view your  "test results, renew your prescriptions, schedule appointments and more. To sign up, go to [email or web address] . Click on \"Log in\" on the left side of the screen, which will take you to the Welcome page. Then click on \"Sign up Now\" on the right side of the page.     You will be asked to enter the access code listed below, as well as some personal information. Please follow the directions to create your username and password.     Your access code is: [email or web address]  Expires: 2018  5:34 PM     Your access code will  in 90 days. If you need help or a new code, please call your [facility] clinic or [phone].        Care EveryWhere ID     This is your Care EveryWhere ID. This could be used by other organizations to access your [place] medical records  [id number]        Your Vitals Were     Pulse Temperature Respirations Pulse Oximetry BMI (Body Mass Index)       80 98.5  F (36.9  C) (Tympanic) 14 100% 43.2 kg/m2        Blood Pressure from Last 3 Encounters:   10/23/17 140/74   17 136/78   17 110/70    Weight from Last 3 Encounters:   10/23/17 (!) 337 lb 6 oz (153 kg)   17 (!) 338 lb (153.3 kg)   04/10/17 (!) 343 lb 11.2 oz (155.9 kg)              Today, you had the following     No orders found for display       Primary Care Provider Office Phone # Fax #    Abner Moreno [phone] [address] [place] [address]        Equal Access to Services     [facility]: Hadii fernanda Guajardo, hi jolley, suzette kincaid. So [facility] [phone].    ATENCIÓN: Si habla español, tiene a de jesus disposición servicios gratuitos de asistencia lingüística. Gordon al [phone].    We comply with applicable federal civil rights laws and [place] laws. We do not discriminate on the basis of race, color, national origin, age, disability, sex, sexual orientation, or gender identity.            Thank you!     Thank " you for choosing [facility]  for your care. Our goal is always to provide you with excellent care. Hearing back from our patients is one way we can continue to improve our services. Please take a few minutes to complete the written survey that you may receive in the mail after your visit with us. Thank you!             Your Updated Medication List - Protect others around you: Learn how to safely use, store and throw away your medicines at www.disposemymeds.org.          This list is accurate as of: 10/23/17  7:26 PM.  Always use your most recent med list.                   Brand Name Dispense Instructions for use Diagnosis    oxyCODONE-acetaminophen 5-325 MG per tablet    PERCOCET    25 tablet    maximum 3 tablet(s) per day    Back injury, subsequent encounter

## 2017-10-25 RX ORDER — CEFAZOLIN SODIUM 1 G/3ML
1 INJECTION, POWDER, FOR SOLUTION INTRAMUSCULAR; INTRAVENOUS
Status: CANCELLED | OUTPATIENT
Start: 2017-10-25

## 2017-10-25 NOTE — H&P (VIEW-ONLY)
[address] [address]  [phone]  Dept: [phone]    PRE-OP EVALUATION:  Today's date: 10/23/2017    Sidney Plasencia (: 1980) presents for pre-operative evaluation assessment as requested by Emmett Dickson MD.  He requires evaluation and anesthesia risk assessment prior to undergoing surgery/procedure for treatment of DISCOGRAPHY .  Proposed procedure: discogram lumbar 3, 4, 5    Date of Surgery/ Procedure: 10/26/17  Time of Surgery/ Procedure: 9:30 am  Hospital/Surgical Facility: [facility]    Primary Physician: Abner Moreno  Type of Anesthesia Anticipated: Local with MAC    Patient has a Health Care Directive or Living Will:  NO    1. NO - Do you have a history of heart attack, stroke, stent, bypass or surgery on an artery in the head, neck, heart or legs?  2. NO - Do you ever have any pain or discomfort in your chest?  3. NO - Do you have a history of  Heart Failure?  4. NO - Are you troubled by shortness of breath when: walking on the level, up a slight hill or at night?  5. NO - Do you currently have a cold, bronchitis or other respiratory infection?  6. NO - Do you have a cough, shortness of breath or wheezing?  7. NO - Do you sometimes get pains in the calves of your legs when you walk?  8. NO - Do you or anyone in your family have previous history of blood clots?  9. NO - Do you or does anyone in your family have a serious bleeding problem such as prolonged bleeding following surgeries or cuts?  10. NO - Have you ever had problems with anemia or been told to take iron pills?  11. NO - Have you had any abnormal blood loss such as black, tarry or bloody stools, or abnormal vaginal bleeding?  12. NO - Have you ever had a blood transfusion?  13. NO - Have you or any of your relatives ever had problems with anesthesia?  14. YES - DO YOU HAVE SLEEP APNEA, EXCESSIVE SNORING OR DAYTIME DROWSINESS? snores  15. NO - Do you have any  prosthetic heart valves?  16. NO - Do you have prosthetic joints?  17. NO - Is there any chance that you may be pregnant?        HPI:                                                      Brief HPI related to upcoming procedure: Herniated Lumbar discs       See problem list for active medical problems.  Problems all longstanding and stable, except as noted/documented.  See ROS for pertinent symptoms related to these conditions.                                                                                                  .    MEDICAL HISTORY:                                                    Patient Active Problem List    Diagnosis Date Noted     Pilonidal cyst with abscess likely 03/16/2016     Priority: Medium     Back injury 02/10/2014     Priority: Medium      Past Medical History:   Diagnosis Date     NONSPECIFIC MEDICAL HISTORY 2/2003    lumabr spine film-degen. disc. disease L5-S1     Past Surgical History:   Procedure Laterality Date     C APPENDECTOMY  2000     CYSTECTOMY PILONIDAL N/A 3/31/2017    Procedure: CYSTECTOMY PILONIDAL;  Surgeon: Dexter Mendez MD;  Location:  OR      REMOVE TONSILS/ADENOIDS,<13 Y/O  1990    T & A <12y.o.     INJECT EPIDURAL LUMBAR N/A 4/24/2017    Procedure: INJECT EPIDURAL LUMBAR;  lumbar transforaminal epidural steroid injection Lumbar 4-5 and lumbar 5-Sacral 1;  Surgeon: Dexter Tomlinson MD;  Location:  OR     INJECT EPIDURAL TRANSFORAMINAL  3/13/2014    Procedure: INJECT EPIDURAL TRANSFORAMINAL;  transforaminal epidural steroid injection right lumbar 4-5, lumbar 5-sacral1;  Surgeon: Emmett Galvez MD;  Location:  OR     Current Outpatient Prescriptions   Medication Sig Dispense Refill     oxyCODONE-acetaminophen (PERCOCET) 5-325 MG per tablet maximum 3 tablet(s) per day 25 tablet 0     OTC products: None, except as noted above    Allergies   Allergen Reactions     No Known Drug Allergies       Latex Allergy: NO    Social History   Substance Use Topics      Smoking status: Current Every Day Smoker     Packs/day: 1.00     Years: 20.00     Types: Cigarettes     Smokeless tobacco: Never Used     Alcohol use No      Comment: rare     History   Drug Use No       REVIEW OF SYSTEMS:                                                    C: NEGATIVE for fever, chills, change in weight  E/M: NEGATIVE for ear, mouth and throat problems  R: NEGATIVE for significant cough or SOB  CV: NEGATIVE for chest pain, palpitations or peripheral edema    EXAM:                                                    /74 (BP Location: Right arm, Patient Position: Chair, Cuff Size: Adult Large)  Pulse 80  Temp 98.5  F (36.9  C) (Tympanic)  Resp 14  Wt (!) 337 lb 6 oz (153 kg)  SpO2 100%  BMI 43.2 kg/m2  GENERAL APPEARANCE: healthy, alert and no distress  HENT: ear canals and TM's normal and nose and mouth without ulcers or lesions  RESP: lungs clear to auscultation - no rales, rhonchi or wheezes  CV: regular rate and rhythm, normal S1 S2, no S3 or S4 and no murmur, click or rub   ABDOMEN: soft, nontender, no HSM or masses and bowel sounds normal  NEURO: Normal strength and tone, sensory exam grossly normal, mentation intact and speech normal    DIAGNOSTICS:                                                    No labs or EKG required for low risk surgery (cataract, skin procedure, breast biopsy, etc)    Recent Labs   Lab Test  11/27/12 2039   HGB  15.0   PLT  159        IMPRESSION:                                                    Reason for surgery/procedure: Chronic Back Pain   Diagnosis/reason for consult: Pre-op     The proposed surgical procedure is considered LOW risk.    REVISED CARDIAC RISK INDEX  The patient has the following serious cardiovascular risks for perioperative complications such as (MI, PE, VFib and 3  AV Block):  No serious cardiac risks  INTERPRETATION: 0 risks: Class I (very low risk - 0.4% complication rate)    The patient has the following additional risks for  perioperative complications:  No identified additional risks      ICD-10-CM    1. Preop general physical exam Z01.818        RECOMMENDATIONS:                                                              APPROVAL GIVEN to proceed with proposed procedure, without further diagnostic evaluation       Signed Electronically by: Abner Moreno MD    Copy of this evaluation report is provided to requesting physician.    [facility] Preop Guidelines

## 2017-10-26 ENCOUNTER — HOSPITAL ENCOUNTER (OUTPATIENT)
Facility: CLINIC | Age: 37
Discharge: HOME | End: 2017-10-26
Attending: ANESTHESIOLOGY | Admitting: ANESTHESIOLOGY
Payer: OTHER MISCELLANEOUS

## 2017-10-26 ENCOUNTER — HOSPITAL ENCOUNTER (OUTPATIENT)
Dept: GENERAL RADIOLOGY | Facility: CLINIC | Age: 37
End: 2017-10-26
Attending: ANESTHESIOLOGY | Admitting: ANESTHESIOLOGY
Payer: OTHER MISCELLANEOUS

## 2017-10-26 ENCOUNTER — ANESTHESIA EVENT (OUTPATIENT)
Dept: SURGERY | Facility: CLINIC | Age: 37
End: 2017-10-26
Payer: OTHER MISCELLANEOUS

## 2017-10-26 ENCOUNTER — ANESTHESIA (OUTPATIENT)
Dept: SURGERY | Facility: CLINIC | Age: 37
End: 2017-10-26
Payer: OTHER MISCELLANEOUS

## 2017-10-26 VITALS
RESPIRATION RATE: 16 BRPM | TEMPERATURE: 97.8 F | DIASTOLIC BLOOD PRESSURE: 98 MMHG | OXYGEN SATURATION: 94 % | SYSTOLIC BLOOD PRESSURE: 128 MMHG

## 2017-10-26 DIAGNOSIS — M54.50 LUMBAR SPINE PAIN: ICD-10-CM

## 2017-10-26 DIAGNOSIS — S39.92XD INJURY OF BACK, SUBSEQUENT ENCOUNTER: Primary | ICD-10-CM

## 2017-10-26 PROCEDURE — 37000009 ZZH ANESTHESIA TECHNICAL FEE, EACH ADDTL 15 MIN: Performed by: ANESTHESIOLOGY

## 2017-10-26 PROCEDURE — 62290 NJX PX DISCOGRAPHY LUMBAR: CPT | Performed by: ANESTHESIOLOGY

## 2017-10-26 PROCEDURE — 25000128 H RX IP 250 OP 636: Performed by: ANESTHESIOLOGY

## 2017-10-26 PROCEDURE — 37000008 ZZH ANESTHESIA TECHNICAL FEE, 1ST 30 MIN: Performed by: ANESTHESIOLOGY

## 2017-10-26 PROCEDURE — 25000125 ZZHC RX 250: Performed by: NURSE ANESTHETIST, CERTIFIED REGISTERED

## 2017-10-26 PROCEDURE — 25000125 ZZHC RX 250: Performed by: ANESTHESIOLOGY

## 2017-10-26 PROCEDURE — 25000128 H RX IP 250 OP 636: Performed by: NURSE ANESTHETIST, CERTIFIED REGISTERED

## 2017-10-26 PROCEDURE — 40000277 XR SURGERY CARM FLUORO LESS THAN 5 MIN W STILLS: Mod: TC

## 2017-10-26 RX ORDER — HYDROMORPHONE HYDROCHLORIDE 1 MG/ML
0.5 INJECTION, SOLUTION INTRAMUSCULAR; INTRAVENOUS; SUBCUTANEOUS EVERY 30 MIN PRN
Status: DISCONTINUED | OUTPATIENT
Start: 2017-10-26 | End: 2017-10-26 | Stop reason: HOSPADM

## 2017-10-26 RX ORDER — ALBUTEROL SULFATE 0.83 MG/ML
2.5 SOLUTION RESPIRATORY (INHALATION) EVERY 4 HOURS PRN
Status: DISCONTINUED | OUTPATIENT
Start: 2017-10-26 | End: 2017-10-26 | Stop reason: HOSPADM

## 2017-10-26 RX ORDER — ONDANSETRON 4 MG/1
4 TABLET, ORALLY DISINTEGRATING ORAL EVERY 30 MIN PRN
Status: DISCONTINUED | OUTPATIENT
Start: 2017-10-26 | End: 2017-10-26 | Stop reason: HOSPADM

## 2017-10-26 RX ORDER — SODIUM CHLORIDE, SODIUM LACTATE, POTASSIUM CHLORIDE, CALCIUM CHLORIDE 600; 310; 30; 20 MG/100ML; MG/100ML; MG/100ML; MG/100ML
INJECTION, SOLUTION INTRAVENOUS CONTINUOUS
Status: DISCONTINUED | OUTPATIENT
Start: 2017-10-26 | End: 2017-10-26 | Stop reason: HOSPADM

## 2017-10-26 RX ORDER — FENTANYL CITRATE 50 UG/ML
50 INJECTION, SOLUTION INTRAMUSCULAR; INTRAVENOUS
Status: DISCONTINUED | OUTPATIENT
Start: 2017-10-26 | End: 2017-10-26 | Stop reason: HOSPADM

## 2017-10-26 RX ORDER — LIDOCAINE HYDROCHLORIDE 40 MG/ML
INJECTION, SOLUTION RETROBULBAR PRN
Status: DISCONTINUED | OUTPATIENT
Start: 2017-10-26 | End: 2017-10-26 | Stop reason: HOSPADM

## 2017-10-26 RX ORDER — PROPOFOL 10 MG/ML
INJECTION, EMULSION INTRAVENOUS PRN
Status: DISCONTINUED | OUTPATIENT
Start: 2017-10-26 | End: 2017-10-26

## 2017-10-26 RX ORDER — CEFAZOLIN SODIUM 1 G/3ML
1 INJECTION, POWDER, FOR SOLUTION INTRAMUSCULAR; INTRAVENOUS
Status: COMPLETED | OUTPATIENT
Start: 2017-10-26 | End: 2017-10-26

## 2017-10-26 RX ORDER — FENTANYL CITRATE 50 UG/ML
25-50 INJECTION, SOLUTION INTRAMUSCULAR; INTRAVENOUS
Status: DISCONTINUED | OUTPATIENT
Start: 2017-10-26 | End: 2017-10-26 | Stop reason: HOSPADM

## 2017-10-26 RX ORDER — ONDANSETRON 2 MG/ML
4 INJECTION INTRAMUSCULAR; INTRAVENOUS EVERY 30 MIN PRN
Status: DISCONTINUED | OUTPATIENT
Start: 2017-10-26 | End: 2017-10-26 | Stop reason: HOSPADM

## 2017-10-26 RX ORDER — DIMENHYDRINATE 50 MG/ML
25 INJECTION, SOLUTION INTRAMUSCULAR; INTRAVENOUS
Status: DISCONTINUED | OUTPATIENT
Start: 2017-10-26 | End: 2017-10-26 | Stop reason: HOSPADM

## 2017-10-26 RX ORDER — TRIAMCINOLONE ACETONIDE 40 MG/ML
INJECTION, SUSPENSION INTRA-ARTICULAR; INTRAMUSCULAR PRN
Status: DISCONTINUED | OUTPATIENT
Start: 2017-10-26 | End: 2017-10-26 | Stop reason: HOSPADM

## 2017-10-26 RX ORDER — LIDOCAINE 40 MG/G
CREAM TOPICAL
Status: DISCONTINUED | OUTPATIENT
Start: 2017-10-26 | End: 2017-10-26 | Stop reason: HOSPADM

## 2017-10-26 RX ORDER — HYDROMORPHONE HYDROCHLORIDE 1 MG/ML
.3-.5 INJECTION, SOLUTION INTRAMUSCULAR; INTRAVENOUS; SUBCUTANEOUS EVERY 10 MIN PRN
Status: DISCONTINUED | OUTPATIENT
Start: 2017-10-26 | End: 2017-10-26 | Stop reason: HOSPADM

## 2017-10-26 RX ORDER — NALOXONE HYDROCHLORIDE 0.4 MG/ML
.1-.4 INJECTION, SOLUTION INTRAMUSCULAR; INTRAVENOUS; SUBCUTANEOUS
Status: DISCONTINUED | OUTPATIENT
Start: 2017-10-26 | End: 2017-10-26 | Stop reason: HOSPADM

## 2017-10-26 RX ORDER — LIDOCAINE HYDROCHLORIDE 20 MG/ML
INJECTION, SOLUTION INFILTRATION; PERINEURAL PRN
Status: DISCONTINUED | OUTPATIENT
Start: 2017-10-26 | End: 2017-10-26

## 2017-10-26 RX ORDER — KETOROLAC TROMETHAMINE 30 MG/ML
INJECTION, SOLUTION INTRAMUSCULAR; INTRAVENOUS PRN
Status: DISCONTINUED | OUTPATIENT
Start: 2017-10-26 | End: 2017-10-26

## 2017-10-26 RX ORDER — OXYCODONE AND ACETAMINOPHEN 5; 325 MG/1; MG/1
1 TABLET ORAL EVERY 6 HOURS PRN
Qty: 120 TABLET | Refills: 0 | Status: SHIPPED | OUTPATIENT
Start: 2017-10-26 | End: 2018-02-14

## 2017-10-26 RX ADMIN — KETOROLAC TROMETHAMINE 30 MG: 30 INJECTION, SOLUTION INTRAMUSCULAR at 10:05

## 2017-10-26 RX ADMIN — HYDROMORPHONE HYDROCHLORIDE 0.5 MG: 1 INJECTION, SOLUTION INTRAMUSCULAR; INTRAVENOUS; SUBCUTANEOUS at 10:05

## 2017-10-26 RX ADMIN — PROPOFOL 50 MG: 10 INJECTION, EMULSION INTRAVENOUS at 09:45

## 2017-10-26 RX ADMIN — LIDOCAINE HYDROCHLORIDE 1 ML: 10 INJECTION, SOLUTION EPIDURAL; INFILTRATION; INTRACAUDAL; PERINEURAL at 09:04

## 2017-10-26 RX ADMIN — PROPOFOL 50 MG: 10 INJECTION, EMULSION INTRAVENOUS at 09:44

## 2017-10-26 RX ADMIN — MIDAZOLAM HYDROCHLORIDE 2 MG: 1 INJECTION, SOLUTION INTRAMUSCULAR; INTRAVENOUS at 09:33

## 2017-10-26 RX ADMIN — CEFAZOLIN 1 G: 1 INJECTION, POWDER, FOR SOLUTION INTRAMUSCULAR; INTRAVENOUS at 09:24

## 2017-10-26 RX ADMIN — HYDROMORPHONE HYDROCHLORIDE 0.5 MG: 1 INJECTION, SOLUTION INTRAMUSCULAR; INTRAVENOUS; SUBCUTANEOUS at 10:50

## 2017-10-26 RX ADMIN — CEFAZOLIN 1 G: 1 INJECTION, POWDER, FOR SOLUTION INTRAMUSCULAR; INTRAVENOUS at 09:30

## 2017-10-26 RX ADMIN — SODIUM CHLORIDE, POTASSIUM CHLORIDE, SODIUM LACTATE AND CALCIUM CHLORIDE: 600; 310; 30; 20 INJECTION, SOLUTION INTRAVENOUS at 09:04

## 2017-10-26 RX ADMIN — PROPOFOL 50 MG: 10 INJECTION, EMULSION INTRAVENOUS at 09:46

## 2017-10-26 RX ADMIN — LIDOCAINE HYDROCHLORIDE 40 MG: 20 INJECTION, SOLUTION INFILTRATION; PERINEURAL at 09:37

## 2017-10-26 RX ADMIN — PROPOFOL 100 MG: 10 INJECTION, EMULSION INTRAVENOUS at 09:39

## 2017-10-26 RX ADMIN — PROPOFOL 50 MG: 10 INJECTION, EMULSION INTRAVENOUS at 09:43

## 2017-10-26 RX ADMIN — MIDAZOLAM HYDROCHLORIDE 2 MG: 1 INJECTION, SOLUTION INTRAMUSCULAR; INTRAVENOUS at 10:04

## 2017-10-26 RX ADMIN — SODIUM CHLORIDE, POTASSIUM CHLORIDE, SODIUM LACTATE AND CALCIUM CHLORIDE: 600; 310; 30; 20 INJECTION, SOLUTION INTRAVENOUS at 09:28

## 2017-10-26 RX ADMIN — HYDROMORPHONE HYDROCHLORIDE 0.5 MG: 1 INJECTION, SOLUTION INTRAMUSCULAR; INTRAVENOUS; SUBCUTANEOUS at 10:09

## 2017-10-26 RX ADMIN — PROPOFOL 30 MG: 10 INJECTION, EMULSION INTRAVENOUS at 10:03

## 2017-10-26 RX ADMIN — FENTANYL CITRATE 50 MCG: 50 INJECTION, SOLUTION INTRAMUSCULAR; INTRAVENOUS at 10:35

## 2017-10-26 ASSESSMENT — LIFESTYLE VARIABLES: TOBACCO_USE: 1

## 2017-10-26 NOTE — DISCHARGE INSTRUCTIONS
Home Care Instructions    [facility]- [phone]               Procedure:  Discogram    Activity:    Rest today    Do not work today    Resume normal activity tomorrow    Pain:    You may experience soreness at the injection site for one or two days    You may use an ice pack for 20 minutes every 2 hours for the first 24 hours    You may use a heating pad after the first 24 hours    You may use Tylenol  (acetaminophen) every 4 hours or other pain medicines as directed by your physician    Safety  Sedation medicine, if given may remain active for many hours.    It is important for the next 24 hours that you do not:    Drive a car    Operate machines or power tools    Consume alcohol, including beer    Sign any important papers or legal documents    You may experience numbness radiating into your legs or arms, (depending on the procedure location)  This numbness may last several hours.  Until the numb sensation returns to normal please use caution in walking, climbing stairs, stepping out of your vehicle, etc.    Common side effects of steroids:  Not everyone will experience corticosteroid side effects. If side effects are experienced they will gradually subside in the 7-10 day period following an injection.    Most common side effects include:    Flushed face and/or chest    Feeling of warmth, particularly in face but could be overall feeling of warmth    Increased blood sugar in diabetic patients    Menstrual irregularities may occur.  If taking hormone based birth control an alternate method of birth control is recommended    Sleep disturbances and/or mood swings are possible    Leg cramps    Please contact us if you have:  Severe pain   Fever more than 101.5 degrees Fahrenheit  Signs of infection (redness, swelling or drainage)      If you have questions, please contact our office at [facility]- [phone] between the hours of 8:00am and  5:00pm Monday through Friday.  After office hours you can contact the on call provider by dialing [phone] and waiting for a call service to answer.  If you need immediate attention we recommend that you go to a hospital emergency room or dial 111.

## 2017-10-26 NOTE — ANESTHESIA PREPROCEDURE EVALUATION
Anesthesia Evaluation     . Pt has had prior anesthetic. Type: General    No history of anesthetic complications          ROS/MED HX    ENT/Pulmonary:     (+)GEN risk factors snores loudly, obese, tobacco use, Current use 1 packs/day  , . .    Neurologic:  - neg neurologic ROS     Cardiovascular:  - neg cardiovascular ROS       METS/Exercise Tolerance:     Hematologic:  - neg hematologic  ROS       Musculoskeletal:   (+) , , other musculoskeletal- lumbar pain      GI/Hepatic:     (+) appendicitis,       Renal/Genitourinary:  - ROS Renal section negative       Endo:     (+) Obesity, .      Psychiatric:  - neg psychiatric ROS       Infectious Disease:  - neg infectious disease ROS       Malignancy:      - no malignancy   Other:    - neg other ROS                 Physical Exam  Normal systems: cardiovascular, pulmonary and dental    Airway   Mallampati: III  TM distance: >3 FB  Neck ROM: full    Dental     Cardiovascular   Rhythm and rate: regular and normal      Pulmonary    breath sounds clear to auscultation                    Anesthesia Plan      History & Physical Review  History and physical reviewed and following examination; no interval change.    ASA Status:  2 .    NPO Status:  > 8 hours    Plan for MAC with Propofol induction. Maintenance will be TIVA.  Reason for MAC:  Deep or markedly invasive procedure (G8)         Postoperative Care  Postoperative pain management:  Oral pain medications.      Consents  Anesthetic plan, risks, benefits and alternatives discussed with:  Patient.  Use of blood products discussed: No .   .                          .

## 2017-10-26 NOTE — IP AVS SNAPSHOT
[facility] Phase II    [address]     [place] [address]    Phone:  [phone]                                       After Visit Summary   10/26/2017    Sidney Plasencia    MRN: [id number]           After Visit Summary Signature Page     I have received my discharge instructions, and my questions have been answered. I have discussed any challenges I see with this plan with the nurse or doctor.    ..........................................................................................................................................  Patient/Patient Representative Signature      ..........................................................................................................................................  Patient Representative Print Name and Relationship to Patient    ..................................................               ................................................  Date                                            Time    ..........................................................................................................................................  Reviewed by Signature/Title    ...................................................              ..............................................  Date                                                            Time

## 2017-10-26 NOTE — BRIEF OP NOTE
[facility] Brief Operative Note    Pre-operative diagnosis: S/P MBB, lumbar disc herniations   Post-operative diagnosis: Same   Procedure: Discogram L2-S1   Surgeon: Emmett Galvez MD, MD   Assistant(s): None   Anesthesia: MAC   Estimated blood loss: Minimal   Total IV fluids: (See anesthesia record)   Blood transfusion: No transfusion was given during surgery   Total urine output: Not measured   Drains: None   Specimens: None   Implants: None   Findings: None   Complications: None   Condition: Stable   Comments: See dictated operative report for full details

## 2017-10-26 NOTE — IP AVS SNAPSHOT
MRN:[id number]                      After Visit Summary   10/26/2017    Sidney Plasencia    MRN: [id number]           Thank you!     Thank you for choosing [facility] for your care. Our goal is always to provide you with excellent care. Hearing back from our patients is one way we can continue to improve our services. Please take a few minutes to complete the written survey that you may receive in the mail after you visit with us. Thank you!        Patient Information     Date Of Birth          1980        About your hospital stay     You were admitted on:  October 26, 2017 You last received care in the:  [facility] Phase II    You were discharged on:  October 26, 2017       Who to Call     For medical emergencies, please call 911.  For non-urgent questions about your medical care, please call your primary care provider or clinic, [phone]  For questions related to your surgery, please call your surgery clinic        Attending Provider     Provider Specialty    Emmett Galvez MD [facility]       Primary Care Provider Office Phone # Fax #    Abner Moreno [phone]      After Care Instructions     Discharge Instructions       Review outpatient procedure discharge instructions as directed by Provider. You need to f/u with Dr. Wolf Greer to review the results of this discogram. Please call to -confirm he has received the dictated results from today's test ([facility]).   I recommend allowing 4 days for my dictation to arrive at Dr. Bunn's office.                  Further instructions from your care team       Home Care Instructions    [facility] [place]- [phone]               Procedure:  Discogram    Activity:    Rest today    Do not work today    Resume normal activity tomorrow    Pain:    You may experience soreness at the injection site for one or two days    You may use an ice pack for 20 minutes every 2 hours  for the first 24 hours    You may use a heating pad after the first 24 hours    You may use Tylenol  (acetaminophen) every 4 hours or other pain medicines as directed by your physician    Safety  Sedation medicine, if given may remain active for many hours.    It is important for the next 24 hours that you do not:    Drive a car    Operate machines or power tools    Consume alcohol, including beer    Sign any important papers or legal documents    You may experience numbness radiating into your legs or arms, (depending on the procedure location)  This numbness may last several hours.  Until the numb sensation returns to normal please use caution in walking, climbing stairs, stepping out of your vehicle, etc.    Common side effects of steroids:  Not everyone will experience corticosteroid side effects. If side effects are experienced they will gradually subside in the 7-10 day period following an injection.    Most common side effects include:    Flushed face and/or chest    Feeling of warmth, particularly in face but could be overall feeling of warmth    Increased blood sugar in diabetic patients    Menstrual irregularities may occur.  If taking hormone based birth control an alternate method of birth control is recommended    Sleep disturbances and/or mood swings are possible    Leg cramps    Please contact us if you have:  Severe pain   Fever more than 101.5 degrees Fahrenheit  Signs of infection (redness, swelling or drainage)      If you have questions, please contact our office at [facility]- [phone] between the hours of 8:00am and 5:00pm Monday through Friday.  After office hours you can contact the on call provider by dialing [phone] and waiting for a call service to answer.  If you need immediate attention we recommend that you go to a hospital emergency room or dial [phone].                 Pending Results     No orders found from 10/24/2017 to 10/27/2017.           "  Admission Information     Date & Time Provider Department Dept. Phone    10/26/2017 Emmett Galvez MD [facility] Phase [phone]      Your Vitals Were     Blood Pressure Temperature Respirations Pulse Oximetry          109/64 97.8  F (36.6  C) (Oral) 16 94%        MyChart Information     [employer] lets you send messages to your doctor, view your test results, renew your prescriptions, schedule appointments and more. To sign up, go to [email or web address] . Click on \"Log in\" on the left side of the screen, which will take you to the Welcome page. Then click on \"Sign up Now\" on the right side of the page.     You will be asked to enter the access code listed below, as well as some personal information. Please follow the directions to create your username and password.     Your access code is: [email or web address]  Expires: 2018  5:34 PM     Your access code will  in 90 days. If you need help or a new code, please call your [facility] clinic or [phone].        Care EveryWhere ID     This is your Care EveryWhere ID. This could be used by other organizations to access your [facility] medical records  [id number]        Equal Access to Services     TYRESE LEE : Hadii fernanda segurao Soviraali, waaxda luqadaha, qaybta kaalmada adeegyada, suzette gutierres. So [facility] [phone].    ATENCIÓN: Si habla español, tiene a de jesus disposición servicios gratuitos de asistencia lingüística. Llame al [phone].    We comply with applicable federal civil rights laws and [place] laws. We do not discriminate on the basis of race, color, national origin, age, disability, sex, sexual orientation, or gender identity.               Review of your medicines      CONTINUE these medicines which may have CHANGED, or have new prescriptions. If we are uncertain of the size of tablets/capsules you have at home, strength may be listed as something that might have changed.        Dose / Directions    * " oxyCODONE-acetaminophen 5-325 MG per tablet   Commonly known as:  PERCOCET   This may have changed:  Another medication with the same name was added. Make sure you understand how and when to take each.   Used for:  Back injury, subsequent encounter        maximum 3 tablet(s) per day   Quantity:  25 tablet   Refills:  0       * oxyCODONE-acetaminophen 5-325 MG per tablet   Commonly known as:  PERCOCET   This may have changed:  You were already taking a medication with the same name, and this prescription was added. Make sure you understand how and when to take each.   Used for:  Injury of back, subsequent encounter        Dose:  1 tablet   Take 1 tablet by mouth every 6 hours as needed for pain maximum 4 tablet(s) per day   Quantity:  120 tablet   Refills:  0       * Notice:  This list has 2 medication(s) that are the same as other medications prescribed for you. Read the directions carefully, and ask your doctor or other care provider to review them with you.         Where to get your medicines      Some of these will need a paper prescription and others can be bought over the counter. Ask your nurse if you have questions.     Bring a paper prescription for each of these medications     oxyCODONE-acetaminophen 5-325 MG per tablet                Protect others around you: Learn how to safely use, store and throw away your medicines at www.disposemymeds.org.             Medication List: This is a list of all your medications and when to take them. Check marks below indicate your daily home schedule. Keep this list as a reference.      Medications           Morning Afternoon Evening Bedtime As Needed    * oxyCODONE-acetaminophen 5-325 MG per tablet   Commonly known as:  PERCOCET   maximum 3 tablet(s) per day                                * oxyCODONE-acetaminophen 5-325 MG per tablet   Commonly known as:  PERCOCET   Take 1 tablet by mouth every 6 hours as needed for pain maximum 4 tablet(s) per day                                 * Notice:  This list has 2 medication(s) that are the same as other medications prescribed for you. Read the directions carefully, and ask your doctor or other care provider to review them with you.

## 2017-10-26 NOTE — ANESTHESIA CARE TRANSFER NOTE
Patient: Sidney Plasencia    Procedure(s):  discogram lumbar 2, 3, 4, 5 & S1 - Wound Class: I-Clean    Diagnosis: S/P MBB, lumbar spine pain  Diagnosis Additional Information: No value filed.    Anesthesia Type:   MAC     Note:  Airway :Room Air  Patient transferred to:Phase II  Handoff Report: Identifed the Patient, Identified the Reponsible Provider, Reviewed the pertinent medical history, Discussed the surgical course, Reviewed Intra-OP anesthesia mangement and issues during anesthesia, Set expectations for post-procedure period and Allowed opportunity for questions and acknowledgement of understanding      Vitals: (Last set prior to Anesthesia Care Transfer)    CRNA VITALS  10/26/2017 0942 - 10/26/2017 1018      10/26/2017             SpO2: 96 %    Resp Rate (observed): 23                Electronically Signed By: RADHA Epperson CRNA  October 26, 2017  10:18 AM

## 2017-10-26 NOTE — ANESTHESIA POSTPROCEDURE EVALUATION
Patient: Sidney Plasencia    Procedure(s):  discogram lumbar 2, 3, 4, 5 & S1 - Wound Class: I-Clean    Diagnosis:S/P MBB, lumbar spine pain  Diagnosis Additional Information: No value filed.    Anesthesia Type:  MAC    Note:  Anesthesia Post Evaluation    Patient location during evaluation: Phase 2  Patient participation: Able to fully participate in evaluation  Level of consciousness: awake  Pain management: adequate  Airway patency: patent  Cardiovascular status: acceptable and hemodynamically stable  Respiratory status: acceptable, room air and nonlabored ventilation  Hydration status: stable  PONV: none     Anesthetic complications: None    Comments: Patient was happy with the anesthesia care received and no anesthesia related complications were noted.  I will follow up with the patient again if it is needed.        Last vitals:  Vitals:    10/26/17 1015 10/26/17 1039 10/26/17 1056   BP: 109/64 (!) 160/111 (!) 128/98   Resp:  16    Temp:      SpO2: 94% 94%          Electronically Signed By: RADHA Epperson CRNA  October 26, 2017  11:07 AM

## 2017-10-27 NOTE — OP NOTE
PREOPERATIVE DIAGNOSIS:  Low back pain secondary to lumbar disk herniations.      POSTOPERATIVE DIAGNOSIS:  Low back pain secondary to lumbar disk herniations.      ANESTHESIA:  Monitored anesthetic care.      INTERVAL HISTORY:  Sidney Plasencia was sent to me for a lumbar diskogram.  The plan originally was to do L3 through S1.  I did tell him in the preop area that, if we did not find a control disk, I would add 1 level which would include the L2-3 level.  He was willing to proceed.  I did consent him prior to the procedure, including risks of infection, being 1:10,000 per disk tested, as well as bleeding, nerve injury, no help from the procedure or nondiagnostic procedure.  He was still willing to proceed.      PROCEDURE:  Lumbar discography at L2-3, L3-4, L4-5, L5-S1 with radiographic interpretation.      TECHNIQUE:  After written and verbal informed consent was obtained, he was brought to the procedure room.  An IV was started.  He was given 1 gm of IV cefazolin.  His back was sterilely prepped and draped in the usual fashion with ChloraPrep.  I was sterilely gloved and gowned for the entire procedure.  I used a needle-through-needle technique using 16-gauge Angiocaths and advanced these into the skin after anesthetizing the skin with 1% lidocaine.  Through the 16-gauge Angiocath, I advanced a 22-gauge 7-inch Quincke needle from the right oblique approach.  Needles were placed in the centerS of the L3-4, L4-5 and L5-S1 disks, and after testing, I added an additional level and added a needle and disk level at the L2-3 level.  Since all 3 disks at the L3 through S1 level became positive, therefore I needed a control level to make this a valid test.  Notation was made regarding the patient's report of pain or pressure during pressurization with contrast.  Each disk was injected with Omnipaque contrast containing 5 mg/mL of cefazolin.      RADIOGRAPHIC INTERPRETATION OF DISKS:   1.  L2-3 disk:  No loss of disk  height was noted at this level.  There was no significant annular fissuring at this level.  There was a bilobular nucleus created by the contrast.  Opening pressure at this level was 6 mmHg.  The pain reproduced at this level went from a 3/10 up to a 4/10, with no significant discomfort noted upon pressurization of this disk.   2.  L3-4 disk:  This disk showed moderate loss of disk height.  There was annular tearing throughout the disk, but the disk was contained with no extravasation of contrast outside the disk.  A total of 3.4 mL of Omnipaque/antibiotic solution was injected into this disk. Pain went from a 3/10 upon pressurization, with an opening pressure off 12 mmHg, all the way up to an 8-9/10 after final pressurization was noted.  Pain was reproduced at about 30 mmHg above opening pressure.  He rated the pain as concordant.   3.  L4-5 disk:  Moderate loss of disk height was noted at this level.  There was annular tearing out to the outer ring of the annulus but no rupture of the annulus, and there was no extravasation of contrast into the epidural space, i.e., this was a contained disk.  Pain reproduced after injecting 3.4 mL of contrast, was rated as going from a 3/10 all the way up to an 8/10, and it was concordant in location and in character.   4.  L5-S1 disk:  This disk showed only a mild loss of disk height but severe degenerative changes in the annulus but no extravasation of contrast out of the disk upon pressurization.  In other words, this was a contained disk.  The pain reproduced went from a 3/10 all the way up to a 7/10 upon pressurization.  Opening pressure was approximately 8 mmHg.      CONCLUSIONS:   1.  His L2-3 disk served as an adequate control disk.  There was no significant pain upon pressurization of this disk.  Morphologically, the disk looked healthy with no loss of disk height or annular tearing.   2.  L3-4 disk:  This disk was the most concordant upon pressurization.  I reproduced  concordant pain in location and character, based upon his usual pain.   3.  L4-5 disk:  This disk also reproduced concordant pain in location and in character.  There were severe degenerative changes noted at this level.   4.  L5-S1 disk:  This disk also reproduced concordant pain in location and in character.  There was also severe degenerative changes noted at this level.      I believe the results of this discogram are valid and reproducible.      FOLLOWUP:  He will follow up with Dr. Wolf Greer at the [facility] in [place].         RACHEL LOUIS MD             D: 10/26/2017 10:23   T: 10/26/2017 16:02   MT: [id number]      Name:     JOHANNY STEELE   MRN:      [id number]        Account:        [id number]   :      1980           Procedure Date: 10/26/2017      Document: [id number]       cc: Copy for Provider        [facility]

## 2017-11-13 ENCOUNTER — ALLIED HEALTH/NURSE VISIT (OUTPATIENT)
Dept: FAMILY MEDICINE | Facility: CLINIC | Age: 37
End: 2017-11-13
Payer: COMMERCIAL

## 2017-11-13 DIAGNOSIS — Z23 NEED FOR PROPHYLACTIC VACCINATION AND INOCULATION AGAINST INFLUENZA: Primary | ICD-10-CM

## 2017-11-13 PROCEDURE — 90686 IIV4 VACC NO PRSV 0.5 ML IM: CPT

## 2017-11-13 PROCEDURE — 99207 ZZC NO CHARGE NURSE ONLY: CPT

## 2017-11-13 PROCEDURE — 90471 IMMUNIZATION ADMIN: CPT

## 2017-11-14 NOTE — PROGRESS NOTES
Injectable Influenza Immunization Documentation    1.  Is the person to be vaccinated sick today?   No    2. Does the person to be vaccinated have an allergy to a component   of the vaccine?   No  Egg Allergy Algorithm Link    3. Has the person to be vaccinated ever had a serious reaction   to influenza vaccine in the past?   No    4. Has the person to be vaccinated ever had Guillain-Barré syndrome?   No    Form completed by Elaine Mistry CMA    Prior to injection verified patient identity using patient's name and date of birth. Pt advised to stay in clinic for 20 minutes after flu shot and report any reactions to us right away.

## 2018-02-14 ENCOUNTER — OFFICE VISIT (OUTPATIENT)
Dept: FAMILY MEDICINE | Facility: CLINIC | Age: 38
End: 2018-02-14
Payer: OTHER MISCELLANEOUS

## 2018-02-14 ENCOUNTER — HOSPITAL ENCOUNTER (OUTPATIENT)
Dept: GENERAL RADIOLOGY | Facility: CLINIC | Age: 38
Discharge: HOME | End: 2018-02-14
Attending: FAMILY MEDICINE | Admitting: FAMILY MEDICINE
Payer: OTHER MISCELLANEOUS

## 2018-02-14 VITALS
OXYGEN SATURATION: 98 % | TEMPERATURE: 98.1 F | BODY MASS INDEX: 43.42 KG/M2 | HEART RATE: 79 BPM | SYSTOLIC BLOOD PRESSURE: 136 MMHG | RESPIRATION RATE: 16 BRPM | WEIGHT: 315 LBS | DIASTOLIC BLOOD PRESSURE: 82 MMHG

## 2018-02-14 DIAGNOSIS — Z01.818 PREOP GENERAL PHYSICAL EXAM: Primary | ICD-10-CM

## 2018-02-14 DIAGNOSIS — F17.210 CIGARETTE NICOTINE DEPENDENCE WITHOUT COMPLICATION: ICD-10-CM

## 2018-02-14 PROCEDURE — 71046 X-RAY EXAM CHEST 2 VIEWS: CPT | Mod: TC

## 2018-02-14 PROCEDURE — 99214 OFFICE O/P EST MOD 30 MIN: CPT | Performed by: FAMILY MEDICINE

## 2018-02-14 ASSESSMENT — PAIN SCALES - GENERAL: PAINLEVEL: SEVERE PAIN (6)

## 2018-02-14 NOTE — NURSING NOTE
"Chief Complaint   Patient presents with     Work Comp     from 2014 back injury     Pre-Op Exam     03/01/18       Initial /82  Pulse 79  Temp 98.1  F (36.7  C) (Tympanic)  Resp 16  Wt (!) 339 lb 2 oz (153.8 kg)  SpO2 98%  BMI 43.42 kg/m2 Estimated body mass index is 43.42 kg/(m^2) as calculated from the following:    Height as of 6/28/17: 6' 2.1\" (1.882 m).    Weight as of this encounter: 339 lb 2 oz (153.8 kg).  Medication Reconciliation: complete   Health Maintenance Due   Topic Date Due     LIPID SCREEN Q5 YR MALE (SYSTEM ASSIGNED)  08/06/2015     Tawnya Means, [facility]      "

## 2018-02-14 NOTE — MR AVS SNAPSHOT
After Visit Summary   2/14/2018    Sidney Plasencia    MRN: [id number]           Patient Information     Date Of Birth          1980        Visit Information        Provider Department      2/14/2018 1:10 PM Abner Moreno MD [facility]        Today's Diagnoses     Preop general physical exam    -  1    Cigarette nicotine dependence without complication          Care Instructions      Before Your Surgery      Call your surgeon if there is any change in your health. This includes signs of a cold or flu (such as a sore throat, runny nose, cough, rash or fever).    Do not smoke, drink alcohol or take over the counter medicine (unless your surgeon or primary care doctor tells you to) for the 24 hours before and after surgery.    If you take prescribed drugs: Follow your doctor s orders about which medicines to take and which to stop until after surgery.    Eating and drinking prior to surgery: follow the instructions from your surgeon    Take a shower or bath the night before surgery. Use the soap your surgeon gave you to gently clean your skin. If you do not have soap from your surgeon, use your regular soap. Do not shave or scrub the surgery site.  Wear clean pajamas and have clean sheets on your bed.           Follow-ups after your visit        Future tests that were ordered for you today     Open Future Orders        Priority Expected Expires Ordered    XR Chest 2 Views Routine 2/14/2018 2/14/2019 2/14/2018            Who to contact     If you have questions or need follow up information about today's clinic visit or your schedule please contact [facility] directly at [phone].  Normal or non-critical lab and imaging results will be communicated to you by MyChart, letter or phone within 4 business days after the clinic has received the results. If you do not hear from us within 7 days, please contact the clinic through MyChart or phone. If you have a critical  "or abnormal lab result, we will notify you by phone as soon as possible.  Submit refill requests through Threadbox or call your pharmacy and they will forward the refill request to us. Please allow 3 business days for your refill to be completed.          Additional Information About Your Visit        [email or web address] Information     Threadbox lets you send messages to your doctor, view your test results, renew your prescriptions, schedule appointments and more. To sign up, go to [email or web address] . Click on \"Log in\" on the left side of the screen, which will take you to the Welcome page. Then click on \"Sign up Now\" on the right side of the page.     You will be asked to enter the access code listed below, as well as some personal information. Please follow the directions to create your username and password.     Your access code is: [email or web address]  Expires: 5/15/2018  2:37 PM     Your access code will  in 90 days. If you need help or a new code, please call your [facility] clinic or [phone].        Care EveryWhere ID     This is your Care EveryWhere ID. This could be used by other organizations to access your [facility] medical records  [id number]        Your Vitals Were     Pulse Temperature Respirations Pulse Oximetry BMI (Body Mass Index)       79 98.1  F (36.7  C) (Tympanic) 16 98% 43.42 kg/m2        Blood Pressure from Last 3 Encounters:   18 136/82   10/26/17 (!) 128/98   10/23/17 140/74    Weight from Last 3 Encounters:   18 (!) 339 lb 2 oz (153.8 kg)   10/23/17 (!) 337 lb 6 oz (153 kg)   17 (!) 338 lb (153.3 kg)               Primary Care Provider Office Phone # Fax #    Abner Moreno [phone] [address] [place] [address]        Equal Access to Services     [facility] ROSA : Onur brumfield Soalexandro, waaxda luqadaha, qaybta kaalsuzette victor . [facility] [phone].    ATENCIÓN: Si lance victor " disposición servicios gratuitos de asistencia lingüística. Gordon guzman [phone].    We comply with applicable federal civil rights laws and [place] laws. We do not discriminate on the basis of race, color, national origin, age, disability, sex, sexual orientation, or gender identity.            Thank you!     Thank you for choosing [facility]  for your care. Our goal is always to provide you with excellent care. Hearing back from our patients is one way we can continue to improve our services. Please take a few minutes to complete the written survey that you may receive in the mail after your visit with us. Thank you!             Your Updated Medication List - Protect others around you: Learn how to safely use, store and throw away your medicines at www.disposemymeds.org.      Notice  As of 2/14/2018  2:37 PM    You have not been prescribed any medications.

## 2018-02-14 NOTE — PROGRESS NOTES
[address] [address]  [phone]  Dept: [phone]    PRE-OP EVALUATION:  Today's date: 2018    Sidney Plasencia (: 1980) presents for pre-operative evaluation assessment as requested by Dr. Wesley Greer.  He requires evaluation and anesthesia risk assessment prior to undergoing surgery/procedure for treatment of low back injury .  Proposed procedure: he is unsure of name- ginetteing L-4    Date of Surgery/ Procedure: 18  Time of Surgery/ Procedure: 7:00 am  Hospital/Surgical Facility: [facility]  Fax number for surgical facility: [phone]  Primary Physician: Abner Moreno  Type of Anesthesia Anticipated: General    Patient has a Health Care Directive or Living Will:  NO    1. NO - Do you have a history of heart attack, stroke, stent, bypass or surgery on an artery in the head, neck, heart or legs?  2. NO - Do you ever have any pain or discomfort in your chest?  3. NO - Do you have a history of  Heart Failure?  4. NO - Are you troubled by shortness of breath when: walking on the level, up a slight hill or at night?  5. NO - Do you currently have a cold, bronchitis or other respiratory infection?  6. NO - Do you have a cough, shortness of breath or wheezing?  7. NO - Do you sometimes get pains in the calves of your legs when you walk?  8. NO - Do you or anyone in your family have previous history of blood clots?  9. NO - Do you or does anyone in your family have a serious bleeding problem such as prolonged bleeding following surgeries or cuts?  10. NO - Have you ever had problems with anemia or been told to take iron pills?  11. NO - Have you had any abnormal blood loss such as black, tarry or bloody stools, or abnormal vaginal bleeding?  12. NO - Have you ever had a blood transfusion?  13. NO - Have you or any of your relatives ever had problems with anesthesia?  14. YES - DO YOU HAVE SLEEP APNEA, EXCESSIVE SNORING OR DAYTIME  DROWSINESS? Excessive snoring- no cpap machine  15. NO - Do you have any prosthetic heart valves?  16. NO - Do you have prosthetic joints?  17. NO - Is there any chance that you may be pregnant?      HPI:                                                      Brief HPI related to upcoming procedure: Patient sustained back injury at work many years ago. He has herniated lumbar discs.       See problem list for active medical problems.  Problems all longstanding and stable, except as noted/documented.  See ROS for pertinent symptoms related to these conditions.                                                                                                  .    MEDICAL HISTORY:                                                      Patient Active Problem List    Diagnosis Date Noted     Pilonidal cyst with abscess likely 03/16/2016     Priority: Medium     Back injury 02/10/2014     Priority: Medium      Past Medical History:   Diagnosis Date     NONSPECIFIC MEDICAL HISTORY 2/2003    lumabr spine film-degen. disc. disease L5-S1     Past Surgical History:   Procedure Laterality Date     C APPENDECTOMY  2000     CYSTECTOMY PILONIDAL N/A 3/31/2017    Procedure: CYSTECTOMY PILONIDAL;  Surgeon: Dexter Mendez MD;  Location: [facility] OR     DISCOGRAPHY N/A 10/26/2017    Procedure: DISCOGRAPHY;  discogram lumbar 2, 3, 4, 5 & S1;  Surgeon: Emmett Galvez MD;  Location: [facility] OR     HC REMOVE TONSILS/ADENOIDS,<13 Y/O  1990    T & A <12y.o.     INJECT EPIDURAL LUMBAR N/A 4/24/2017    Procedure: INJECT EPIDURAL LUMBAR;  lumbar transforaminal epidural steroid injection Lumbar 4-5 and lumbar 5-Sacral 1;  Surgeon: Dexter Tomlinson MD;  Location: [facility] OR     INJECT EPIDURAL TRANSFORAMINAL  3/13/2014    Procedure: INJECT EPIDURAL TRANSFORAMINAL;  transforaminal epidural steroid injection right lumbar 4-5, lumbar 5-sacral1;  Surgeon: Emmett Galvez MD;  Location: [facility] OR     Current Outpatient Prescriptions   Medication Sig Dispense Refill      oxyCODONE-acetaminophen (PERCOCET) 5-325 MG per tablet Take 1 tablet by mouth every 6 hours as needed for pain maximum 4 tablet(s) per day 120 tablet 0     oxyCODONE-acetaminophen (PERCOCET) 5-325 MG per tablet maximum 3 tablet(s) per day 25 tablet 0     OTC products: None, except as noted above    Allergies   Allergen Reactions     No Known Drug Allergies       Latex Allergy: NO    Social History   Substance Use Topics     Smoking status: Current Every Day Smoker     Packs/day: 1.00     Years: 20.00     Types: Cigarettes     Smokeless tobacco: Never Used     Alcohol use No      Comment: rare     History   Drug Use No       REVIEW OF SYSTEMS:                                                    Constitutional, neuro, ENT, endocrine, pulmonary, cardiac, gastrointestinal, genitourinary, musculoskeletal, integument and psychiatric systems are negative, except as otherwise noted.    EXAM:                                                    There were no vitals taken for this visit.    GENERAL APPEARANCE: healthy, alert and no distress     EYES: EOMI,  PERRL     HENT: ear canals and TM's normal and nose and mouth without ulcers or lesions     NECK: no adenopathy, no asymmetry, masses, or scars and thyroid normal to palpation     RESP: lungs clear to auscultation - no rales, rhonchi or wheezes     CV: regular rates and rhythm, normal S1 S2, no S3 or S4 and no murmur, click or rub     ABDOMEN:  soft, nontender, no HSM or masses and bowel sounds normal     MS: extremities normal- no gross deformities noted, no evidence of inflammation in joints, FROM in all extremities.     SKIN: no suspicious lesions or rashes     NEURO: Normal strength and tone, sensory exam grossly normal, mentation intact and speech normal     PSYCH: mentation appears normal. and affect normal/bright     LYMPHATICS: No axillary, cervical, or supraclavicular nodes    DIAGNOSTICS:                                                    Chest XRay given 20  pack year history.    Recent Labs   Lab Test  11/27/12 2039   HGB  15.0   PLT  159     IMPRESSION:                                                    Reason for surgery/procedure: chronic back pain  Diagnosis/reason for consult: pre-operative evaluation.     The proposed surgical procedure is considered INTERMEDIATE risk.    REVISED CARDIAC RISK INDEX  The patient has the following serious cardiovascular risks for perioperative complications such as (MI, PE, VFib and 3  AV Block):  No serious cardiac risks  INTERPRETATION: 0 risks: Class I (very low risk - 0.4% complication rate)    The patient has the following additional risks for perioperative complications:  No identified additional risks      ICD-10-CM    1. Preop general physical exam Z01.818        RECOMMENDATIONS:                                                        APPROVAL GIVEN to proceed with proposed procedure, without further diagnostic evaluation       Signed Electronically by: Abner Moreno MD, MD    Copy of this evaluation report is provided to requesting physician.    [facility] Preop Guidelines

## 2018-03-01 ENCOUNTER — TRANSFERRED RECORDS (OUTPATIENT)
Dept: HEALTH INFORMATION MANAGEMENT | Facility: CLINIC | Age: 38
End: 2018-03-01

## 2018-05-04 ENCOUNTER — HOSPITAL ENCOUNTER (OUTPATIENT)
Dept: PHYSICAL THERAPY | Facility: CLINIC | Age: 38
Setting detail: THERAPIES SERIES
End: 2018-05-04
Attending: ORTHOPAEDIC SURGERY
Payer: OTHER MISCELLANEOUS

## 2018-05-04 PROCEDURE — 40000718 ZZHC STATISTIC PT DEPARTMENT ORTHO VISIT

## 2018-05-04 PROCEDURE — 97032 APPL MODALITY 1+ESTIM EA 15: CPT | Mod: GP

## 2018-05-04 PROCEDURE — 97162 PT EVAL MOD COMPLEX 30 MIN: CPT | Mod: GP

## 2018-05-04 PROCEDURE — 97110 THERAPEUTIC EXERCISES: CPT | Mod: GP

## 2018-05-07 ENCOUNTER — HOSPITAL ENCOUNTER (OUTPATIENT)
Dept: PHYSICAL THERAPY | Facility: CLINIC | Age: 38
Setting detail: THERAPIES SERIES
End: 2018-05-07
Attending: ORTHOPAEDIC SURGERY
Payer: OTHER MISCELLANEOUS

## 2018-05-07 PROCEDURE — 97014 ELECTRIC STIMULATION THERAPY: CPT | Mod: GP | Performed by: PHYSICAL THERAPIST

## 2018-05-07 PROCEDURE — 97035 APP MDLTY 1+ULTRASOUND EA 15: CPT | Mod: GP | Performed by: PHYSICAL THERAPIST

## 2018-05-07 PROCEDURE — 97112 NEUROMUSCULAR REEDUCATION: CPT | Mod: GP | Performed by: PHYSICAL THERAPIST

## 2018-05-07 PROCEDURE — 40000718 ZZHC STATISTIC PT DEPARTMENT ORTHO VISIT: Performed by: PHYSICAL THERAPIST

## 2018-05-08 NOTE — PROGRESS NOTES
05/04/18 1627   General Information   Type of Visit Initial OP Ortho PT Evaluation   Start of Care Date 05/04/18   Referring Physician Dr. Wesley Quesada   Patient/Family Goals Statement pain managment strength   Orders Evaluate and Treat   Date of Order 05/02/18   Insurance Type (work comp)   Medical Diagnosis S/P lumbar fusion, pain   Surgical/Medical history reviewed Yes   Precautions/Limitations (lifting precautions)   Body Part(s)   Body Part(s) Lumbar Spine/SI   Presentation and Etiology   Pertinent history of current problem (include personal factors and/or comorbidities that impact the POC) anterior approach lumbar fusion and disc replacements.   Impairments A. Pain;E. Decreased flexibility   Functional Limitations perform activities of daily living;perform required work activities;perform desired leisure / sports activities   Symptom Location low back    Onset date of current episode/exacerbation 03/01/18   Chronicity New   Pain rating (0-10 point scale) Best (/10);Worst (/10)   Best (/10) 3-4/10 best.    Worst (/10) 8/10   Pain quality B. Dull;F. Stabbing   Frequency of pain/symptoms A. Constant   Pain/symptoms are: Worse in the morning   Pain/symptoms exacerbated by B. Walking;K. Home tasks;L. Work tasks   Pain/symptoms eased by D. Nothing   Progression of symptoms since onset: Improved   Prior Level of Function   Prior Level of Function-Mobility indep   Prior Level of Function-ADLs indep   Current Level of Function   Patient role/employment history A. Employed   Employment Comments [employer]   Home/community accessibility driving  is okay but sitting duration is difficult. stairs jar the low back   Fall Risk Screen   Fall screen completed by PT   Have you fallen 2 or more times in the past year? No   Have you fallen and had an injury in the past year? No   System Outcome Measures   Outcome Measures Low Back Pain (see Oswestry and Darrian)   Lumbar Spine/SI Objective Findings   Observation  discomfort with sitting or standing   Posture needs to hold neutral right now. He has pain with extension due to having surgical site so flared up    Flexion ROM nil   Extension ROM nil   Right Side Bending ROM nil   Left Side Bending ROM nil    Lumbar ROM Comment spine is so flared up from slurgery and return to work he has significant pain.    Transversus Abdominus Strength (Malachi Leg Lowering-deg) 2+ due to surgery   Hip Abduction Strength 3+   Knee Flexion Strength 4   Knee Extension (L3) Strength 4   Ankle Dorsiflexion (L4) Strength 4   Great Toe Extension (L5) Strength 4   Ankle Plantar Flexion (S1) Strength 4   Hamstring Flexibility limited related to surgery   Palpation paravert muscles not reactive .    Planned Therapy Interventions   Planned Therapy Interventions neuromuscular re-education   Planned Modality Interventions   Planned Modality Interventions Ultrasound;Electrical stimulation   Clinical Impression   Criteria for Skilled Therapeutic Interventions Met yes, treatment indicated   PT Diagnosis pain abdominal area and low back   Influenced by the following impairments gait dysfunction   Functional limitations due to impairments sitting, walking and stairs are painfull due to weakness    Clinical Presentation Stable/Uncomplicated   Clinical Presentation Rationale stable with surgical repair but soft tissue is reactive and painful   Clinical Decision Making (Complexity) Low complexity   Therapy Frequency 2 times/Week  ( adjust to 1x/week as needed)   Predicted Duration of Therapy Intervention (days/wks) 60   Risk & Benefits of therapy have been explained Yes   Patient, Family & other staff in agreement with plan of care Yes   Education Assessment   Preferred Learning Style Listening;Demonstration;Pictures/video   Barriers to Learning No barriers   ORTHO GOALS   PT Ortho Eval Goals 1;2   Ortho Goal 1   Goal Identifier 1   Goal Description Pt will report 50 to 75% reduction of pain in order for him to  tolerate sitting and walking for job tasks   Target Date 07/02/18   Ortho Goal 2   Goal Identifier 2   Goal Description Pt will have strength of core muscles to 4/5 to help manage pain and promote healing .   Target Date 07/02/18   Total Evaluation Time   Total Evaluation Time 30

## 2018-05-08 NOTE — ADDENDUM NOTE
Encounter addended by: Chaya Matias, PT on: 5/8/2018  8:59 AM<BR>     Actions taken: Sign clinical note, Flowsheet accepted

## 2018-05-11 ENCOUNTER — HOSPITAL ENCOUNTER (OUTPATIENT)
Dept: PHYSICAL THERAPY | Facility: CLINIC | Age: 38
Setting detail: THERAPIES SERIES
End: 2018-05-11
Attending: ORTHOPAEDIC SURGERY
Payer: OTHER MISCELLANEOUS

## 2018-05-11 PROCEDURE — 97014 ELECTRIC STIMULATION THERAPY: CPT | Mod: GP | Performed by: PHYSICAL THERAPIST

## 2018-05-11 PROCEDURE — 40000718 ZZHC STATISTIC PT DEPARTMENT ORTHO VISIT: Performed by: PHYSICAL THERAPIST

## 2018-05-11 PROCEDURE — 97035 APP MDLTY 1+ULTRASOUND EA 15: CPT | Mod: GP | Performed by: PHYSICAL THERAPIST

## 2018-05-15 ENCOUNTER — HOSPITAL ENCOUNTER (OUTPATIENT)
Dept: PHYSICAL THERAPY | Facility: CLINIC | Age: 38
Setting detail: THERAPIES SERIES
End: 2018-05-15
Attending: ORTHOPAEDIC SURGERY
Payer: OTHER MISCELLANEOUS

## 2018-05-15 PROCEDURE — 40000718 ZZHC STATISTIC PT DEPARTMENT ORTHO VISIT

## 2018-05-15 PROCEDURE — 97032 APPL MODALITY 1+ESTIM EA 15: CPT | Mod: GP

## 2018-05-15 PROCEDURE — 97110 THERAPEUTIC EXERCISES: CPT | Mod: GP

## 2018-05-29 ENCOUNTER — HOSPITAL ENCOUNTER (OUTPATIENT)
Dept: PHYSICAL THERAPY | Facility: CLINIC | Age: 38
Setting detail: THERAPIES SERIES
End: 2018-05-29
Attending: ORTHOPAEDIC SURGERY
Payer: OTHER MISCELLANEOUS

## 2018-05-29 PROCEDURE — 40000718 ZZHC STATISTIC PT DEPARTMENT ORTHO VISIT

## 2018-05-29 PROCEDURE — 97140 MANUAL THERAPY 1/> REGIONS: CPT | Mod: GP

## 2018-05-29 PROCEDURE — 97035 APP MDLTY 1+ULTRASOUND EA 15: CPT | Mod: GP

## 2018-05-29 PROCEDURE — 97110 THERAPEUTIC EXERCISES: CPT | Mod: GP

## 2018-06-01 ENCOUNTER — HOSPITAL ENCOUNTER (OUTPATIENT)
Dept: PHYSICAL THERAPY | Facility: CLINIC | Age: 38
Setting detail: THERAPIES SERIES
End: 2018-06-01
Attending: ORTHOPAEDIC SURGERY
Payer: OTHER MISCELLANEOUS

## 2018-06-01 PROCEDURE — 97110 THERAPEUTIC EXERCISES: CPT | Mod: GP | Performed by: PHYSICAL THERAPIST

## 2018-06-01 PROCEDURE — 97014 ELECTRIC STIMULATION THERAPY: CPT | Mod: GP | Performed by: PHYSICAL THERAPIST

## 2018-06-01 PROCEDURE — 40000718 ZZHC STATISTIC PT DEPARTMENT ORTHO VISIT: Performed by: PHYSICAL THERAPIST

## 2018-06-01 PROCEDURE — 97140 MANUAL THERAPY 1/> REGIONS: CPT | Mod: GP | Performed by: PHYSICAL THERAPIST

## 2018-06-13 ENCOUNTER — HOSPITAL ENCOUNTER (OUTPATIENT)
Dept: PHYSICAL THERAPY | Facility: CLINIC | Age: 38
Setting detail: THERAPIES SERIES
End: 2018-06-13
Attending: ORTHOPAEDIC SURGERY
Payer: OTHER MISCELLANEOUS

## 2018-06-13 DIAGNOSIS — S39.92XD INJURY OF BACK, SUBSEQUENT ENCOUNTER: Primary | ICD-10-CM

## 2018-06-13 PROCEDURE — 97110 THERAPEUTIC EXERCISES: CPT | Mod: GP

## 2018-06-13 PROCEDURE — 97112 NEUROMUSCULAR REEDUCATION: CPT | Mod: GP

## 2018-06-13 PROCEDURE — 40000718 ZZHC STATISTIC PT DEPARTMENT ORTHO VISIT

## 2018-06-13 RX ORDER — OXYCODONE HYDROCHLORIDE 10 MG/1
10 TABLET ORAL EVERY 4 HOURS PRN
Qty: 40 TABLET | Refills: 0 | Status: SHIPPED | OUTPATIENT
Start: 2018-06-13 | End: 2019-03-09

## 2018-06-15 ENCOUNTER — HOSPITAL ENCOUNTER (OUTPATIENT)
Dept: PHYSICAL THERAPY | Facility: CLINIC | Age: 38
Setting detail: THERAPIES SERIES
End: 2018-06-15
Attending: ORTHOPAEDIC SURGERY
Payer: OTHER MISCELLANEOUS

## 2018-06-15 PROCEDURE — 40000718 ZZHC STATISTIC PT DEPARTMENT ORTHO VISIT: Performed by: PHYSICAL THERAPIST

## 2018-06-15 PROCEDURE — 97110 THERAPEUTIC EXERCISES: CPT | Mod: GP | Performed by: PHYSICAL THERAPIST

## 2018-06-17 NOTE — PROGRESS NOTES
Outpatient Physical Therapy Progress Note     Patient: Sidney Plasencia  : 1980    Beginning/End Dates of Reporting Period:  8 visits between 18 and 6-15-18    Referring Provider: Dr. Wesley Quesada    Therapy Diagnosis: pain abdominal area and low back     Client Self Report: He is not noting any changes in his pain level. It is staying centered along the lumbar to lower thoracic spine. He reports he is moving better.     Objective Measurements:  Objective Measure: Symptoms  Details: 5-6/10  He had increased hip abductor strength (4-/5)    Abdominal strength still decreased with double leg lifts - 4-/5.    Strength overall improved.      Outcome Measures (most recent score):  Darrian STarT Sub-Score (Q5-9): 2  Darrian STarT Total Score (all 9): 3  Oswestry Score (%): 42 %    Goals:  Goal Identifier 1   Goal Description Pt will report 50 to 75% reduction of pain in order for him to tolerate sitting and walking for job tasks (Same pain wise)   Target Date 18   Date Met      Progress:     Goal Identifier 2   Goal Description Pt will have strength of core muscles to 4/5 to help manage pain and promote healing .   Target Date 18   Date Met      Progress:       Progress Toward Goals:   Progress this reporting period: Sidney continues to have centered symptoms. He is not noting much change as far as pain level. This is reflected in LOLITA and Darrian which we discussed. He did feel the pelvic correction for posterior L innominant did help a little. He has poor abdominal strength. I would recommend he be advanced into an intense core strengthening activity.           Plan:  Changes to therapy plan of care: continue 2 times per week for core strengthening exercises and conditioning for 4 more visits (extended authorization). He may need another 4-6 visits.     Discharge:  No    Thank you for referring Sidney  to [facility] - Danita Jauregui, PT  [phone]

## 2018-06-21 ENCOUNTER — HOSPITAL ENCOUNTER (OUTPATIENT)
Dept: PHYSICAL THERAPY | Facility: CLINIC | Age: 38
Setting detail: THERAPIES SERIES
End: 2018-06-21
Attending: ORTHOPAEDIC SURGERY
Payer: OTHER MISCELLANEOUS

## 2018-06-21 PROCEDURE — 97032 APPL MODALITY 1+ESTIM EA 15: CPT | Mod: GP

## 2018-06-21 PROCEDURE — 40000718 ZZHC STATISTIC PT DEPARTMENT ORTHO VISIT

## 2018-06-21 PROCEDURE — 97110 THERAPEUTIC EXERCISES: CPT | Mod: GP

## 2018-06-22 ENCOUNTER — HOSPITAL ENCOUNTER (OUTPATIENT)
Dept: PHYSICAL THERAPY | Facility: CLINIC | Age: 38
Setting detail: THERAPIES SERIES
End: 2018-06-22
Attending: ORTHOPAEDIC SURGERY
Payer: OTHER MISCELLANEOUS

## 2018-06-22 PROCEDURE — 97032 APPL MODALITY 1+ESTIM EA 15: CPT | Mod: GP

## 2018-06-22 PROCEDURE — 40000718 ZZHC STATISTIC PT DEPARTMENT ORTHO VISIT

## 2018-06-26 NOTE — ADDENDUM NOTE
Encounter addended by: Chaya Matias, PT on: 6/26/2018 11:22 AM<BR>     Actions taken: Flowsheet data copied forward, Flowsheet accepted

## 2018-08-03 ENCOUNTER — HOSPITAL ENCOUNTER (OUTPATIENT)
Dept: GENERAL RADIOLOGY | Facility: CLINIC | Age: 38
Discharge: HOME | End: 2018-08-03
Attending: ORTHOPAEDIC SURGERY | Admitting: ORTHOPAEDIC SURGERY
Payer: OTHER MISCELLANEOUS

## 2018-08-03 DIAGNOSIS — M51.26 DISPLACEMENT OF LUMBAR INTERVERTEBRAL DISC WITHOUT MYELOPATHY: ICD-10-CM

## 2018-08-03 DIAGNOSIS — M43.26 FUSION OF LUMBAR SPINE: ICD-10-CM

## 2018-08-03 PROCEDURE — 72100 X-RAY EXAM L-S SPINE 2/3 VWS: CPT | Mod: TC

## 2018-10-09 NOTE — ADDENDUM NOTE
Encounter addended by: Chaya Matias, PT on: 10/9/2018  5:55 PM<BR>     Actions taken: Episode resolved

## 2018-10-09 NOTE — ADDENDUM NOTE
Encounter addended by: Chaya Matias, PT on: 10/9/2018  5:53 PM<BR>     Actions taken: Sign clinical note

## 2018-10-09 NOTE — PROGRESS NOTES
Outpatient Physical Therapy Discharge Note     Patient: Sidney Plasencia  : 1980    Beginning/End Dates of Reporting Period:  2018 to 2018    Referring Provider: Dr. Wesley Greer    Therapy Diagnosis: low back pain     Client Self Report: Pt reports haveing less pain today so feels better.    Outcome Measures (most recent score):  Darrian STarT   medium  Darrian STarT  / low       Goals:  Goal Identifier 1   Goal Description Pt will report 50 to 75% reduction of pain in order for him to tolerate sitting and walking for job tasks   Target Date 18   Date Met      Progress: goal met     Goal Identifier 2   Goal Description Pt will have strength of core muscles to 4/5 to help manage pain and promote healing .   Target Date 18   Date Met      Progress:he is indep with home program     Plan:  Discharge from therapy.    Discharge:    Reason for Discharge: Patient has met all goals.    Equipment Issued: none    Discharge Plan: Patient to continue home program.

## 2018-11-19 NOTE — PROGRESS NOTES
[address]  [facility] [address] [phone]  Dept: [phone]    PRE-OP EVALUATION:  Today's date: 2018    Sidney Plasencia (: 1980) presents for pre-operative evaluation assessment as requested by Dr. Greer.  He requires evaluation and anesthesia risk assessment prior to undergoing surgery/procedure for treatment for lumbar nerves (destruction)     Fax number for surgical facility:   [facility]  [phone] ( fax)  Primary Physician: Abner Moreno  Type of Anesthesia Anticipated: General      Patient has a Health Care Directive or Living Will:  NO    Preop Questions 2018   Who is doing your surgery? Dr. Greer    What are you having done? Lumbar nerve destruction.    Date of Surgery/Procedure:    Facility or Hospital where procedure/surgery will be performed: [facility]    1.  Do you have a history of Heart attack, stroke, stent, coronary bypass surgery, or other heart surgery? No   2.  Do you ever have any pain or discomfort in your chest? No   3.  Do you have a history of  Heart Failure? No   4.   Are you troubled by shortness of breath when:  walking on a level surface, or up a slight hill, or at night? No   5.  Do you currently have a cold, bronchitis or other respiratory infection? No   6.  Do you have a cough, shortness of breath, or wheezing? No   7.  Do you sometimes get pains in the calves of your legs when you walk? No   8. Do you or anyone in your family have previous history of blood clots? No   9.  Do you or does anyone in your family have a serious bleeding problem such as prolonged bleeding following surgeries or cuts? No   10. Have you ever had problems with anemia or been told to take iron pills? No   11. Have you had any abnormal blood loss such as black, tarry or bloody stools? No   12. Have you ever had a blood transfusion? No   13. Have you or any of your relatives ever had  "problems with anesthesia? No   14. Do you have sleep apnea, excessive snoring or daytime drowsiness? No   15. Do you have any prosthetic heart valves? No   16. Do you have prosthetic joints? YES - lumbar fusion and discography          HPI:     HPI related to upcoming procedure:    Patient sustained back injury at work many years ago. Two artificals in the lumbar spine L4-L5 SI, L3-4 fused. Continues to have pain, having laparoscopic surgery to \"destroy the nerve endings\"      See problem list for active medical problems.  Problems all longstanding and stable, except as noted/documented.  See ROS for pertinent symptoms related to these conditions.                                                                                                                                                          .    MEDICAL HISTORY:     Patient Active Problem List    Diagnosis Date Noted     Morbid obesity (H) 11/26/2018     Priority: Medium     Spondylosis of lumbar region without myelopathy or radiculopathy 03/01/2018     Priority: Medium     Pilonidal cyst with abscess likely 03/16/2016     Priority: Medium     Back injury 02/10/2014     Priority: Medium      Past Medical History:   Diagnosis Date     NONSPECIFIC MEDICAL HISTORY 2/2003    lumabr spine film-degen. disc. disease L5-S1     Past Surgical History:   Procedure Laterality Date     C APPENDECTOMY  2000     CYSTECTOMY PILONIDAL N/A 3/31/2017    Procedure: CYSTECTOMY PILONIDAL;  Surgeon: Dexter Mendez MD;  Location: [facility] OR     DISCOGRAPHY N/A 10/26/2017    Procedure: DISCOGRAPHY;  discogram lumbar 2, 3, 4, 5 & S1;  Surgeon: Emmett Galvez MD;  Location: [facility] OR     HC REMOVE TONSILS/ADENOIDS,<11 Y/O  1990    T & A <12y.o.     INJECT EPIDURAL LUMBAR N/A 4/24/2017    Procedure: INJECT EPIDURAL LUMBAR;  lumbar transforaminal epidural steroid injection Lumbar 4-5 and lumbar 5-Sacral 1;  Surgeon: Dexter Tomlinson MD;  Location: [facility] OR     INJECT EPIDURAL " "TRANSFORAMINAL  3/13/2014    Procedure: INJECT EPIDURAL TRANSFORAMINAL;  transforaminal epidural steroid injection right lumbar 4-5, lumbar 5-sacral1;  Surgeon: Emmett Galvez MD;  Location:  OR     Current Outpatient Prescriptions   Medication Sig Dispense Refill     oxyCODONE IR (ROXICODONE) 10 MG tablet Take 1 tablet (10 mg) by mouth every 4 hours as needed for moderate to severe pain 40 tablet 0     acetaminophen (TYLENOL) 325 MG tablet Take 325-650 mg by mouth every 4 hours as needed       OTC products: no recent use of OTC ASA, NSAIDS or Steroids    Allergies   Allergen Reactions     No Known Drug Allergies       Latex Allergy: NO    Social History   Substance Use Topics     Smoking status: Current Every Day Smoker     Packs/day: 1.00     Years: 20.00     Types: Cigarettes     Smokeless tobacco: Never Used     Alcohol use No      Comment: rare     History   Drug Use No         REVIEW OF SYSTEMS:   CONSTITUTIONAL: NEGATIVE for fever, chills, change in weight  INTEGUMENTARY/SKIN: NEGATIVE for worrisome rashes, moles or lesions  EYES: NEGATIVE for vision changes or irritation  ENT/MOUTH: NEGATIVE for ear, mouth and throat problems  RESP: NEGATIVE for significant cough or SOB  CV: NEGATIVE for chest pain, palpitations or peripheral edema  GI: NEGATIVE for nausea, abdominal pain, heartburn, or change in bowel habits  : NEGATIVE for frequency, dysuria, or hematuria  MUSCULOSKELETAL: Positive low back pain.   NEURO: NEGATIVE for weakness, dizziness or paresthesias  ENDOCRINE: NEGATIVE for temperature intolerance, skin/hair changes  HEME: NEGATIVE for bleeding problems  PSYCHIATRIC: NEGATIVE for changes in mood or affect    EXAM:   /74  Pulse 85  Temp 98.1  F (36.7  C)  Ht 6' 4\" (1.93 m)  Wt (!) 349 lb (158.3 kg)  SpO2 95%  BMI 42.48 kg/m2    GENERAL APPEARANCE: healthy, alert and no distress     EYES: EOMI,  PERRL     HENT: ear canals and TM's normal and nose and mouth without ulcers or lesions     " NECK: no adenopathy, no asymmetry, masses, or scars and thyroid normal to palpation     RESP: lungs clear to auscultation - no rales, rhonchi or wheezes     CV: regular rates and rhythm, normal S1 S2, no S3 or S4 and no murmur, click or rub     ABDOMEN:  soft, nontender, no HSM or masses and bowel sounds normal     MS: low back pain, decreased ROM.      SKIN: no suspicious lesions or rashes     NEURO: Normal strength and tone, sensory exam grossly normal, mentation intact and speech normal     PSYCH: mentation appears normal. and affect normal/bright     LYMPHATICS: No cervical adenopathy    DIAGNOSTICS:     EKG: appears normal, NSR, normal axis, normal intervals, no acute ST/T changes c/w ischemia, no LVH by voltage criteria, unchanged from previous tracings  Labs Resulted Today:   Results for orders placed or performed in visit on 11/26/18   CBC with platelets and differential   Result Value Ref [facility]    WBC 12.8 (H) 4.0 - 11.0 10e9/L    RBC Count 5.08 4.4 - 5.9 10e12/L    Hemoglobin 14.9 13.3 - 17.7 g/dL    Hematocrit 44.2 40.0 - 53.0 %    MCV 87 78 - 100 fl    MCH 29.3 26.5 - 33.0 pg    MCHC 33.7 31.5 - 36.5 g/dL    RDW 13.3 10.0 - 15.0 %    Platelet Count 235 150 - 450 10e9/L    % Neutrophils 58.6 %    % Lymphocytes 32.1 %    % Monocytes 8.3 %    % Eosinophils 0.8 %    % Basophils 0.2 %    Absolute Neutrophil 7.5 1.6 - 8.3 10e9/L    Absolute Lymphocytes 4.1 0.8 - 5.3 10e9/L    Absolute Monocytes 1.1 0.0 - 1.3 10e9/L    Absolute Eosinophils 0.1 0.0 - 0.7 10e9/L    Absolute Basophils 0.0 0.0 - 0.2 10e9/L    Diff Method Automated Method    Basic metabolic panel  (Ca, Cl, CO2, Creat, Gluc, K, Na, BUN)   Result Value Ref [facility]    Sodium 138 133 - 144 mmol/L    Potassium 4.1 3.4 - 5.3 mmol/L    Chloride 105 94 - 109 mmol/L    Carbon Dioxide 26 20 - 32 mmol/L    Anion Gap 7 3 - 14 mmol/L    Glucose 86 70 - 99 mg/dL    Urea Nitrogen 9 7 - 30 mg/dL    Creatinine 0.84 0.66 - 1.25 mg/dL    GFR Estimate >90 >60  mL/min/1.7m2    GFR Estimate If Black >90 >60 mL/min/1.7m2    Calcium 9.0 8.5 - 10.1 mg/dL   *UA reflex to Microscopic and Culture ([facility] (except [place] and [place])   Result Value Ref [facility]    Color Urine Yellow     Appearance Urine Clear     Glucose Urine Negative NEG^Negative mg/dL    Bilirubin Urine Negative NEG^Negative    Ketones Urine Negative NEG^Negative mg/dL    Specific Gravity Urine >1.030 1.003 - 1.035    Blood Urine Negative NEG^Negative    pH Urine 6.0 5.0 - 7.0 pH    Protein Albumin Urine Negative NEG^Negative mg/dL    Urobilinogen Urine 0.2 0.2 - 1.0 EU/dL    Nitrite Urine Negative NEG^Negative    Leukocyte Esterase Urine Negative NEG^Negative    Source Unspecified Urine      CHEST TWO VIEWS November 26, 2018 6:39 PM      HISTORY: Leukocytosis, unspecified type.     COMPARISON: Chest x-rays dated 2/14/2018.     FINDINGS: There is mild hypoaeration due to body habitus. Slightly  increased interstitial markings likely represent mild vascular  crowding versus less likely mild vascular congestion. Heart size and  pulmonary vascularity are otherwise unremarkable. Mild perihilar  peribronchial cuffing could be secondary to mild vascular congestion  versus bronchitis.          IMPRESSION:   1. Mild hypoaeration could be due to body habitus.  2. Mild perihilar peribronchial cuffing could represent bronchitis.  3. No evidence for pneumonia. No definite evidence for congestive  heart failure. No obvious lymphadenopathy is seen.  IMPRESSION:   Reason for surgery/procedure:   Diagnosis/reason for consult: Pre-operative evaluation.     The proposed surgical procedure is considered INTERMEDIATE risk.    REVISED CARDIAC RISK INDEX  The patient has the following serious cardiovascular risks for perioperative complications such as (MI, PE, VFib and 3  AV Block):  No serious cardiac risks  INTERPRETATION: 0 risks: Class I (very low risk - 0.4% complication rate)    The patient has the  following additional risks for perioperative complications:  No identified additional risks  Morbid obesity      ICD-10-CM    1. Preop general physical exam Z01.818 CBC with platelets and differential     Basic metabolic panel  (Ca, Cl, CO2, Creat, Gluc, K, Na, BUN)     CBC with platelets and differential     Basic metabolic panel  (Ca, Cl, CO2, Creat, Gluc, K, Na, BUN)     EKG 12-lead complete w/read - Clinics     *UA reflex to Microscopic and Culture ([facility] (except [place] and [place])   2. Spondylosis of lumbar region without myelopathy or radiculopathy M47.816    3. Leukocytosis, unspecified type D72.829 *UA reflex to Microscopic and Culture ([facility] (except [place] and [place])     XR Chest 2 Views   4. Morbid obesity (H) E66.01    5. Need for prophylactic vaccination and inoculation against influenza Z23 FLU VACCINE, SPLIT VIRUS, IM (QUADRIVALENT) [29710]- >3 YRS     Vaccine Administration, Initial [33761]       RECOMMENDATIONS:       --Patient is to take all scheduled medications on the day of surgery EXCEPT for modifications listed below.    - Patient has leukocytosis on CBC today, please recheck prior to surgery to ensure not rising. Will recheck in clinic in 1 week. Ruled out UTI and chest xray negative. Could be falsely elevated. No neutrophil elevation. Declines any fevers and chills, no signs and symptoms of infection. He did receive the flu shot today in addition to his visit.     APPROVAL GIVEN to proceed with proposed procedure, without further diagnostic evaluation       Signed Electronically by: RADHA Brown CNP    Copy of this evaluation report is provided to requesting physician.    [facility] Preop Guidelines    Revised Cardiac Risk Index

## 2018-11-21 PROBLEM — M47.816 SPONDYLOSIS OF LUMBAR REGION WITHOUT MYELOPATHY OR RADICULOPATHY: Status: ACTIVE | Noted: 2018-03-01

## 2018-11-21 RX ORDER — ACETAMINOPHEN 325 MG/1
325-650 TABLET ORAL EVERY 4 HOURS PRN
COMMUNITY
End: 2019-03-09

## 2018-11-21 RX ORDER — DIAZEPAM 5 MG
1 TABLET ORAL PRN
COMMUNITY
Start: 2018-03-02 | End: 2018-11-26

## 2018-11-26 ENCOUNTER — APPOINTMENT (OUTPATIENT)
Dept: FAMILY MEDICINE | Facility: OTHER | Age: 38
End: 2018-11-26
Payer: COMMERCIAL

## 2018-11-26 ENCOUNTER — RADIANT APPOINTMENT (OUTPATIENT)
Dept: GENERAL RADIOLOGY | Facility: OTHER | Age: 38
End: 2018-11-26
Attending: NURSE PRACTITIONER
Payer: COMMERCIAL

## 2018-11-26 ENCOUNTER — OFFICE VISIT (OUTPATIENT)
Dept: FAMILY MEDICINE | Facility: OTHER | Age: 38
End: 2018-11-26
Payer: OTHER MISCELLANEOUS

## 2018-11-26 VITALS
OXYGEN SATURATION: 95 % | TEMPERATURE: 98.1 F | DIASTOLIC BLOOD PRESSURE: 74 MMHG | HEIGHT: 76 IN | BODY MASS INDEX: 38.36 KG/M2 | SYSTOLIC BLOOD PRESSURE: 136 MMHG | HEART RATE: 85 BPM | WEIGHT: 315 LBS

## 2018-11-26 DIAGNOSIS — Z23 NEED FOR PROPHYLACTIC VACCINATION AND INOCULATION AGAINST INFLUENZA: ICD-10-CM

## 2018-11-26 DIAGNOSIS — D72.829 LEUKOCYTOSIS, UNSPECIFIED TYPE: ICD-10-CM

## 2018-11-26 DIAGNOSIS — Z01.818 PREOP GENERAL PHYSICAL EXAM: Primary | ICD-10-CM

## 2018-11-26 DIAGNOSIS — E66.01 MORBID OBESITY (H): ICD-10-CM

## 2018-11-26 DIAGNOSIS — M47.816 SPONDYLOSIS OF LUMBAR REGION WITHOUT MYELOPATHY OR RADICULOPATHY: ICD-10-CM

## 2018-11-26 LAB
ALBUMIN UR-MCNC: NEGATIVE MG/DL
ANION GAP SERPL CALCULATED.3IONS-SCNC: 7 MMOL/L (ref 3–14)
APPEARANCE UR: CLEAR
BASOPHILS # BLD AUTO: 0 10E9/L (ref 0–0.2)
BASOPHILS NFR BLD AUTO: 0.2 %
BILIRUB UR QL STRIP: NEGATIVE
BUN SERPL-MCNC: 9 MG/DL (ref 7–30)
CALCIUM SERPL-MCNC: 9 MG/DL (ref 8.5–10.1)
CHLORIDE SERPL-SCNC: 105 MMOL/L (ref 94–109)
CO2 SERPL-SCNC: 26 MMOL/L (ref 20–32)
COLOR UR AUTO: YELLOW
CREAT SERPL-MCNC: 0.84 MG/DL (ref 0.66–1.25)
DIFFERENTIAL METHOD BLD: ABNORMAL
EOSINOPHIL # BLD AUTO: 0.1 10E9/L (ref 0–0.7)
EOSINOPHIL NFR BLD AUTO: 0.8 %
ERYTHROCYTE [DISTWIDTH] IN BLOOD BY AUTOMATED COUNT: 13.3 % (ref 10–15)
GFR SERPL CREATININE-BSD FRML MDRD: >90 ML/MIN/1.7M2
GLUCOSE SERPL-MCNC: 86 MG/DL (ref 70–99)
GLUCOSE UR STRIP-MCNC: NEGATIVE MG/DL
HCT VFR BLD AUTO: 44.2 % (ref 40–53)
HGB BLD-MCNC: 14.9 G/DL (ref 13.3–17.7)
HGB UR QL STRIP: NEGATIVE
KETONES UR STRIP-MCNC: NEGATIVE MG/DL
LEUKOCYTE ESTERASE UR QL STRIP: NEGATIVE
LYMPHOCYTES # BLD AUTO: 4.1 10E9/L (ref 0.8–5.3)
LYMPHOCYTES NFR BLD AUTO: 32.1 %
MCH RBC QN AUTO: 29.3 PG (ref 26.5–33)
MCHC RBC AUTO-ENTMCNC: 33.7 G/DL (ref 31.5–36.5)
MCV RBC AUTO: 87 FL (ref 78–100)
MONOCYTES # BLD AUTO: 1.1 10E9/L (ref 0–1.3)
MONOCYTES NFR BLD AUTO: 8.3 %
NEUTROPHILS # BLD AUTO: 7.5 10E9/L (ref 1.6–8.3)
NEUTROPHILS NFR BLD AUTO: 58.6 %
NITRATE UR QL: NEGATIVE
PH UR STRIP: 6 PH (ref 5–7)
PLATELET # BLD AUTO: 235 10E9/L (ref 150–450)
POTASSIUM SERPL-SCNC: 4.1 MMOL/L (ref 3.4–5.3)
RBC # BLD AUTO: 5.08 10E12/L (ref 4.4–5.9)
SODIUM SERPL-SCNC: 138 MMOL/L (ref 133–144)
SOURCE: NORMAL
SP GR UR STRIP: >1.03 (ref 1–1.03)
UROBILINOGEN UR STRIP-ACNC: 0.2 EU/DL (ref 0.2–1)
WBC # BLD AUTO: 12.8 10E9/L (ref 4–11)

## 2018-11-26 PROCEDURE — 36415 COLL VENOUS BLD VENIPUNCTURE: CPT | Performed by: NURSE PRACTITIONER

## 2018-11-26 PROCEDURE — 99214 OFFICE O/P EST MOD 30 MIN: CPT | Mod: 25 | Performed by: NURSE PRACTITIONER

## 2018-11-26 PROCEDURE — 80048 BASIC METABOLIC PNL TOTAL CA: CPT | Performed by: NURSE PRACTITIONER

## 2018-11-26 PROCEDURE — 81003 URINALYSIS AUTO W/O SCOPE: CPT | Performed by: NURSE PRACTITIONER

## 2018-11-26 PROCEDURE — 90686 IIV4 VACC NO PRSV 0.5 ML IM: CPT | Performed by: NURSE PRACTITIONER

## 2018-11-26 PROCEDURE — 90471 IMMUNIZATION ADMIN: CPT | Performed by: NURSE PRACTITIONER

## 2018-11-26 PROCEDURE — 93000 ELECTROCARDIOGRAM COMPLETE: CPT | Performed by: NURSE PRACTITIONER

## 2018-11-26 PROCEDURE — 85025 COMPLETE CBC W/AUTO DIFF WBC: CPT | Performed by: NURSE PRACTITIONER

## 2018-11-26 PROCEDURE — 71046 X-RAY EXAM CHEST 2 VIEWS: CPT

## 2018-11-26 ASSESSMENT — PAIN SCALES - GENERAL: PAINLEVEL: SEVERE PAIN (6)

## 2018-11-26 NOTE — MR AVS SNAPSHOT
After Visit Summary   11/26/2018    Sidney Plasencia    MRN: [id number]           Patient Information     Date Of Birth          1980        Visit Information        Provider Department      11/26/2018 6:00 PM Denice Cherry APRN CNP [facility]        Today's Diagnoses     Preop general physical exam    -  1    Spondylosis of lumbar region without myelopathy or radiculopathy        Leukocytosis, unspecified type        Morbid obesity (H)          Care Instructions      Before Your Surgery      Call your surgeon if there is any change in your health. This includes signs of a cold or flu (such as a sore throat, runny nose, cough, rash or fever).    Do not smoke, drink alcohol or take over the counter medicine (unless your surgeon or primary care doctor tells you to) for the 24 hours before and after surgery.    If you take prescribed drugs: Follow your doctor s orders about which medicines to take and which to stop until after surgery.    Eating and drinking prior to surgery: follow the instructions from your surgeon    Take a shower or bath the night before surgery. Use the soap your surgeon gave you to gently clean your skin. If you do not have soap from your surgeon, use your regular soap. Do not shave or scrub the surgery site.  Wear clean pajamas and have clean sheets on your bed.           Follow-ups after your visit        Future tests that were ordered for you today     Open Future Orders        Priority Expected Expires Ordered    XR Chest 2 Views Routine 11/26/2018 11/26/2019 11/26/2018            Who to contact     If you have questions or need follow up information about today's clinic visit or your schedule please contact [facility] directly at [phone].  Normal or non-critical lab and imaging results will be communicated to you by MyChart, letter or phone within 4 business days after the clinic has received the results. If you do not hear from  "us within 7 days, please contact the clinic through [employer] or phone. If you have a critical or abnormal lab result, we will notify you by phone as soon as possible.  Submit refill requests through [email or web address] or call your pharmacy and they will forward the refill request to us. Please allow 3 business days for your refill to be completed.          Additional Information About Your Visit        Care EveryWhere ID     This is your Care EveryWhere ID. This could be used by other organizations to access your [facility] medical records  [id number]        Your Vitals Were     Pulse Temperature Height Pulse Oximetry BMI (Body Mass Index)       85 98.1  F (36.7  C) 6' 4\" (1.93 m) 95% 42.48 kg/m2        Blood Pressure from Last 3 Encounters:   11/26/18 136/74   02/14/18 136/82   10/26/17 (!) 128/98    Weight from Last 3 Encounters:   11/26/18 (!) 349 lb (158.3 kg)   02/14/18 (!) 339 lb 2 oz (153.8 kg)   10/23/17 (!) 337 lb 6 oz (153 kg)              We Performed the Following     *UA reflex to Microscopic and Culture ([facility] (except [place] and [place])     Basic metabolic panel  (Ca, Cl, CO2, Creat, Gluc, K, Na, BUN)     CBC with platelets and differential     EKG 12-lead complete w/read - Clinics        Primary Care Provider Office Phone # Fax #    Abner Moreno [phone] [address] [place] [address]        Equal Access to Services     TYRESE LEE : Hadii aad ku hadasho Soomaali, waaxda luqadaha, qaybta kaalmada adeegyada, waxay octavia rivera . So [facility] [phone].    ATENCIÓN: Si moniquela filomenaañol, tiene a de jesus disposición servicios gratuitos de asistencia lingüística. Llame al [phone].    We comply with applicable federal civil rights laws and [place] laws. We do not discriminate on the basis of race, color, national origin, age, disability, sex, sexual orientation, or gender identity.            Thank you!     Thank you for choosing " [facility]  for your care. Our goal is always to provide you with excellent care. Hearing back from our patients is one way we can continue to improve our services. Please take a few minutes to complete the written survey that you may receive in the mail after your visit with us. Thank you!             Your Updated Medication List - Protect others around you: Learn how to safely use, store and throw away your medicines at www.disposemymeds.org.          This list is accurate as of 11/26/18  6:31 PM.  Always use your most recent med list.                   Brand Name Dispense Instructions for use Diagnosis    acetaminophen 325 MG tablet    TYLENOL     Take 325-650 mg by mouth every 4 hours as needed        oxyCODONE IR 10 MG tablet    ROXICODONE    40 tablet    Take 1 tablet (10 mg) by mouth every 4 hours as needed for moderate to severe pain    Injury of back, subsequent encounter

## 2018-11-27 ENCOUNTER — TELEPHONE (OUTPATIENT)
Dept: FAMILY MEDICINE | Facility: OTHER | Age: 38
End: 2018-11-27

## 2018-11-27 DIAGNOSIS — D72.829 LEUKOCYTOSIS, UNSPECIFIED TYPE: Primary | ICD-10-CM

## 2018-11-27 NOTE — TELEPHONE ENCOUNTER
Reason for call:  [facility] is calling to have the pre-op sent over to them. He has surgery tomorrow morning at 830am. Please fax [phone]

## 2018-11-27 NOTE — TELEPHONE ENCOUNTER
Printed and faxed. Left message for patient to return call. Please assist in scheduling lab only appointment for CBC  Ghislaine Sheets CMA

## 2018-11-27 NOTE — TELEPHONE ENCOUNTER
The pre-op was received but it is still no completed fully, its needs to be signed and the drop boxes aren't completed. Please advise.

## 2018-11-27 NOTE — LETTER
[address]  [facility] [address]  [phone]        November 29, 2018    Sidney Plasencia  [address] [place] [address]          Dear Sidney,    We have faxed your pre-op information and EKG to your surgeons office. Please return to clinic in one week for a lab only appointment to recheck your CBC     Sincerely,        RADHA Jasso, CNP

## 2018-11-27 NOTE — TELEPHONE ENCOUNTER
Please fax pre op with EKG.   Please also have patient do recheck of CBC in 1 week for lab only.     RADHA Brown CNP

## 2018-11-27 NOTE — PROGRESS NOTES

## 2018-11-29 NOTE — TELEPHONE ENCOUNTER
Reason for Call:  Other returning call + call back     Detailed comments: Patient called clinic. Relayed message from below. He wants to know what the labs are for, before scheduling the appt. Please give him a call to explain why labs are needed.      Phone Number Patient can be reached at: Home number on file [phone] (home)    Best Time: any    Can we leave a detailed message on this number? YES    Call taken on 11/29/2018 at 11:03 AM by Susan Locke

## 2018-11-29 NOTE — TELEPHONE ENCOUNTER
3rd attempt, left message for patient to return call. If call is returned please assist in scheduling lab only appointment. Letter sent  Ghislaine Sheets CMA

## 2018-11-29 NOTE — TELEPHONE ENCOUNTER
Certainly,   When we did his pre-op he had an elevated white blood cell count. His infection work up was negative. I just want to make sure this is not something we need to look further into so I would like to repeat it to make sure it normalized.     Denice Cherry, RADHA CNP

## 2019-03-09 ENCOUNTER — HOSPITAL ENCOUNTER (EMERGENCY)
Facility: CLINIC | Age: 39
Discharge: HOME | End: 2019-03-09
Attending: NURSE PRACTITIONER | Admitting: NURSE PRACTITIONER
Payer: OTHER MISCELLANEOUS

## 2019-03-09 VITALS
HEART RATE: 81 BPM | BODY MASS INDEX: 38.36 KG/M2 | DIASTOLIC BLOOD PRESSURE: 99 MMHG | WEIGHT: 315 LBS | HEIGHT: 76 IN | RESPIRATION RATE: 20 BRPM | OXYGEN SATURATION: 97 % | SYSTOLIC BLOOD PRESSURE: 159 MMHG | TEMPERATURE: 98.7 F

## 2019-03-09 DIAGNOSIS — M54.50 ACUTE BILATERAL LOW BACK PAIN WITHOUT SCIATICA: ICD-10-CM

## 2019-03-09 PROCEDURE — 99284 EMERGENCY DEPT VISIT MOD MDM: CPT | Mod: Z6 | Performed by: NURSE PRACTITIONER

## 2019-03-09 PROCEDURE — 99283 EMERGENCY DEPT VISIT LOW MDM: CPT | Performed by: NURSE PRACTITIONER

## 2019-03-09 RX ORDER — PREDNISONE 10 MG/1
TABLET ORAL
Qty: 32 TABLET | Refills: 0 | Status: SHIPPED | OUTPATIENT
Start: 2019-03-09 | End: 2019-06-20

## 2019-03-09 RX ORDER — CYCLOBENZAPRINE HCL 10 MG
10 TABLET ORAL 3 TIMES DAILY PRN
Qty: 20 TABLET | Refills: 0 | Status: SHIPPED | OUTPATIENT
Start: 2019-03-09 | End: 2019-07-05 | Stop reason: SINTOL

## 2019-03-09 RX ORDER — OXYCODONE HYDROCHLORIDE 5 MG/1
5 TABLET ORAL EVERY 6 HOURS PRN
Qty: 12 TABLET | Refills: 0 | Status: SHIPPED | OUTPATIENT
Start: 2019-03-09 | End: 2019-06-20

## 2019-03-09 ASSESSMENT — MIFFLIN-ST. JEOR: SCORE: 2636.31

## 2019-03-09 ASSESSMENT — ENCOUNTER SYMPTOMS: BACK PAIN: 1

## 2019-03-09 NOTE — ED AVS SNAPSHOT
[facility]  [address] [place] [address]  Phone:  [phone]  Fax:  [phone]                                    Sidney Plasencia   MRN: [id number]    Department:  [facility]   Date of Visit:  3/9/2019           After Visit Summary Signature Page    I have received my discharge instructions, and my questions have been answered. I have discussed any challenges I see with this plan with the nurse or doctor.    ..........................................................................................................................................  Patient/Patient Representative Signature      ..........................................................................................................................................  Patient Representative Print Name and Relationship to Patient    ..................................................               ................................................  Date                                   Time    ..........................................................................................................................................  Reviewed by Signature/Title    ...................................................              ..............................................  Date                                               Time          22EPIC Rev 08/18

## 2019-03-09 NOTE — ED NOTES
"Pt reports back surgery about a year ago and has chronic numbness to right lower leg. States he has chronic pain that he lives with that he rates 5/10, reports pain has been increasing over the past week and then the shoveling yesterday put the pain to \"unbearable\" and rates an 8/10 when sitting up it goes up with activity. Pt states he is unable to get an appointment with his surgeon for 3 weeks. Pt reports he is unable to use Ibuprofen and Tylenol does nothing for his pain.   "

## 2019-03-10 NOTE — ED PROVIDER NOTES
History     Chief Complaint   Patient presents with     Back Pain     HPI  Sidney Plasencia is a 38 year old male who presents to the ED today with c/o acute on chronic back pain.  Patient has hx of chronic lower back pain with multiple surgeries and repair with nerve burning procedures.  Patient reports he always has pain but it has gotten worse since shoveling snow 3 days ago.  Patient has been taking Tylenol without any relief.  Patient is not currently taking any narcotics for pain.  Last Percocet Rx was in January by his back surgeon.  Patient denies any unilateral leg weakness or loss of bowel/bladder function.     Allergies:  Allergies   Allergen Reactions     No Known Drug Allergies        Problem List:    Patient Active Problem List    Diagnosis Date Noted     Morbid obesity (H) 11/26/2018     Priority: Medium     Spondylosis of lumbar region without myelopathy or radiculopathy 03/01/2018     Priority: Medium     Pilonidal cyst with abscess likely 03/16/2016     Priority: Medium     Back injury 02/10/2014     Priority: Medium        Past Medical History:    Past Medical History:   Diagnosis Date     NONSPECIFIC MEDICAL HISTORY 2/2003       Past Surgical History:    Past Surgical History:   Procedure Laterality Date     C APPENDECTOMY  2000     CYSTECTOMY PILONIDAL N/A 3/31/2017    Procedure: CYSTECTOMY PILONIDAL;  Surgeon: Dexter Mendez MD;  [facility]     DISCOGRAPHY N/A 10/26/2017    Procedure: DISCOGRAPHY;  discogram lumbar 2, 3, 4, 5 & S1;  Surgeon: Emmett Galvez MD;  [facility]     HC REMOVE TONSILS/ADENOIDS,<11 Y/O  1990    T & A <12y.o.     INJECT EPIDURAL LUMBAR N/A 4/24/2017    Procedure: INJECT EPIDURAL LUMBAR;  lumbar transforaminal epidural steroid injection Lumbar 4-5 and lumbar 5-Sacral 1;  Surgeon: Dexter Tomlinson MD;  [facility]     INJECT EPIDURAL TRANSFORAMINAL  3/13/2014    Procedure: INJECT EPIDURAL TRANSFORAMINAL;  transforaminal epidural steroid injection  "right lumbar 4-5, lumbar 5-sacral1;  Surgeon: Emmett Galvez MD;  [facility]       Family History:    Family History   Problem Relation Age of Onset     Diabetes Mother      Diabetes Maternal Grandfather      Lung Cancer Paternal Aunt      Coronary Artery Disease No family hx of      Hypertension No family hx of      Hyperlipidemia No family hx of      Cerebrovascular Disease No family hx of        Social History:  Marital Status:   [2]  Social History     Tobacco Use     Smoking status: Current Every Day Smoker     Packs/day: 1.00     Years: 20.00     Pack years: 20.00     Types: Cigarettes     Smokeless tobacco: Never Used   Substance Use Topics     Alcohol use: No     Alcohol/week: 0.0 oz     Comment: rare     Drug use: No        Medications:      cyclobenzaprine (FLEXERIL) 10 MG tablet   oxyCODONE (ROXICODONE) 5 MG tablet   predniSONE (DELTASONE) 10 MG tablet         Review of Systems   Musculoskeletal: Positive for back pain.   All other systems reviewed and are negative.      Physical Exam   BP: (!) 139/103  Pulse: 81  Heart Rate: 93  Temp: 98.7  F (37.1  C)  Resp: 18  Height: 193 cm (6' 4\")  Weight: (!) 161.5 kg (356 lb)  SpO2: 96 %      Physical Exam   Constitutional: He is oriented to person, place, and time. He appears well-developed and well-nourished. No distress.   HENT:   Head: Normocephalic.   Eyes: EOM are normal. Pupils are equal, round, and reactive to light.   Neck: Normal range of motion. Neck supple.   Cardiovascular: Normal rate.   Pulmonary/Chest: Effort normal.   Musculoskeletal: Normal range of motion. He exhibits tenderness (across lower lumbar back region). He exhibits no edema or deformity.   No pain with leg raise, strength is 5/5, distal pulses intact   Neurological: He is alert and oriented to person, place, and time. No cranial nerve deficit.   Skin: Skin is warm and dry. Capillary refill takes less than 2 seconds. He is not diaphoretic.       ED Course      "   Procedures    No results found for this or any previous visit (from the past 24 hour(s)).    Medications - No data to display    Assessments & Plan (with Medical Decision Making)  Acute exacerbation of chronic back pain.  No evidence of neuro/focal deficits.  No concerns for cauda equina.  Prednisone course.  Tylenol for pain.  Oxycodone for severe pain (any additional will have to be prescribed by back doctor.  Flexeril for muscle spasms.   Narcotic safety and side effects discussed in detail as well as reasons to return to the ED.   Patient agreeable to plan of care and discharged in stable condition.      I have reviewed the nursing notes.    I have reviewed the findings, diagnosis, plan and need for follow up with the patient.       Medication List      Started    cyclobenzaprine 10 MG tablet  Commonly known as:  FLEXERIL  10 mg, Oral, 3 TIMES DAILY PRN     oxyCODONE 5 MG tablet  Commonly known as:  ROXICODONE  5 mg, Oral, EVERY 6 HOURS PRN     predniSONE 10 MG tablet  Commonly known as:  DELTASONE  Take 4 tablets daily for 5 days,  take 2 tablets daily for 3 days, take 1 tablet daily for 3 days, take half a tablet for 3 days.            Final diagnoses:   Acute bilateral low back pain without sciatica       3/9/2019   [facility] EMERGENCY DEPARTMENT     Li Phan, RADHA CNP  03/09/19 [id number]

## 2019-04-10 ENCOUNTER — HOSPITAL ENCOUNTER (OUTPATIENT)
Dept: GENERAL RADIOLOGY | Facility: CLINIC | Age: 39
Discharge: HOME | End: 2019-04-10
Attending: ORTHOPAEDIC SURGERY | Admitting: ORTHOPAEDIC SURGERY
Payer: OTHER MISCELLANEOUS

## 2019-04-10 DIAGNOSIS — M43.26 FUSION OF SPINE OF LUMBAR REGION: ICD-10-CM

## 2019-04-10 PROCEDURE — 72100 X-RAY EXAM L-S SPINE 2/3 VWS: CPT

## 2019-06-20 DIAGNOSIS — M47.816 SPONDYLOSIS OF LUMBAR REGION WITHOUT MYELOPATHY OR RADICULOPATHY: Primary | ICD-10-CM

## 2019-06-20 RX ORDER — PREDNISONE 10 MG/1
TABLET ORAL
Qty: 32 TABLET | Refills: 0 | Status: SHIPPED | OUTPATIENT
Start: 2019-06-20 | End: 2019-07-05

## 2019-06-20 RX ORDER — OXYCODONE HYDROCHLORIDE 5 MG/1
5 TABLET ORAL EVERY 6 HOURS PRN
Qty: 12 TABLET | Refills: 0 | Status: SHIPPED | OUTPATIENT
Start: 2019-06-20 | End: 2019-07-05

## 2019-07-05 ENCOUNTER — TELEPHONE (OUTPATIENT)
Dept: FAMILY MEDICINE | Facility: CLINIC | Age: 39
End: 2019-07-05

## 2019-07-05 ENCOUNTER — OFFICE VISIT (OUTPATIENT)
Dept: FAMILY MEDICINE | Facility: CLINIC | Age: 39
End: 2019-07-05
Payer: OTHER MISCELLANEOUS

## 2019-07-05 VITALS
TEMPERATURE: 97.8 F | DIASTOLIC BLOOD PRESSURE: 84 MMHG | OXYGEN SATURATION: 100 % | BODY MASS INDEX: 38.36 KG/M2 | SYSTOLIC BLOOD PRESSURE: 140 MMHG | HEIGHT: 76 IN | HEART RATE: 104 BPM | WEIGHT: 315 LBS

## 2019-07-05 DIAGNOSIS — S39.92XD INJURY OF BACK, SUBSEQUENT ENCOUNTER: ICD-10-CM

## 2019-07-05 DIAGNOSIS — M96.1 FAILED BACK SURGICAL SYNDROME: Primary | ICD-10-CM

## 2019-07-05 PROCEDURE — 99213 OFFICE O/P EST LOW 20 MIN: CPT | Performed by: FAMILY MEDICINE

## 2019-07-05 RX ORDER — METHOCARBAMOL 750 MG/1
750 TABLET, FILM COATED ORAL 3 TIMES DAILY PRN
Qty: 25 TABLET | Refills: 3 | Status: SHIPPED | OUTPATIENT
Start: 2019-07-05 | End: 2024-08-22

## 2019-07-05 RX ORDER — PREDNISONE 10 MG/1
20 TABLET ORAL DAILY
Qty: 10 TABLET | Refills: 0 | Status: SHIPPED | OUTPATIENT
Start: 2019-07-05 | End: 2019-07-19

## 2019-07-05 RX ORDER — OXYCODONE HYDROCHLORIDE 10 MG/1
10 TABLET ORAL EVERY 6 HOURS PRN
Qty: 20 TABLET | Refills: 0 | Status: SHIPPED | OUTPATIENT
Start: 2019-07-05 | End: 2019-07-19

## 2019-07-05 ASSESSMENT — PAIN SCALES - GENERAL: PAINLEVEL: EXTREME PAIN (9)

## 2019-07-05 ASSESSMENT — MIFFLIN-ST. JEOR: SCORE: 2604.55

## 2019-07-05 NOTE — PROGRESS NOTES
Subjective     Sidney Plasencia is a 38 year old male who presents to clinic today for the following health issues:    HPI   Chief Complaint   Patient presents with     Back Pain     ongoing since 2014      Low back pain ongoing since 2014. Woke up in pain this morning.     Long-standing problems of back pain.  Yesterday was an ordinary day when he was doing tasks usually would do.  He went to [employer] last night.  He awakened this morning and is unable to move more than just change positions.  Once in the position he is comfortable but he moves slowly thereafter.  This is been an on and off skin process for some time.  Previous surgeon no longer feels he can do anything.  Work comp wants a second opinion.  They are in the process of arranging this.  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  Pain does not radiate it just stays in his back.  Most recent surgery was nerve ablation he believes.  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  He found that oxycodone 10 mg works better than 5.  Cyclobenzaprine makes him tired.  He does not believe he is use methocarbamol or tizanidine for muscle relaxation.    Patient Active Problem List   Diagnosis     Back injury     Pilonidal cyst with abscess likely     Spondylosis of lumbar region without myelopathy or radiculopathy     Morbid obesity (H)     Past Surgical History:   Procedure Laterality Date     C APPENDECTOMY  2000     CYSTECTOMY PILONIDAL N/A 3/31/2017    Procedure: CYSTECTOMY PILONIDAL;  Surgeon: Dexter Mendez MD;  [facility]     DISCOGRAPHY N/A 10/26/2017    Procedure: DISCOGRAPHY;  discogram lumbar 2, 3, 4, 5 & S1;  Surgeon: Emmett Galvez MD;  [facility]     HC REMOVE TONSILS/ADENOIDS,<13 Y/O  1990    T & A <12y.o.     INJECT EPIDURAL LUMBAR N/A 4/24/2017    Procedure: INJECT EPIDURAL LUMBAR;  lumbar transforaminal epidural steroid injection  "Lumbar 4-5 and lumbar 5-Sacral 1;  Surgeon: Dexter Tomlinson MD;  [facility]     INJECT EPIDURAL TRANSFORAMINAL  3/13/2014    Procedure: INJECT EPIDURAL TRANSFORAMINAL;  transforaminal epidural steroid injection right lumbar 4-5, lumbar 5-sacral1;  Surgeon: Emmett Galvez MD;  Location[facility]       Social History     Tobacco Use     Smoking status: Current Every Day Smoker     Packs/day: 1.00     Years: 20.00     Pack years: 20.00     Types: Cigarettes     Smokeless tobacco: Never Used   Substance Use Topics     Alcohol use: No     Alcohol/week: 0.0 oz     Comment: rare     Family History   Problem Relation Age of Onset     Diabetes Mother      Diabetes Maternal Grandfather      Lung Cancer Paternal Aunt      Coronary Artery Disease No family hx of      Hypertension No family hx of      Hyperlipidemia No family hx of      Cerebrovascular Disease No family hx of          BP Readings from Last 3 Encounters:   07/05/19 140/84   03/09/19 (!) 159/99   11/26/18 136/74    Wt Readings from Last 3 Encounters:   07/05/19 (!) 158.3 kg (349 lb)   03/09/19 (!) 161.5 kg (356 lb)   11/26/18 (!) 158.3 kg (349 lb)                    Reviewed and updated as needed this visit by Provider         Review of Systems   ROS COMP: Constitutional, HEENT, cardiovascular, pulmonary, gi and gu systems are negative, except as otherwise noted.      Objective    /84 (BP Location: Left arm, Patient Position: Standing, Cuff Size: Adult Large)   Pulse 104   Temp 97.8  F (36.6  C) (Temporal)   Ht 1.93 m (6' 4\")   Wt (!) 158.3 kg (349 lb)   SpO2 100%   BMI 42.48 kg/m    Body mass index is 42.48 kg/m .  Physical Exam   Patient is noted to be standing leaning against the exam table when I come into the room.  Any request to change positions is done slowly and deliberately.  He has good sensation in his feet.  He is not anxious to flex or hyperextend lumbar spine much if at all.    Diagnostic Test Results:  Labs reviewed in Epic    " "    Assessment & Plan       ICD-10-CM    1. 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 Injury of back, subsequent encounter S39.92XD methocarbamol (ROBAXIN) 750 MG tablet     predniSONE (DELTASONE) 10 MG tablet     oxyCODONE IR (ROXICODONE) 10 MG tablet        Tobacco Cessation:   reports that he has been smoking cigarettes.  He has a 20.00 pack-year smoking history. He has never used smokeless tobacco.        BMI:   Estimated body mass index is 42.48 kg/m  as calculated from the following:    Height as of this encounter: 1.93 m (6' 4\").    Weight as of this encounter: 158.3 kg (349 lb).           Patient seems to be at low risk to abuse pain medications given review of his history.  He uses them periodically and stops.  I have given him 2010 mg oxycodone.  I have added methocarbamol to see if this helps with muscle tightness.  Prednisone 20 for 5 days.  He will resume the activity he can tolerate when able.  He will see whomever the insurance allows him to see but I have given him the name of physician below who is a physiatry wrist who deals with chronic backs.  Since his surgery seems to be low benefit for him, physiatry might be a better choice and I know this physician will do her injections etc.  Patient is aware of possible side effects of these medications but has not had drowsiness etc. with their  use.  Methocarbamol also should not make him tired and he will plan to use it at bedtime  Patient Instructions   If there is a possibility to see Dr Louie Jones for back, insurance allows, I may suggest seeing him.       No follow-ups on file.    Frandy Ramirez Cerna MD  [facility]    "

## 2019-07-05 NOTE — TELEPHONE ENCOUNTER
Reason for call:  Other   Patient called regarding (reason for call): call back  Additional comments: patient has an appt for a follow up today at 215 with Dr. Cerna, when I went to schedule appt, it did not flag me as an overbook. I did change appt to a short at 215. I called patient back to offer another provider, he declined and is asking to be worked in with Dr. Cerna if that appt time works for today -     Will this be ok to leave him at the 215 for today as a short?     Phone number to reach patient:  Cell number on file:    Telephone Information:   Mobile [phone]       Best Time:  ASAP     Can we leave a detailed message on this number?  YES

## 2019-07-05 NOTE — PATIENT INSTRUCTIONS
If there is a possibility to see Dr Louie Jones for back, insurance allows, I may suggest seeing him.

## 2019-07-19 DIAGNOSIS — S39.92XD INJURY OF BACK, SUBSEQUENT ENCOUNTER: ICD-10-CM

## 2019-07-19 RX ORDER — OXYCODONE HYDROCHLORIDE 10 MG/1
10 TABLET ORAL EVERY 6 HOURS PRN
Qty: 20 TABLET | Refills: 0 | Status: SHIPPED | OUTPATIENT
Start: 2019-07-19 | End: 2019-08-02

## 2019-07-22 NOTE — TELEPHONE ENCOUNTER
Prednisone  Last Written Prescription Date:  7/5/19  Last Fill Quantity: 10,  # refills: 0   Last office visit: 7/5/2019 with prescribing provider:  7/5/19   Future Office Visit:      Requested Prescriptions   Pending Prescriptions Disp Refills     predniSONE (DELTASONE) 10 MG tablet [Pharmacy Med Name: PREDNISONE 10MG TABS] 10 tablet 0     Sig: TAKE 2 TABLETS(20MG) BY MOUTH DAILY       There is no refill protocol information for this order            Routing refill request to provider for review/approval because:  Drug not on the [facility] refill protocol       Camille Cordero RN. . .  7/22/2019, 11:22 AM

## 2019-07-24 RX ORDER — PREDNISONE 10 MG/1
TABLET ORAL
Qty: 10 TABLET | Refills: 0 | Status: SHIPPED | OUTPATIENT
Start: 2019-07-24 | End: 2024-08-22

## 2019-07-31 ENCOUNTER — TELEPHONE (OUTPATIENT)
Dept: FAMILY MEDICINE | Facility: CLINIC | Age: 39
End: 2019-07-31

## 2019-07-31 DIAGNOSIS — S39.92XD INJURY OF BACK, SUBSEQUENT ENCOUNTER: ICD-10-CM

## 2019-07-31 NOTE — TELEPHONE ENCOUNTER
Oxycodone      Last Written Prescription Date:  07/19/2019  Last Fill Quantity: 20,   # refills: 0  Last Office Visit: 07/05/2019  Future Office visit:       Routing refill request to provider for review/approval because:  Drug not on the [facility], [facility] or [facility] refill protocol or controlled substance      Kaila Stack MA 7/31/2019  11:36 AM

## 2019-08-02 RX ORDER — OXYCODONE HYDROCHLORIDE 10 MG/1
10 TABLET ORAL EVERY 6 HOURS PRN
Qty: 20 TABLET | Refills: 0 | Status: SHIPPED | OUTPATIENT
Start: 2019-08-02 | End: 2019-08-07

## 2019-08-02 NOTE — TELEPHONE ENCOUNTER
Patient was notified that Rx filled,  he states he has appointment with another surgeon on 8/14/19, that this is related to work comp issue. He will discuss this with them and long term care to follow. Eber/MA

## 2019-08-02 NOTE — TELEPHONE ENCOUNTER
Pt calling to check the status of this refill. He is wondering if Dr. Cerna could OK this today? He states it was originally prescribed by Dr. Cerna.  Thank you,  Serena Preston- Patient Representative

## 2019-08-02 NOTE — TELEPHONE ENCOUNTER
I saw him for one acute episode. Because narcotic use is so monitored, especially since July 1, he needs to see someone for a better long term plan.  Let him know refill approved and make him an appointment with someone to follow up.  I will not refill again without some one seeing him in follow up and even then I may not refill depending on his plan.  Frandy Cerna MD

## 2019-08-07 ENCOUNTER — TELEPHONE (OUTPATIENT)
Dept: FAMILY MEDICINE | Facility: CLINIC | Age: 39
End: 2019-08-07

## 2019-08-07 DIAGNOSIS — S39.92XD INJURY OF BACK, SUBSEQUENT ENCOUNTER: ICD-10-CM

## 2019-08-07 RX ORDER — OXYCODONE HYDROCHLORIDE 10 MG/1
10 TABLET ORAL EVERY 6 HOURS PRN
Qty: 20 TABLET | Refills: 0 | Status: SHIPPED | OUTPATIENT
Start: 2019-08-07 | End: 2019-08-14

## 2019-08-07 NOTE — TELEPHONE ENCOUNTER
Per RF- call him find out if he picked up last rx.    Called pt and he did pick it up. He has 4 pills left. He has a large amount of pain 8/10 right now. His upcoming appt is 08/14/19.     I relayed message to Tiffanie and he filled rx.  Tawnya Means, [facility]

## 2019-08-07 NOTE — PROGRESS NOTES
Script brought to [facility] pharmacy.  Tawnya Means, [facility]

## 2019-08-14 ENCOUNTER — TRANSFERRED RECORDS (OUTPATIENT)
Dept: HEALTH INFORMATION MANAGEMENT | Facility: CLINIC | Age: 39
End: 2019-08-14

## 2019-08-14 ENCOUNTER — TELEPHONE (OUTPATIENT)
Dept: FAMILY MEDICINE | Facility: CLINIC | Age: 39
End: 2019-08-14

## 2019-08-14 DIAGNOSIS — S39.92XD INJURY OF BACK, SUBSEQUENT ENCOUNTER: ICD-10-CM

## 2019-08-14 RX ORDER — OXYCODONE HYDROCHLORIDE 10 MG/1
10 TABLET ORAL EVERY 6 HOURS PRN
Qty: 30 TABLET | Refills: 0 | Status: SHIPPED | OUTPATIENT
Start: 2019-08-14 | End: 2019-08-22

## 2019-08-14 NOTE — TELEPHONE ENCOUNTER
Spoke with pharmacy looks like patient has only been getting the 7 day supply and not asking for any more.    We will keep at what we are doing until patient feels he is needing more   Kaylene EDDY

## 2019-08-14 NOTE — TELEPHONE ENCOUNTER
Oxycodone 10mg only allowed to fill qty 21 for day supply of 7 per ins. Do you want to try for a PA?      Thank You,  Ari Park, Pharmacy [facility] [place]

## 2019-08-20 DIAGNOSIS — S39.92XD INJURY OF BACK, SUBSEQUENT ENCOUNTER: ICD-10-CM

## 2019-08-20 NOTE — TELEPHONE ENCOUNTER
Oxycodone hcl 10 mg tab      Last Written Prescription Date:  08/14/19  Last Fill Quantity: 30,   # refills: 0  Last Office Visit: 07/05/19 with Dr. Crena   Future Office visit:       Routing refill request to provider for review/approval because:  Drug not on the [facility], [facility] or [facility] refill protocol or controlled substance

## 2019-08-22 ENCOUNTER — TELEPHONE (OUTPATIENT)
Dept: FAMILY MEDICINE | Facility: CLINIC | Age: 39
End: 2019-08-22

## 2019-08-22 RX ORDER — OXYCODONE HYDROCHLORIDE 10 MG/1
10 TABLET ORAL EVERY 6 HOURS PRN
Qty: 30 TABLET | Refills: 0 | Status: SHIPPED | OUTPATIENT
Start: 2019-08-22 | End: 2019-08-30

## 2019-08-22 NOTE — TELEPHONE ENCOUNTER
Prior Authorization Retail Medication Request    Medication/Dose: oxycodone 10mg  ICD code (if different than what is on RX):     Previously Tried and Failed:     Rationale:       Insurance Name:  [employer]  Insurance ID:  [id number]      Pharmacy Information (if different than what is on RX)  Name:     Phone:

## 2019-08-22 NOTE — TELEPHONE ENCOUNTER
oxycodone is now limited to only 2 fills in 54 days...did dr want to try pa?     Prior Authorization Retail Medication Request    Medication/Dose: oxycodone is now limited to only 2 fills in 54 days...did dr want to try pa?   ICD code (if different than what is on RX):    Previously Tried and Failed:  *na  Rationale:  na    Insurance Name:  [employer]   Insurance ID:  [id number]  Bin: [id number]  Group: [id number]  Pcn: [id number]  Phone: [phone]       Pharmacy Information (if different than what is on RX)  Name:  [facility]  Phone: [facility]

## 2019-08-26 NOTE — TELEPHONE ENCOUNTER
Per RF- ok to do prior auth. Will route to [facility] to set this up in Kims absence.   Tawnya Means, [facility]

## 2019-08-29 NOTE — TELEPHONE ENCOUNTER
PA Initiation    Medication: oxycodone 10mg  Insurance Company: [employer] ([facility]) - Phone [phone] Fax [phone]  Pharmacy Filling the Rx: [address]   Filling Pharmacy Phone: [phone]  Filling Pharmacy Fax:    Start Date: 8/29/2019    Central Prior Authorization Team   Phone: [phone]

## 2019-08-30 ENCOUNTER — TELEPHONE (OUTPATIENT)
Dept: FAMILY MEDICINE | Facility: CLINIC | Age: 39
End: 2019-08-30

## 2019-08-30 DIAGNOSIS — S39.92XD INJURY OF BACK, SUBSEQUENT ENCOUNTER: ICD-10-CM

## 2019-08-30 RX ORDER — OXYCODONE HYDROCHLORIDE 10 MG/1
10 TABLET ORAL EVERY 6 HOURS PRN
Qty: 30 TABLET | Refills: 0 | Status: SHIPPED | OUTPATIENT
Start: 2019-08-30 | End: 2019-09-10

## 2019-08-30 NOTE — TELEPHONE ENCOUNTER
Called patient and left a voicemail to call the clinic back, when the patient calls back please see the message from   Dr. Cerna gets relayed.      Alda Young, CMA

## 2019-08-30 NOTE — TELEPHONE ENCOUNTER
Prior Authorization Approval    Authorization Effective Date: 8/29/2019  Authorization Expiration Date: 2/29/2020  Medication: oxycodone 10mg  Approved Dose/Quantity: 30  Reference #: [id number]   Insurance Company: [employer] ([facility]) - Phone [phone] Fax [phone]  Which Pharmacy is filling the prescription (Not needed for infusion/clinic administered): [facility] [address]   Pharmacy Notified: Yes- relayed that approval for quantity limit has been approved. Pt already picked up medication on 8/23/19.

## 2019-08-30 NOTE — TELEPHONE ENCOUNTER
Reason for Call:  Medication or medication refill:    Do you use a [facility]?  Name of the pharmacy and phone number for the current request:  [facility]- [phone]    Name of the medication requested: Oxycodone     Other request: Pt will be out tomorrow. He is hoping another provider can address this ASAP today. He is going out of town tomorrow.     Can we leave a detailed message on this number? YES    Phone number patient can be reached at: Home number on file [phone] (home)    Best Time: any     Call taken on 8/30/2019 at 10:07 AM by Serena Preston

## 2019-08-30 NOTE — TELEPHONE ENCOUNTER
What do we know about his follow up?  With State of [place] guidelines now in place, an MD following this back problem should be responsible for narcotic pain meds.  Last message I see, he was planning to ask another MD about treatment options. Tell him, this clinic may not refill these again because he has no follow up here. It will depend on who is here and who is willing. I am not after today. Let him know this.   Frandy Cerna MD

## 2019-09-09 DIAGNOSIS — S39.92XD INJURY OF BACK, SUBSEQUENT ENCOUNTER: ICD-10-CM

## 2019-09-09 NOTE — TELEPHONE ENCOUNTER
Oxycodone  Last Written Prescription Date:  08/30/2019  Last Fill Quantity: 30,  # refills: 0   Last office visit: 7/5/2019 with prescribing provider:  Eryn   Future Office Visit:  None    Routing refill request to provider for review/approval because:  Drug not on the [facility] refill protocol     Maria C Figueroa RN

## 2019-09-10 ENCOUNTER — OFFICE VISIT (OUTPATIENT)
Dept: FAMILY MEDICINE | Facility: CLINIC | Age: 39
End: 2019-09-10
Payer: OTHER MISCELLANEOUS

## 2019-09-10 VITALS
WEIGHT: 315 LBS | DIASTOLIC BLOOD PRESSURE: 78 MMHG | HEART RATE: 94 BPM | BODY MASS INDEX: 43.21 KG/M2 | TEMPERATURE: 96.8 F | SYSTOLIC BLOOD PRESSURE: 138 MMHG | OXYGEN SATURATION: 96 % | RESPIRATION RATE: 14 BRPM

## 2019-09-10 DIAGNOSIS — S39.92XD INJURY OF BACK, SUBSEQUENT ENCOUNTER: Primary | ICD-10-CM

## 2019-09-10 PROCEDURE — 99213 OFFICE O/P EST LOW 20 MIN: CPT | Performed by: FAMILY MEDICINE

## 2019-09-10 RX ORDER — OXYCODONE HYDROCHLORIDE 10 MG/1
10 TABLET ORAL EVERY 6 HOURS PRN
Qty: 30 TABLET | Refills: 0 | Status: SHIPPED | OUTPATIENT
Start: 2019-09-10 | End: 2019-09-18

## 2019-09-10 ASSESSMENT — PAIN SCALES - GENERAL: PAINLEVEL: SEVERE PAIN (7)

## 2019-09-10 NOTE — PROGRESS NOTES
Subjective     Sidney Plasencia is a 39 year old male who presents to clinic today for the following health issues:    HPI   Chronic/Recurring Back Pain Follow Up      Where is your back pain located? (Select all that apply) low back central    How would you describe your back pain?  stabbing    Where does your back pain spread? nowhere    Since your last clinic visit for back pain, how has your pain changed? unchanged    Does your back pain interfere with your job? YES    Since your last visit, have you tried any new treatment? No        How many servings of fruits and vegetables do you eat daily?  2-3    On average, how many sweetened beverages do you drink each day (soda, juice, sweet tea, etc)?   3    How many days per week do you miss taking your medication? 0    Brennen is still fighting with work comp get further coverage for his back.  He still suffering from pain.  He does use an occasional oxycodone.  He is continuing to work and has not missed work.  No other complaints at this time.        Review of Systems   ROS COMP: Constitutional, HEENT, cardiovascular, pulmonary, gi and gu systems are negative, except as otherwise noted.  Back pain which is been significant for the patient he is just getting very tired of it and wants this to be over.      Objective    /78   Pulse 94   Temp 96.8  F (36  C) (Temporal)   Resp 14   Wt (!) 161 kg (355 lb)   SpO2 96%   BMI 43.21 kg/m    Body mass index is 43.21 kg/m .  Physical Exam   GENERAL: healthy, alert and no distress  MS: She has limited forward flexion extension of the lumbar spine secondary to discomfort is good rotation gait is normal.  Lower extremity muscle strength normal.      Assessment: Back injury with persistent back pain and status post a couple of lumbar surgeries, multiple back injections    Plan:  Patient is waiting for work comp to tell him what the next step is so he can move forward he continues to work is not missing work.  Refill  his oxycodone for him he has not had accelerated use.  He is very judicious with this medication.     Abner Moreno MD

## 2019-09-18 DIAGNOSIS — S39.92XD INJURY OF BACK, SUBSEQUENT ENCOUNTER: ICD-10-CM

## 2019-09-18 RX ORDER — OXYCODONE HYDROCHLORIDE 10 MG/1
10 TABLET ORAL EVERY 6 HOURS PRN
Qty: 30 TABLET | Refills: 0 | Status: SHIPPED | OUTPATIENT
Start: 2019-09-18 | End: 2019-09-24

## 2019-09-18 NOTE — TELEPHONE ENCOUNTER
Please call the patient and ask when he is going to get in to see the spine care physician so they can start feeling this for him.  I believe he is waiting for work comp to approve him to move forward but continue to fill narcotics may become a problem for us since we are not caring for him for this problem.  Please let him know and help him in any way to get into spine care if we can

## 2019-09-18 NOTE — TELEPHONE ENCOUNTER
Oxycodone hcl 10 mg tabs      Last Written Prescription Date:  09/10/19  Last Fill Quantity: 30,   # refills: 0  Last Office Visit: 09/10/19  Future Office visit:       Routing refill request to provider for review/approval because:  Drug not on the [facility], [facility] or [facility] refill protocol or controlled substance

## 2019-09-24 ENCOUNTER — TELEPHONE (OUTPATIENT)
Dept: FAMILY MEDICINE | Facility: CLINIC | Age: 39
End: 2019-09-24

## 2019-09-24 DIAGNOSIS — S39.92XD INJURY OF BACK, SUBSEQUENT ENCOUNTER: ICD-10-CM

## 2019-09-24 RX ORDER — OXYCODONE HYDROCHLORIDE 10 MG/1
10 TABLET ORAL EVERY 6 HOURS PRN
Qty: 30 TABLET | Refills: 0 | Status: SHIPPED | OUTPATIENT
Start: 2019-09-27 | End: 2019-10-08

## 2019-09-24 NOTE — TELEPHONE ENCOUNTER
Pt calling. He is hoping to speak to Dr. Moreno's nurse about all his back issues he is dealing with. He did not want to go into detail further with me.   Thank you,  Serena Preston- Patient Representative

## 2019-09-24 NOTE — TELEPHONE ENCOUNTER
I have attempted to contact this patient by phone with the following results: left message to return my call on answering machine.    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 We want to make sure the right one gets ordered.   Get this information and route back to Tiffanie when he calls back.  Tawnya Means, [facility]

## 2019-09-24 NOTE — TELEPHONE ENCOUNTER
Pt called back and read his paperwork stating he needs a high resolution ct lumbar scan. Kaylene talked to him and verified so we order the right one. The right one is ordered. He would like this as early in the morning or as late in evening as possible.  Tawnya Means, [facility]

## 2019-09-24 NOTE — TELEPHONE ENCOUNTER
Called pt and he met with an independent medical examiner regarding his work comp back pain. They recommended that he get a ct scan done. I did speak with Dr. Moreno and he said it was fine to order this. He also would like a refill of his oxycodone dated for this Friday. Pharmacy [facility].  Will route to  to set up CT scan and order meds and route to Tiffanie.  Tawnya Means, [facility]

## 2019-10-07 ENCOUNTER — TELEPHONE (OUTPATIENT)
Dept: FAMILY MEDICINE | Facility: CLINIC | Age: 39
End: 2019-10-07

## 2019-10-07 DIAGNOSIS — S39.92XD INJURY OF BACK, SUBSEQUENT ENCOUNTER: ICD-10-CM

## 2019-10-07 RX ORDER — OXYCODONE HYDROCHLORIDE 10 MG/1
10 TABLET ORAL EVERY 6 HOURS PRN
Qty: 30 TABLET | Refills: 0 | Status: CANCELLED | OUTPATIENT
Start: 2019-10-07

## 2019-10-07 NOTE — TELEPHONE ENCOUNTER
Reason for Call:  Medication or medication refill:    Do you use a [facility]?  Name of the pharmacy and phone number for the current request:  [facility]     Name of the medication requested: Oxycodone     Other request: Pt is out, hoping this can be done ASAP.     Can we leave a detailed message on this number? YES    Phone number patient can be reached at: [phone]    Best Time: any    Call taken on 10/7/2019 at 8:54 AM by Serena Preston

## 2019-10-07 NOTE — TELEPHONE ENCOUNTER
oxycodone      Last Written Prescription Date:  9/27/19  Last Fill Quantity: 30,   # refills: 0  Last Office Visit: 9/10/19  Future Office visit:       Routing refill request to provider for review/approval because:  Drug not on the [facility], [facility] or [facility] refill protocol or controlled substance

## 2019-10-07 NOTE — TELEPHONE ENCOUNTER
Reason for Call:  Other      Detailed comments: Sidney has questions pertaining to his work restrictions. He is also wondering if he can talk to you before his CT scan on Wednesday.     Phone Number Patient can be reached at: Home number on file [phone] (home)    Best Time: any     Can we leave a detailed message on this number? YES    Call taken on 10/7/2019 at 12:53 PM by Aurora Price

## 2019-10-08 ENCOUNTER — TELEPHONE (OUTPATIENT)
Dept: FAMILY MEDICINE | Facility: CLINIC | Age: 39
End: 2019-10-08

## 2019-10-08 ENCOUNTER — TELEPHONE (OUTPATIENT)
Dept: PALLIATIVE MEDICINE | Facility: CLINIC | Age: 39
End: 2019-10-08

## 2019-10-08 DIAGNOSIS — M96.1 FAILED BACK SURGICAL SYNDROME: Primary | ICD-10-CM

## 2019-10-08 DIAGNOSIS — S39.92XD INJURY OF BACK, SUBSEQUENT ENCOUNTER: ICD-10-CM

## 2019-10-08 DIAGNOSIS — M47.816 SPONDYLOSIS OF LUMBAR REGION WITHOUT MYELOPATHY OR RADICULOPATHY: ICD-10-CM

## 2019-10-08 RX ORDER — OXYCODONE HYDROCHLORIDE 10 MG/1
10 TABLET ORAL EVERY 6 HOURS PRN
Qty: 30 TABLET | Refills: 0 | Status: SHIPPED | OUTPATIENT
Start: 2019-10-08 | End: 2019-10-14

## 2019-10-09 ENCOUNTER — HOSPITAL ENCOUNTER (OUTPATIENT)
Dept: CT IMAGING | Facility: CLINIC | Age: 39
Discharge: HOME | End: 2019-10-09
Attending: FAMILY MEDICINE | Admitting: FAMILY MEDICINE
Payer: OTHER MISCELLANEOUS

## 2019-10-09 DIAGNOSIS — S39.92XD INJURY OF BACK, SUBSEQUENT ENCOUNTER: ICD-10-CM

## 2019-10-09 PROCEDURE — 72131 CT LUMBAR SPINE W/O DYE: CPT

## 2019-10-14 ENCOUNTER — TELEPHONE (OUTPATIENT)
Dept: FAMILY MEDICINE | Facility: CLINIC | Age: 39
End: 2019-10-14

## 2019-10-14 DIAGNOSIS — M96.1 FAILED BACK SURGICAL SYNDROME: Primary | ICD-10-CM

## 2019-10-14 DIAGNOSIS — M47.816 SPONDYLOSIS OF LUMBAR REGION WITHOUT MYELOPATHY OR RADICULOPATHY: ICD-10-CM

## 2019-10-14 DIAGNOSIS — S39.92XD INJURY OF BACK, SUBSEQUENT ENCOUNTER: ICD-10-CM

## 2019-10-14 RX ORDER — OXYCODONE HYDROCHLORIDE 10 MG/1
10 TABLET ORAL 3 TIMES DAILY
Qty: 30 TABLET | Refills: 0 | Status: SHIPPED | OUTPATIENT
Start: 2019-10-14

## 2019-10-14 NOTE — TELEPHONE ENCOUNTER
Script of Roxicodone 1 tablet 3 times daily sent to the pharmacy with # 30.  Should last until his appointment on 10/24/19 with the pain clinic

## 2019-10-14 NOTE — TELEPHONE ENCOUNTER
Per Kaylene pt is opting to go outside facility.      Jany BAUTISTA    [facility]

## 2019-10-14 NOTE — TELEPHONE ENCOUNTER
Patient is looking for CT results I accidentally closed encounter when informed patient of script and faxed LAURA.    Please call patient with CT results.    Thank you   Kaylene EDDY

## 2019-10-14 NOTE — TELEPHONE ENCOUNTER
Spoke with Sindey and he states he will need pain meds on Thursday or Friday. He states he was told to go down to 2 a day but he is in a lot of pain and would like to take 3 per day. He is also still waiting for ct results as stated in earlier message.  Tawnya Means, [facility]            Moy Moreno states that pt told him he was only taking 2 per day. How much is he taking? They were just filled on 10/08.

## 2019-10-14 NOTE — TELEPHONE ENCOUNTER
Patient was also asking about a pain medication refill his appointment is scheduled for Oct 24th.  He will need to have a fill before that appointment.  He was told that his appointment he may not get a refill at that time and may need to have another one if they do not fill right away at that first appointment.  Since it is a consultation and review of symptoms.

## 2019-10-14 NOTE — TELEPHONE ENCOUNTER
Spoke with patient,     1. he would like the results of his CT scan was the first question.    2. Patient is seeing a pain specialist out of the  system called [facility].  Phone number is [phone]  He will need all records that pertain to his work comp from 2014 to present.  I will get a CD ready but he will need to sign a release form to release 5 yrs of records to this new pain clinic.     Called radiology they will get the CD ready for the patient to .     Spoke with the priority LAURA and they stated that the patient will need to come to the clinic sign a release with the fax number and group it is going to we need to send it as a STAT REQUEST with the date needed to that facility.      Spoke with the patient the Fax number to [facility] is [phone] Attention Yann for records.

## 2019-10-15 NOTE — TELEPHONE ENCOUNTER
I have attempted to contact this patient by phone with the following results: left message to return my call on answering machine.  Please relay message below. I brought papers up to .  Tawnya Means, [facility]          Per RF- Some disc bulging and some artifact appeared on scan but that is common with disc replacement. Scan normal otherwise. He can  copy of ct results at [facility] .

## 2019-10-15 NOTE — TELEPHONE ENCOUNTER
Sidney returns call and result message is relayed.      Sidney asks if a copy of everything has been sent to  for him.

## 2019-10-16 NOTE — TELEPHONE ENCOUNTER
Reason for Call:  Other     Detailed comments: [employer]INO[employer] insurance calling requesting last office note with Dr Moreno.  Oxycodone medication needs to go through peer review for approval.    Please fax this to: [phone]  ATTN: Ghislaine    Phone Number Patient can be reached at: [phone]    Best Time: any    Can we leave a detailed message on this number? YES    Call taken on 10/16/2019 at 2:21 PM by Olamide Cardoza

## 2019-11-20 ENCOUNTER — IMMUNIZATION (OUTPATIENT)
Dept: FAMILY MEDICINE | Facility: CLINIC | Age: 39
End: 2019-11-20
Payer: COMMERCIAL

## 2019-11-20 PROCEDURE — 90686 IIV4 VACC NO PRSV 0.5 ML IM: CPT

## 2019-11-20 PROCEDURE — 90471 IMMUNIZATION ADMIN: CPT

## 2020-05-01 ENCOUNTER — HOSPITAL ENCOUNTER (EMERGENCY)
Facility: CLINIC | Age: 40
Discharge: HOME | End: 2020-05-01
Attending: PHYSICIAN ASSISTANT | Admitting: PHYSICIAN ASSISTANT
Payer: COMMERCIAL

## 2020-05-01 VITALS
DIASTOLIC BLOOD PRESSURE: 102 MMHG | RESPIRATION RATE: 19 BRPM | SYSTOLIC BLOOD PRESSURE: 158 MMHG | TEMPERATURE: 98.6 F | OXYGEN SATURATION: 98 %

## 2020-05-01 DIAGNOSIS — S61.011A LACERATION OF RIGHT THUMB: ICD-10-CM

## 2020-05-01 PROCEDURE — 12001 RPR S/N/AX/GEN/TRNK 2.5CM/<: CPT | Mod: Z6 | Performed by: PHYSICIAN ASSISTANT

## 2020-05-01 PROCEDURE — 99282 EMERGENCY DEPT VISIT SF MDM: CPT | Mod: 25 | Performed by: PHYSICIAN ASSISTANT

## 2020-05-01 PROCEDURE — 12001 RPR S/N/AX/GEN/TRNK 2.5CM/<: CPT | Performed by: PHYSICIAN ASSISTANT

## 2020-05-01 PROCEDURE — 99283 EMERGENCY DEPT VISIT LOW MDM: CPT | Performed by: PHYSICIAN ASSISTANT

## 2020-05-01 RX ORDER — LIDOCAINE HYDROCHLORIDE 10 MG/ML
1 INJECTION, SOLUTION INFILTRATION; PERINEURAL ONCE
Status: COMPLETED | OUTPATIENT
Start: 2020-05-01 | End: 2020-05-01

## 2020-05-01 RX ADMIN — LIDOCAINE HYDROCHLORIDE 1 ML: 10 INJECTION, SOLUTION INFILTRATION; PERINEURAL at 19:39

## 2020-05-01 NOTE — ED AVS SNAPSHOT
[facility] Emergency Department  [address] [place] [address]  Phone:  [phone]  Fax:  [phone]                                    Sidney Plasencia   MRN: [id number]    Department:  [facility] Emergency Department   Date of Visit:  5/1/2020           After Visit Summary Signature Page    I have received my discharge instructions, and my questions have been answered. I have discussed any challenges I see with this plan with the nurse or doctor.    ..........................................................................................................................................  Patient/Patient Representative Signature      ..........................................................................................................................................  Patient Representative Print Name and Relationship to Patient    ..................................................               ................................................  Date                                   Time    ..........................................................................................................................................  Reviewed by Signature/Title    ...................................................              ..............................................  Date                                               Time          22EPIC Rev 08/18

## 2020-05-02 NOTE — ED TRIAGE NOTES
Pt presents with concerns of right thumb laceration.  Pt states that he cut it on a ratchet strap.  Tetanus vaccine last in 2017.

## 2020-05-02 NOTE — DISCHARGE INSTRUCTIONS
Please have the stitches removed in 1 week in the clinic.  Keep the wound covered with a bandage and antibiotic ointment.  You can wash with warm soapy water at home but do not submerge under water until stitches are removed.  In regard to the numbness, this should resolve on its own in the next few weeks to month or so.  If numbness persists, follow-up in the clinic for reassessment.  If you develop any worsening concerns please do not hesitate to return to the emergency department.    Thank you for choosing [facility]'s Emergency Department. It was a pleasure taking care of you today. If you have any questions, please call [phone].    Yamel Paulino PA-C

## 2020-05-02 NOTE — ED PROVIDER NOTES
History     Chief Complaint   Patient presents with     Laceration     HPI  Sidney Plasencia is a 39 year old male who presents to the emergency department complaining of a right thumb laceration. The patient reports he was trying to ratchet down a tire with a ratchet strap when the strep somehow caught him in the right thumb.  His wife wrapped his thumb with a paper towel to control the bleeding and then he came here.  He complains of pain around the cut but then the rest of the thumb feels tingly or numb.  He denies any other injuries.  He is able to move the thumb okay.  His last tetanus was 2017.      Allergies:  Allergies   Allergen Reactions     No Known Drug Allergies        Problem List:    Patient Active Problem List    Diagnosis Date Noted     Morbid obesity (H) 11/26/2018     Priority: Medium     Spondylosis of lumbar region without myelopathy or radiculopathy 03/01/2018     Priority: Medium     Pilonidal cyst with abscess likely 03/16/2016     Priority: Medium     Back injury 02/10/2014     Priority: Medium        Past Medical History:    Past Medical History:   Diagnosis Date     NONSPECIFIC MEDICAL HISTORY 2/2003       Past Surgical History:    Past Surgical History:   Procedure Laterality Date     C APPENDECTOMY  2000     CYSTECTOMY PILONIDAL N/A 3/31/2017    Procedure: CYSTECTOMY PILONIDAL;  Surgeon: Dexter Mendez MD;  [facility]: [facility] OR     DISCOGRAPHY N/A 10/26/2017    Procedure: DISCOGRAPHY;  discogram lumbar 2, 3, 4, 5 & S1;  Surgeon: Emmett Galvez MD;  Location: [facility] OR     HC REMOVE TONSILS/ADENOIDS,<13 Y/O  1990    T & A <12y.o.     INJECT EPIDURAL LUMBAR N/A 4/24/2017    Procedure: INJECT EPIDURAL LUMBAR;  lumbar transforaminal epidural steroid injection Lumbar 4-5 and lumbar 5-Sacral 1;  Surgeon: Dexter Tomlinson MD;  Location: [facility] OR     INJECT EPIDURAL TRANSFORAMINAL  3/13/2014    Procedure: INJECT EPIDURAL TRANSFORAMINAL;  transforaminal epidural steroid injection right lumbar  4-5, lumbar 5-sacral1;  Surgeon: Emmett Galvez MD;  Location: [facility] OR       Family History:    Family History   Problem Relation Age of Onset     Diabetes Mother      Diabetes Maternal Grandfather      Lung Cancer Paternal Aunt      Coronary Artery Disease No family hx of      Hypertension No family hx of      Hyperlipidemia No family hx of      Cerebrovascular Disease No family hx of        Social History:  Marital Status:   [2]  Social History     Tobacco Use     Smoking status: Current Every Day Smoker     Packs/day: 1.00     Years: 20.00     Pack years: 20.00     Types: Cigarettes     Smokeless tobacco: Never Used   Substance Use Topics     Alcohol use: No     Alcohol/week: 0.0 standard drinks     Comment: rare     Drug use: No        Medications:    methocarbamol (ROBAXIN) 750 MG tablet  oxyCODONE IR (ROXICODONE) 10 MG tablet  predniSONE (DELTASONE) 10 MG tablet          Review of Systems   All other systems reviewed and are negative.      Physical Exam   BP: (!) 158/102  Heart Rate: 90  Temp: 98.6  F (37  C)  Resp: 19  SpO2: 98 %      Physical Exam  Vitals signs and nursing note reviewed.   Constitutional:       General: He is not in acute distress.     Appearance: Normal appearance. He is obese. He is not ill-appearing, toxic-appearing or diaphoretic.   HENT:      Head: Normocephalic and atraumatic.      Nose: Nose normal.   Eyes:      Extraocular Movements: Extraocular movements intact.      Conjunctiva/sclera: Conjunctivae normal.   Cardiovascular:      Pulses: Normal pulses.   Pulmonary:      Effort: Pulmonary effort is normal. No respiratory distress.   Musculoskeletal:      Comments: Right hand: At the palmar base of the thumb there is a 2 cm horizontally aligned jagged laceration.  No active bleeding. No evidence of tendon involvement. Patient exhibits normal strength throughout the thumb.  Reports diminished sensation at the tip of the finger but he has brisk capillary refill. No other injuries  demonstrated on right hand.   Skin:     General: Skin is warm and dry.   Neurological:      Mental Status: He is alert and oriented to person, place, and time. Mental status is at baseline.   Psychiatric:         Mood and Affect: Mood normal.         Behavior: Behavior normal.         ED Course        [facility]    -Laceration Repair    Date/Time: 5/1/2020 8:01 PM  Performed by: Yamel Paulino PA-C  Authorized by: Yamel Paulino PA-C       ANESTHESIA (see MAR for exact dosages):     Anesthesia method:  Local infiltration    Local anesthetic:  Lidocaine 1% w/o epi  LACERATION DETAILS     Location:  Finger    Finger location:  R thumb    Length (cm):  2    REPAIR TYPE:     Repair type:  Simple      EXPLORATION:     Wound exploration: wound explored through full range of motion and entire depth of wound probed and visualized      Wound extent: fascia not violated, no signs of injury, no tendon damage and no underlying fracture      TREATMENT:     Area cleansed with:  Saline    Amount of cleaning:  Extensive    Irrigation solution:  Sterile saline    Irrigation method:  Syringe    SKIN REPAIR     Repair method:  Sutures    Suture size:  4-0    Suture material:  Nylon    Suture technique:  Simple interrupted    Number of sutures:  4    APPROXIMATION     Approximation:  Close    POST-PROCEDURE DETAILS     Dressing:  Antibiotic ointment and adhesive bandage      PROCEDURE   Patient Tolerance:  Patient tolerated the procedure well with no immediate complications            No results found for this or any previous visit (from the past 24 hour(s)).    Medications   lidocaine 1 % injection 1 mL (1 mL Infiltration Given 5/1/20 1939)       Assessments & Plan (with Medical Decision Making)  Sidney Plasencia is a 39 year old male who presented to the ED with a laceration to the base of the right thumb.  He denies any other injuries.  Reports some numbness but denies any weakness.   On exam today he did have a 2 cm horizontally aligned jagged laceration to the palmar base of the right thumb.  He had normal strength throughout the thumb with brisk capillary refill.  Wound did not appear to involve any tendons and was not significantly deep.  He did report persistent numbness in the thumb, suspect related to bruising of nerve given mechanism.  Wound was repaired as detailed above.  I advised suture removal in 1 week.  Tetanus is currently up-to-date.  He was advised to be reassessed if numbness persists after a few weeks.  He was given instructions on wound cares as well as indications of when to return to the ED.  All questions were answered and the patient was discharged home in suitable condition.     I have reviewed the nursing notes.    I have reviewed the findings, diagnosis, plan and need for follow up with the patient.    New Prescriptions    No medications on file       Final diagnoses:   Laceration of right thumb     Note: Chart documentation done in part with Dragon Voice Recognition software. Although reviewed after completion, some word and grammatical errors may remain.     5/1/2020   [facility] EMERGENCY DEPARTMENT     Yamel Paulino PA-C  05/01/20 2005

## 2020-10-26 NOTE — PROGRESS NOTES
F/U for excision of pilonidal cyst      Subjective:  Pt feels good.  No complaints      Objective:  B/P: Data Unavailable, T: 97, P: 74, R: Data Unavailable  Back: Incision clean, healing well.  Sutures removed.    SPECIMEN(S):   Pilonidal cyst     FINAL DIAGNOSIS:   Pilonidal cyst:   - Pilonidal cyst with deep dermal abscess.     Electronically signed out by:     ROMEO Melchor M.D.         Assessment/Plan:  Pt s/p excision of pilonidal abscess - doing well.  He will increase his activity as tolerated and f/u with me PRN.    Dexter Mendez MD, FACS   From: Vale Verma  To: Christina Honeycutt MD  Sent: 10/26/2020 5:08 PM EDT  Subject: Non-Urgent Medical Question    Dr Tierney Her,  Could your office staff send me and Randal's dates of the pneumonia shots that we have  received in the past? I need the dates before Wednesday, [address] [facility].   Thank you,  Cullen Kerr [facility] Center[facility] Solitario

## 2020-11-08 ENCOUNTER — HOSPITAL ENCOUNTER (EMERGENCY)
Facility: CLINIC | Age: 40
Discharge: HOME | End: 2020-11-09
Attending: PHYSICIAN ASSISTANT | Admitting: PHYSICIAN ASSISTANT
Payer: COMMERCIAL

## 2020-11-08 DIAGNOSIS — L03.116 CELLULITIS OF LEFT LOWER EXTREMITY: ICD-10-CM

## 2020-11-08 DIAGNOSIS — M79.89 LEFT LEG SWELLING: ICD-10-CM

## 2020-11-08 PROCEDURE — 99284 EMERGENCY DEPT VISIT MOD MDM: CPT | Mod: 25 | Performed by: EMERGENCY MEDICINE

## 2020-11-08 PROCEDURE — 99284 EMERGENCY DEPT VISIT MOD MDM: CPT | Performed by: EMERGENCY MEDICINE

## 2020-11-08 ASSESSMENT — MIFFLIN-ST. JEOR: SCORE: 2508.37

## 2020-11-08 NOTE — ED AVS SNAPSHOT
[facility] Emergency Dept  [address] [place] [address]  Phone: [phone]  Fax: [phone]                                    Sidney Plasencia   MRN: [id number]    Department: [facility] Emergency Dept   Date of Visit: 11/8/2020           After Visit Summary Signature Page    I have received my discharge instructions, and my questions have been answered. I have discussed any challenges I see with this plan with the nurse or doctor.    ..........................................................................................................................................  Patient/Patient Representative Signature      ..........................................................................................................................................  Patient Representative Print Name and Relationship to Patient    ..................................................               ................................................  Date                                   Time    ..........................................................................................................................................  Reviewed by Signature/Title    ...................................................              ..............................................  Date                                               Time          22EPIC Rev 08/18

## 2020-11-09 ENCOUNTER — APPOINTMENT (OUTPATIENT)
Dept: ULTRASOUND IMAGING | Facility: CLINIC | Age: 40
End: 2020-11-09
Attending: PHYSICIAN ASSISTANT
Payer: COMMERCIAL

## 2020-11-09 VITALS
SYSTOLIC BLOOD PRESSURE: 136 MMHG | HEART RATE: 78 BPM | WEIGHT: 315 LBS | HEIGHT: 76 IN | RESPIRATION RATE: 18 BRPM | DIASTOLIC BLOOD PRESSURE: 82 MMHG | BODY MASS INDEX: 38.36 KG/M2 | TEMPERATURE: 98.4 F | OXYGEN SATURATION: 96 %

## 2020-11-09 PROCEDURE — 250N000013 HC RX MED GY IP 250 OP 250 PS 637: Performed by: EMERGENCY MEDICINE

## 2020-11-09 PROCEDURE — 93971 EXTREMITY STUDY: CPT | Mod: LT

## 2020-11-09 PROCEDURE — 250N000013 HC RX MED GY IP 250 OP 250 PS 637: Performed by: PHYSICIAN ASSISTANT

## 2020-11-09 RX ORDER — CEPHALEXIN 500 MG/1
500 CAPSULE ORAL 4 TIMES DAILY
Qty: 28 CAPSULE | Refills: 0 | Status: SHIPPED | OUTPATIENT
Start: 2020-11-09 | End: 2020-11-16

## 2020-11-09 RX ADMIN — CEPHALEXIN 500 MG: 250 CAPSULE ORAL at 01:33

## 2020-11-09 NOTE — ED PROVIDER NOTES
History     Chief Complaint   Patient presents with     Leg Swelling     HPI  Sidney Plasencia is a 40 year old male who presents to the emergency department complaining of left lower leg swelling.  Patient reports he hit his left shin on a trailer a couple weeks ago and has a little scab from that.  4 days ago he started having pain to the left anterior shin and in the last couple days he has noticed increased redness, pain, and swelling.  He has had more pain with activity and walking today so his wife made him come in for evaluation.  He has not had any fever or body aches.  He denies any chest pain or shortness of breath.  He has oxycodone at home for chronic back pain which he has used for pain with his leg.  No history of blood clotting disorders.        Allergies:  Allergies   Allergen Reactions     No Known Drug Allergies        Problem List:    Patient Active Problem List    Diagnosis Date Noted     Morbid obesity (H) 11/26/2018     Priority: Medium     Spondylosis of lumbar region without myelopathy or radiculopathy 03/01/2018     Priority: Medium     Pilonidal cyst with abscess likely 03/16/2016     Priority: Medium     Back injury 02/10/2014     Priority: Medium        Past Medical History:    Past Medical History:   Diagnosis Date     NONSPECIFIC MEDICAL HISTORY 2/2003       Past Surgical History:    Past Surgical History:   Procedure Laterality Date     C APPENDECTOMY  2000     CYSTECTOMY PILONIDAL N/A 3/31/2017    Procedure: CYSTECTOMY PILONIDAL;  Surgeon: Dexter Mendez MD;  [facility] OR     DISCOGRAPHY N/A 10/26/2017    Procedure: DISCOGRAPHY;  discogram lumbar 2, 3, 4, 5 & S1;  Surgeon: Emmett Galvez MD;  Location:  OR     HC REMOVE TONSILS/ADENOIDS,<13 Y/O  1990    T & A <12y.o.     INJECT EPIDURAL LUMBAR N/A 4/24/2017    Procedure: INJECT EPIDURAL LUMBAR;  lumbar transforaminal epidural steroid injection Lumbar 4-5 and lumbar 5-Sacral 1;  Surgeon: Dexter Tomlinson MD;   "Location: [facility] OR     INJECT EPIDURAL TRANSFORAMINAL  3/13/2014    Procedure: INJECT EPIDURAL TRANSFORAMINAL;  transforaminal epidural steroid injection right lumbar 4-5, lumbar 5-sacral1;  Surgeon: Emmett Galvez MD;  Location: [facility] OR       Family History:    Family History   Problem Relation Age of Onset     Diabetes Mother      Diabetes Maternal Grandfather      Lung Cancer Paternal Aunt      Coronary Artery Disease No family hx of      Hypertension No family hx of      Hyperlipidemia No family hx of      Cerebrovascular Disease No family hx of        Social History:  Marital Status:   [2]  Social History     Tobacco Use     Smoking status: Current Every Day Smoker     Packs/day: 1.00     Years: 20.00     Pack years: 20.00     Types: Cigarettes     Smokeless tobacco: Never Used   Substance Use Topics     Alcohol use: No     Alcohol/week: 0.0 standard drinks     Comment: rare     Drug use: No        Medications:         methocarbamol (ROBAXIN) 750 MG tablet       oxyCODONE IR (ROXICODONE) 10 MG tablet       predniSONE (DELTASONE) 10 MG tablet          Review of Systems   All other systems reviewed and are negative.      Physical Exam   BP: 136/82  Pulse: 76  Temp: 98.4  F (36.9  C)  Resp: 18  Height: 193 cm (6' 4\")  Weight: 149.7 kg (330 lb)  SpO2: 94 %      Physical Exam  Vitals signs and nursing note reviewed.   Constitutional:       General: He is not in acute distress.     Appearance: Normal appearance. He is obese. He is not ill-appearing, toxic-appearing or diaphoretic.   HENT:      Head: Normocephalic and atraumatic.      Nose: Nose normal.      Mouth/Throat:      Mouth: Mucous membranes are moist.   Eyes:      Extraocular Movements: Extraocular movements intact.      Conjunctiva/sclera: Conjunctivae normal.   Neck:      Musculoskeletal: Neck supple.   Cardiovascular:      Pulses: Normal pulses.   Pulmonary:      Effort: Pulmonary effort is normal. No respiratory distress.   Musculoskeletal:         " General: No deformity.      Comments: Left lower leg with 1 cm scab present to mid shin.  He has faint erythema, warmth, and tenderness distal to the scab.  Mild edema throughout left lower leg noted.  Distal pulses intact.  No posterior calf tenderness.  Right lower leg with very trace edema.  No tenderness.  No erythema or warmth.     Skin:     General: Skin is warm and dry.   Neurological:      Mental Status: He is alert and oriented to person, place, and time. Mental status is at baseline.   Psychiatric:         Mood and Affect: Mood normal.         Behavior: Behavior normal.         ED Course        Procedures      No results found for this or any previous visit (from the past 24 hour(s)).    Medications - No data to display      Assessments & Plan (with Medical Decision Making)  Sidney Plasencia is a 40 year old male who presented to the ED complaining of left leg pain, redness, and swelling for the last 4 days.  History of trauma to the leg 2 weeks ago to the shin. Denies fever, body aches, chest pain or shortness of breath. His vitals on arrival were unremarkable.  Exam today demonstrated a scab to the mid shin with faint erythema, warmth, and tenderness distally.  He had mild to moderate edema throughout the lower leg.  No posterior calf tenderness.  Distal pulse was intact.  I suspect the erythema may be a secondary cellulitic process from the open wound however with the diffuse swelling I am concerned for possible DVT.  An ultrasound was ordered and is pending.  Patient will be signed out to Dr. Motley.  If ultrasound is negative, plan to discharge home on antibiotics for treatment of cellulitis.     I have reviewed the nursing notes.    I have reviewed the findings, diagnosis, plan and need for follow up with the patient.    New Prescriptions    No medications on file       Final diagnoses:   Left leg swelling     Note: Chart documentation done in part with Dragon Voice Recognition software. Although  reviewed after completion, some word and grammatical errors may remain.     11/8/2020   [facility] EMERGENCY DEPT     Yamel Paulino PA-C  11/09/20 0032

## 2020-11-09 NOTE — DISCHARGE INSTRUCTIONS
Your ultrasound was normal, did not see any sign of a blood clot in your leg    Your symptoms are likely due to a skin infection called cellulitis.  You are given a dose of antibiotics here in the ER tonight, and you should fill the prescription that was sent to your pharmacy and continue that for 7 days.  You should continue the antibiotic even if you begin to feel better    Follow-up with your primary provider as needed    Return to the ER if you have any new or worsening symptoms, or if your symptoms are getting worse despite being on antibiotics

## 2020-11-09 NOTE — ED TRIAGE NOTES
Wed onset LLE redness, swelling, warm to the touch and pain noted that has been worsening. Today is having more moderate to severe pain with activity/ambulation.

## 2022-05-25 DIAGNOSIS — K04.7 TOOTH INFECTION: Primary | ICD-10-CM

## 2022-05-25 RX ORDER — PENICILLIN V POTASSIUM 500 MG/1
500 TABLET, FILM COATED ORAL 3 TIMES DAILY
Qty: 30 TABLET | Refills: 0 | Status: SHIPPED | OUTPATIENT
Start: 2022-05-25

## 2022-10-06 DIAGNOSIS — L03.90 CELLULITIS, UNSPECIFIED CELLULITIS SITE: Primary | ICD-10-CM

## 2023-04-27 NOTE — TELEPHONE ENCOUNTER
Talked to patient, he stated he will call back at another time.      Nandini Carbajal    [facility]      Hydroxychloroquine Pregnancy And Lactation Text: This medication has been shown to cause fetal harm but it isn't assigned a Pregnancy Risk Category. There are small amounts excreted in breast milk.

## 2024-02-23 DIAGNOSIS — K04.7 TOOTH INFECTION: Primary | ICD-10-CM

## 2024-05-28 ENCOUNTER — HOSPITAL ENCOUNTER (EMERGENCY)
Facility: CLINIC | Age: 44
Discharge: HOME | End: 2024-05-28
Attending: EMERGENCY MEDICINE | Admitting: EMERGENCY MEDICINE
Payer: COMMERCIAL

## 2024-05-28 ENCOUNTER — APPOINTMENT (OUTPATIENT)
Dept: GENERAL RADIOLOGY | Facility: CLINIC | Age: 44
End: 2024-05-28
Attending: EMERGENCY MEDICINE
Payer: COMMERCIAL

## 2024-05-28 VITALS
TEMPERATURE: 98.1 F | WEIGHT: 315 LBS | BODY MASS INDEX: 43.08 KG/M2 | HEART RATE: 81 BPM | SYSTOLIC BLOOD PRESSURE: 191 MMHG | RESPIRATION RATE: 16 BRPM | OXYGEN SATURATION: 99 % | DIASTOLIC BLOOD PRESSURE: 106 MMHG

## 2024-05-28 DIAGNOSIS — S93.401A SPRAIN OF RIGHT ANKLE, UNSPECIFIED LIGAMENT, INITIAL ENCOUNTER: ICD-10-CM

## 2024-05-28 PROCEDURE — 99283 EMERGENCY DEPT VISIT LOW MDM: CPT | Performed by: EMERGENCY MEDICINE

## 2024-05-28 PROCEDURE — 99283 EMERGENCY DEPT VISIT LOW MDM: CPT

## 2024-05-28 PROCEDURE — 73610 X-RAY EXAM OF ANKLE: CPT | Mod: RT

## 2024-05-28 ASSESSMENT — ACTIVITIES OF DAILY LIVING (ADL): ADLS_ACUITY_SCORE: 35

## 2024-05-28 ASSESSMENT — COLUMBIA-SUICIDE SEVERITY RATING SCALE - C-SSRS
6. HAVE YOU EVER DONE ANYTHING, STARTED TO DO ANYTHING, OR PREPARED TO DO ANYTHING TO END YOUR LIFE?: NO
1. IN THE PAST MONTH, HAVE YOU WISHED YOU WERE DEAD OR WISHED YOU COULD GO TO SLEEP AND NOT WAKE UP?: NO
2. HAVE YOU ACTUALLY HAD ANY THOUGHTS OF KILLING YOURSELF IN THE PAST MONTH?: NO

## 2024-05-28 NOTE — ED PROVIDER NOTES
History     Chief Complaint   Patient presents with    Ankle Pain     HPI  Sidney Plasencia is a 43 year old male who presents to the emergency department secondary to right ankle pain.  He stepped wrong when getting into the boat and developed sharp pain in his medial right ankle.  He is not sure which way his foot turned.  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  It started to swell up and was difficult to walk on it therefore he decided to be evaluated make sure he did not have any fractures.    Allergies:  Allergies   Allergen Reactions    No Known Drug Allergy        Problem List:    Patient Active Problem List    Diagnosis Date Noted    Morbid obesity (H) 11/26/2018     Priority: Medium    Spondylosis of lumbar region without myelopathy or radiculopathy 03/01/2018     Priority: Medium    Pilonidal cyst with abscess likely 03/16/2016     Priority: Medium    Back injury 02/10/2014     Priority: Medium        Past Medical History:    Past Medical History:   Diagnosis Date    NONSPECIFIC MEDICAL HISTORY 2/2003       Past Surgical History:    Past Surgical History:   Procedure Laterality Date    CYSTECTOMY PILONIDAL N/A 3/31/2017    Procedure: CYSTECTOMY PILONIDAL;  Surgeon: Dexter Mendez MD;  [facility]    DISCOGRAPHY N/A 10/26/2017    Procedure: DISCOGRAPHY;  discogram lumbar 2, 3, 4, 5 & S1;  Surgeon: Emmett Galvez MD;  Location: [facility]    HC REMOVE TONSILS/ADENOIDS,<11 Y/O  1990    T & A <12y.o.    INJECT EPIDURAL LUMBAR N/A 4/24/2017    Procedure: INJECT EPIDURAL LUMBAR;  lumbar transforaminal epidural steroid injection Lumbar 4-5 and lumbar 5-Sacral 1;  Surgeon: Dexter Tomlinson MD;  Location: [facility]    INJECT EPIDURAL TRANSFORAMINAL  3/13/2014    Procedure: INJECT EPIDURAL TRANSFORAMINAL;  transforaminal epidural steroid injection right lumbar 4-5, lumbar 5-sacral1;  Surgeon: Emmett Galvez MD;  Location: [facility]    [facility] APPENDECTOMY  2000       Family History:     Family History   Problem Relation Age of Onset    Diabetes Mother     Diabetes Maternal Grandfather     Lung Cancer Paternal Aunt     Coronary Artery Disease No family hx of     Hypertension No family hx of     Hyperlipidemia No family hx of     Cerebrovascular Disease No family hx of        Social History:  Marital Status:   [2]  Social History     Tobacco Use    Smoking status: Every Day     Current packs/day: 1.00     Average packs/day: 1 pack/day for 20.0 years (20.0 ttl pk-yrs)     Types: Cigarettes    Smokeless tobacco: Never   Substance Use Topics    Alcohol use: No     Alcohol/week: 0.0 standard drinks of alcohol     Comment: rare    Drug use: No        Medications:    amoxicillin-clavulanate (AUGMENTIN) 875-125 MG tablet  amoxicillin-clavulanate (AUGMENTIN) 875-125 MG tablet  methocarbamol (ROBAXIN) 750 MG tablet  oxyCODONE IR (ROXICODONE) 10 MG tablet  penicillin V (VEETID) 500 MG tablet  predniSONE (DELTASONE) 10 MG tablet          Review of Systems   All other systems reviewed and are negative.      Physical Exam   BP: (!) 191/106  Pulse: 81  Temp: 98.1  F (36.7  C)  Resp: 16  Weight: (!) 160.5 kg (353 lb 14.2 oz)  SpO2: 99 %      Physical Exam  Vitals and nursing note reviewed.   Constitutional:       General: He is not in acute distress.     Appearance: Normal appearance. He is well-developed. He is not diaphoretic.   HENT:      Head: Normocephalic and atraumatic.      Nose: Nose normal. No congestion.      Mouth/Throat:      Mouth: Mucous membranes are moist.      Pharynx: Oropharynx is clear.   Eyes:      General: No scleral icterus.     Extraocular Movements: Extraocular movements intact.      Conjunctiva/sclera: Conjunctivae normal.      Pupils: Pupils are equal, round, and reactive to light.   Cardiovascular:      Rate and Rhythm: Normal rate and regular rhythm.   Pulmonary:      Effort: Pulmonary effort is normal.   Abdominal:      General: Abdomen is flat.   Musculoskeletal:          General: No tenderness or deformity. Normal range of motion.      Cervical back: Normal range of motion and neck supple.      Comments: There is medial ankle swelling and swelling on the top of the right ankle.  There is some pain with palpation over the medial malleolus and just distal to the medial malleolus.  There is no bruising.   Lymphadenopathy:      Cervical: No cervical adenopathy.   Skin:     General: Skin is warm and dry.      Capillary Refill: Capillary refill takes less than 2 seconds.      Findings: No rash.   Neurological:      General: No focal deficit present.      Mental Status: He is alert and oriented to person, place, and time.      Cranial Nerves: No cranial nerve deficit.      Sensory: No sensory deficit.      Coordination: Coordination normal.         ED Course        Procedures           Results for orders placed or performed during the hospital encounter of 05/28/24 (from the past 24 hour(s))   XR Ankle Right G/E 3 Views    Narrative    XR ANKLE RIGHT G/E 3 VIEWS   5/28/2024 1:31 PM     HISTORY: Trauma, pain  COMPARISON: None.       Impression    IMPRESSION: There is soft tissue swelling over the ankle. No acute,  displaced fracture. There is scattered hindfoot and midfoot  degenerative arthrosis most pronounced at the talonavicular joint  where it appears moderate to advanced.    KEANU FOSS MD         SYSTEM ID:  [id number]       Medications - No data to display    Assessments & Plan (with Medical Decision Making)  Right ankle sprain.  X-rays negative.  Ace wrap applied.  Patient was discharged home in stable condition.  I recommended rest, ice, compression, elevation and close follow-up.  Return to ER precautions and follow-up precautions discussed.  All questions answered prior to discharge     I have reviewed the nursing notes.    I have reviewed the findings, diagnosis, plan and need for follow up with the patient.          Discharge Medication List as of 5/28/2024  2:25 PM           Final diagnoses:   Sprain of right ankle, unspecified ligament, initial encounter       5/28/2024   [facility] EMERGENCY DEPT       Wesley Villeda MD  05/28/24 4834

## 2024-05-28 NOTE — ED TRIAGE NOTES
Pt presents right ankle pain. Pt states he slipped in boat yesterday.      Triage Assessment (Adult)       Row Name 05/28/24 1318          Triage Assessment    Airway WDL WDL        Respiratory WDL    Respiratory WDL WDL        Skin Circulation/Temperature WDL    Skin Circulation/Temperature WDL WDL        Peripheral/Neurovascular WDL    Peripheral Neurovascular WDL WDL        Cognitive/Neuro/Behavioral WDL    Cognitive/Neuro/Behavioral WDL WDL

## 2024-08-22 ENCOUNTER — HOSPITAL ENCOUNTER (EMERGENCY)
Facility: CLINIC | Age: 44
Discharge: HOME | End: 2024-08-22
Attending: STUDENT IN AN ORGANIZED HEALTH CARE EDUCATION/TRAINING PROGRAM | Admitting: STUDENT IN AN ORGANIZED HEALTH CARE EDUCATION/TRAINING PROGRAM
Payer: COMMERCIAL

## 2024-08-22 VITALS
BODY MASS INDEX: 38.36 KG/M2 | RESPIRATION RATE: 20 BRPM | WEIGHT: 315 LBS | TEMPERATURE: 98.1 F | DIASTOLIC BLOOD PRESSURE: 114 MMHG | HEART RATE: 90 BPM | SYSTOLIC BLOOD PRESSURE: 179 MMHG | OXYGEN SATURATION: 98 % | HEIGHT: 76 IN

## 2024-08-22 DIAGNOSIS — K04.7 DENTAL INFECTION: ICD-10-CM

## 2024-08-22 DIAGNOSIS — R51.9 RIGHT SIDED FACIAL PAIN: ICD-10-CM

## 2024-08-22 DIAGNOSIS — I10 ELEVATED BLOOD PRESSURE READING WITH DIAGNOSIS OF HYPERTENSION: ICD-10-CM

## 2024-08-22 PROCEDURE — 99283 EMERGENCY DEPT VISIT LOW MDM: CPT | Performed by: STUDENT IN AN ORGANIZED HEALTH CARE EDUCATION/TRAINING PROGRAM

## 2024-08-22 PROCEDURE — 99284 EMERGENCY DEPT VISIT MOD MDM: CPT | Performed by: STUDENT IN AN ORGANIZED HEALTH CARE EDUCATION/TRAINING PROGRAM

## 2024-08-22 RX ORDER — BUPRENORPHINE 2 MG/1
2 TABLET SUBLINGUAL
COMMUNITY

## 2024-08-22 ASSESSMENT — COLUMBIA-SUICIDE SEVERITY RATING SCALE - C-SSRS
2. HAVE YOU ACTUALLY HAD ANY THOUGHTS OF KILLING YOURSELF IN THE PAST MONTH?: NO
6. HAVE YOU EVER DONE ANYTHING, STARTED TO DO ANYTHING, OR PREPARED TO DO ANYTHING TO END YOUR LIFE?: NO
1. IN THE PAST MONTH, HAVE YOU WISHED YOU WERE DEAD OR WISHED YOU COULD GO TO SLEEP AND NOT WAKE UP?: NO

## 2024-08-22 NOTE — DISCHARGE INSTRUCTIONS
I think you probably do have an dental infection that is leading to facial pain.  It would be worthwhile to increase your antibiotic to a medication called Augmentin.  Please fill this medication and take it as instructed until entirely gone.  Discontinue the penicillin that was prescribed previously.    Use Tylenol/ibuprofen for discomfort along with your baseline chronic pain medication.  Monitor symptoms closely at home.  Follow-up with your primary doctor and dentist as soon as possible.  Return to the emergency department immediately for any fevers despite medications, vision changes, severe headache or facial pain, numbness/tingling/weakness of your extremities.    Your blood pressure was also very elevated today.  Recheck this at home once you have taken your pain medications and follow-up with primary care for recheck.

## 2024-08-22 NOTE — Clinical Note
Sidney Plasencia was seen and treated in our emergency department on 8/22/2024.  He may return to work on 08/24/2024.       If you have any questions or concerns, please don't hesitate to call.      Oswald Pierre MD

## 2024-08-22 NOTE — ED PROVIDER NOTES
History     Chief Complaint   Patient presents with    Dental Pain     HPI  Sidney Plasencia is a 44 year old male with history of hypertension, chronic low back pain who presents for evaluation of right facial pain and swelling.  Patient developed some pain in the right face/cheek starting yesterday.  He has a history of known poor dentition with multiple fractured teeth in the right upper mouth.  However, current discomfort is unique and that he is still able to eat/chew without significant difficulty and typically pain does not center in the face.  Upon waking up this morning he noticed some swelling under the eye.  Otherwise he denies having any fever, chills, vision changes, headache, sore throat, ear pain, trismus, difficulty swallowing or breathing, other complaints.    Allergies:  Allergies   Allergen Reactions    No Known Drug Allergy      Problem List:    Patient Active Problem List    Diagnosis Date Noted    Morbid obesity (H) 11/26/2018     Priority: Medium    Spondylosis of lumbar region without myelopathy or radiculopathy 03/01/2018     Priority: Medium    Pilonidal cyst with abscess likely 03/16/2016     Priority: Medium    Back injury 02/10/2014     Priority: Medium      Past Medical History:    Past Medical History:   Diagnosis Date    NONSPECIFIC MEDICAL HISTORY 2/2003     Past Surgical History:    Past Surgical History:   Procedure Laterality Date    CYSTECTOMY PILONIDAL N/A 3/31/2017    Procedure: CYSTECTOMY PILONIDAL;  Surgeon: Dexter Mendez MD;  [facility]    DISCOGRAPHY N/A 10/26/2017    Procedure: DISCOGRAPHY;  discogram lumbar 2, 3, 4, 5 & S1;  Surgeon: Emmett Galvez MD;  Location: [facility]    HC REMOVE TONSILS/ADENOIDS,<11 Y/O  1990    T & A <12y.o.    INJECT EPIDURAL LUMBAR N/A 4/24/2017    Procedure: INJECT EPIDURAL LUMBAR;  lumbar transforaminal epidural steroid injection Lumbar 4-5 and lumbar 5-Sacral 1;  Surgeon: Dexter Tomlinson MD;  Location: [facility]    INJECT EPIDURAL  "TRANSFORAMINAL  3/13/2014    Procedure: INJECT EPIDURAL TRANSFORAMINAL;  transforaminal epidural steroid injection right lumbar 4-5, lumbar 5-sacral1;  Surgeon: Emmett Galvez MD;  Location:  OR    [facility] APPENDECTOMY  2000     Family History:    Family History   Problem Relation Age of Onset    Diabetes Mother     Diabetes Maternal Grandfather     Lung Cancer Paternal Aunt     Coronary Artery Disease No family hx of     Hypertension No family hx of     Hyperlipidemia No family hx of     Cerebrovascular Disease No family hx of      Social History:  Marital Status:   [2]  Social History     Tobacco Use    Smoking status: Every Day     Current packs/day: 1.00     Average packs/day: 1 pack/day for 20.0 years (20.0 ttl pk-yrs)     Types: Cigarettes    Smokeless tobacco: Never   Substance Use Topics    Alcohol use: No     Alcohol/week: 0.0 standard drinks of alcohol     Comment: rare    Drug use: No      Medications:    amoxicillin-clavulanate (AUGMENTIN) 875-125 MG tablet  oxyCODONE IR (ROXICODONE) 10 MG tablet  penicillin V (VEETID) 500 MG tablet  buprenorphine (SUBUTEX) 2 MG SUBL sublingual tablet      Review of Systems   All other systems reviewed and are negative.  See HPI.    Physical Exam   BP: (!) 179/114  Pulse: 90  Temp: 98.1  F (36.7  C)  Resp: 18  Height: 193 cm (6' 4\")  Weight: (!) 158.8 kg (350 lb)  SpO2: 98 %    Physical Exam  Vitals and nursing note reviewed.   Constitutional:       General: He is not in acute distress.     Appearance: Normal appearance. He is not toxic-appearing.      Comments: Slightly anxious.  Otherwise no acute distress.   HENT:      Head: Normocephalic.      Comments: Patient has some mild soft tissue swelling to the right cheek/face under the eye.  No orbital rim tenderness or significant periorbital edema.  No erythema to the skin but it is mildly tender to palpation in the maxillary region.  No fluctuance or induration.  No facial instability.  No septal hematoma.     " Right Ear: Tympanic membrane and ear canal normal.      Left Ear: Tympanic membrane and ear canal normal.      Nose: Nose normal.      Mouth/Throat:      Mouth: Mucous membranes are moist.      Pharynx: No oropharyngeal exudate or posterior oropharyngeal erythema.      Comments: Generally poor addition with multiple fractured and avulsed teeth in the right upper mouth.  No obvious tenderness to palpation along the gumline or any signs of superficial abscess that would be amenable to drainage.  No trismus.  Uvula is midline.  Eyes:      General: No scleral icterus.     Extraocular Movements: Extraocular movements intact.      Conjunctiva/sclera: Conjunctivae normal.      Pupils: Pupils are equal, round, and reactive to light.      Comments: No proptosis.  Extraocular movements are full and nonpainful.   Cardiovascular:      Rate and Rhythm: Normal rate and regular rhythm.      Pulses: Normal pulses.      Heart sounds: Normal heart sounds.   Pulmonary:      Effort: Pulmonary effort is normal. No respiratory distress.      Breath sounds: Normal breath sounds.   Abdominal:      Palpations: Abdomen is soft.      Tenderness: There is no abdominal tenderness.   Musculoskeletal:         General: No tenderness or deformity. Normal range of motion.      Cervical back: Normal range of motion and neck supple. No tenderness.   Skin:     General: Skin is warm.      Capillary Refill: Capillary refill takes less than 2 seconds.   Neurological:      General: No focal deficit present.      Mental Status: He is alert and oriented to person, place, and time.      Cranial Nerves: No cranial nerve deficit.      Sensory: No sensory deficit.      Motor: No weakness.      Coordination: Coordination normal.      Gait: Gait normal.      Comments: Cranial nerves intact, moving all extremity spontaneously with normal strength and coordination.   Psychiatric:         Mood and Affect: Mood normal.         ED Course        Procedures            No  results found for this or any previous visit (from the past 24 hour(s)).    Medications - No data to display    Assessments & Plan (with Medical Decision Making)     I have reviewed the nursing notes.    I have reviewed the findings, diagnosis, plan and need for follow up with the patient.  Medical Decision Making  Sidney Plasencia is a 44 year old male with history of hypertension, chronic low back pain who presents for evaluation of right facial pain and swelling.  Hypertensive on arrival, 179/114.  However, patient takes chronic narcotics at baseline and has not done so yet this morning.  No neurological deficits and he denies having any chest pain, shortness of breath, or abdominal pain.  Exam is most significant for a focal area of tenderness to the right cheek area below the eye as described above.  There is some swelling just under the eye, but he has no proptosis and extraocular movements are full, nonpainful.  Dentition is generally poor and he has multiple fractured teeth to the area, but no signs of drainable abscess.  I do suspect he probably has an infection from a dental source to the area.  He has been taking penicillin since yesterday without relief.  Patient has an appointment with his dentist to have these teeth removed but it is not for a few months still.  I see no evidence of orbital cellulitis or appreciate any neurological deficits to raise concern for developing abscess.  However, I think it would be worthwhile to escalate antibiotics to Augmentin to cover for sinusitis, cellulitis, dental abscess.  Patient has multiple chronic pain medications at home and I advised him to recheck his blood pressure after taking these meds.  Patient will follow-up with primary care and his dentist to soon as possible, agrees to return sooner for any new or worsening symptoms.    Discharge Medication List as of 8/22/2024  6:45 AM        START taking these medications    Details   amoxicillin-clavulanate  (AUGMENTIN) 875-125 MG tablet Take 1 tablet by mouth 2 times daily for 10 days., Disp-20 tablet, R-0, E-Prescribe           Final diagnoses:   Right sided facial pain   Dental infection   Elevated blood pressure reading with diagnosis of hypertension     8/22/2024   [facility] EMERGENCY DEPT       Oswald Pierre MD  08/22/24 0691

## 2024-10-25 ENCOUNTER — TELEPHONE (OUTPATIENT)
Dept: FAMILY MEDICINE | Facility: CLINIC | Age: 44
End: 2024-10-25

## 2024-10-25 ENCOUNTER — OFFICE VISIT (OUTPATIENT)
Dept: FAMILY MEDICINE | Facility: CLINIC | Age: 44
End: 2024-10-25
Payer: COMMERCIAL

## 2024-10-25 VITALS
HEIGHT: 76 IN | DIASTOLIC BLOOD PRESSURE: 90 MMHG | WEIGHT: 315 LBS | BODY MASS INDEX: 38.36 KG/M2 | SYSTOLIC BLOOD PRESSURE: 144 MMHG | TEMPERATURE: 97.7 F | HEART RATE: 83 BPM | RESPIRATION RATE: 14 BRPM | OXYGEN SATURATION: 97 %

## 2024-10-25 DIAGNOSIS — Z13.220 LIPID SCREENING: ICD-10-CM

## 2024-10-25 DIAGNOSIS — M47.816 SPONDYLOSIS OF LUMBAR REGION WITHOUT MYELOPATHY OR RADICULOPATHY: ICD-10-CM

## 2024-10-25 DIAGNOSIS — I10 BENIGN ESSENTIAL HYPERTENSION: Primary | ICD-10-CM

## 2024-10-25 DIAGNOSIS — Z13.1 SCREENING FOR DIABETES MELLITUS: ICD-10-CM

## 2024-10-25 DIAGNOSIS — Z72.0 TOBACCO ABUSE DISORDER: ICD-10-CM

## 2024-10-25 DIAGNOSIS — E66.01 MORBID OBESITY (H): ICD-10-CM

## 2024-10-25 LAB
ALBUMIN SERPL BCG-MCNC: 4.5 G/DL (ref 3.5–5.2)
ALP SERPL-CCNC: 89 U/L (ref 40–150)
ALT SERPL W P-5'-P-CCNC: 30 U/L (ref 0–70)
ANION GAP SERPL CALCULATED.3IONS-SCNC: 11 MMOL/L (ref 7–15)
AST SERPL W P-5'-P-CCNC: 24 U/L (ref 0–45)
BILIRUB SERPL-MCNC: 0.5 MG/DL
BUN SERPL-MCNC: 13.8 MG/DL (ref 6–20)
CALCIUM SERPL-MCNC: 9.3 MG/DL (ref 8.8–10.4)
CHLORIDE SERPL-SCNC: 102 MMOL/L (ref 98–107)
CHOLEST SERPL-MCNC: 164 MG/DL
CREAT SERPL-MCNC: 0.81 MG/DL (ref 0.67–1.17)
EGFRCR SERPLBLD CKD-EPI 2021: >90 ML/MIN/1.73M2
ERYTHROCYTE [DISTWIDTH] IN BLOOD BY AUTOMATED COUNT: 13.1 % (ref 10–15)
EST. AVERAGE GLUCOSE BLD GHB EST-MCNC: 108 MG/DL
FASTING STATUS PATIENT QL REPORTED: YES
FASTING STATUS PATIENT QL REPORTED: YES
GLUCOSE SERPL-MCNC: 104 MG/DL (ref 70–99)
HBA1C MFR BLD: 5.4 %
HCO3 SERPL-SCNC: 23 MMOL/L (ref 22–29)
HCT VFR BLD AUTO: 45.2 % (ref 40–53)
HDLC SERPL-MCNC: 37 MG/DL
HGB BLD-MCNC: 15.8 G/DL (ref 13.3–17.7)
LDLC SERPL CALC-MCNC: 100 MG/DL
MCH RBC QN AUTO: 29.4 PG (ref 26.5–33)
MCHC RBC AUTO-ENTMCNC: 35 G/DL (ref 31.5–36.5)
MCV RBC AUTO: 84 FL (ref 78–100)
NONHDLC SERPL-MCNC: 127 MG/DL
PLATELET # BLD AUTO: 232 10E3/UL (ref 150–450)
POTASSIUM SERPL-SCNC: 4.3 MMOL/L (ref 3.4–5.3)
PROT SERPL-MCNC: 7.4 G/DL (ref 6.4–8.3)
RBC # BLD AUTO: 5.38 10E6/UL (ref 4.4–5.9)
SODIUM SERPL-SCNC: 136 MMOL/L (ref 135–145)
TRIGL SERPL-MCNC: 136 MG/DL
TSH SERPL DL<=0.005 MIU/L-ACNC: 1.51 UIU/ML (ref 0.3–4.2)
WBC # BLD AUTO: 9.4 10E3/UL (ref 4–11)

## 2024-10-25 PROCEDURE — 84443 ASSAY THYROID STIM HORMONE: CPT | Performed by: STUDENT IN AN ORGANIZED HEALTH CARE EDUCATION/TRAINING PROGRAM

## 2024-10-25 PROCEDURE — 80053 COMPREHEN METABOLIC PANEL: CPT | Performed by: STUDENT IN AN ORGANIZED HEALTH CARE EDUCATION/TRAINING PROGRAM

## 2024-10-25 PROCEDURE — 36415 COLL VENOUS BLD VENIPUNCTURE: CPT | Performed by: STUDENT IN AN ORGANIZED HEALTH CARE EDUCATION/TRAINING PROGRAM

## 2024-10-25 PROCEDURE — 99204 OFFICE O/P NEW MOD 45 MIN: CPT | Performed by: STUDENT IN AN ORGANIZED HEALTH CARE EDUCATION/TRAINING PROGRAM

## 2024-10-25 PROCEDURE — 80061 LIPID PANEL: CPT | Performed by: STUDENT IN AN ORGANIZED HEALTH CARE EDUCATION/TRAINING PROGRAM

## 2024-10-25 PROCEDURE — G2211 COMPLEX E/M VISIT ADD ON: HCPCS | Performed by: STUDENT IN AN ORGANIZED HEALTH CARE EDUCATION/TRAINING PROGRAM

## 2024-10-25 PROCEDURE — 85027 COMPLETE CBC AUTOMATED: CPT | Performed by: STUDENT IN AN ORGANIZED HEALTH CARE EDUCATION/TRAINING PROGRAM

## 2024-10-25 PROCEDURE — 83036 HEMOGLOBIN GLYCOSYLATED A1C: CPT | Performed by: STUDENT IN AN ORGANIZED HEALTH CARE EDUCATION/TRAINING PROGRAM

## 2024-10-25 RX ORDER — TOPIRAMATE 100 MG/1
100 TABLET, FILM COATED ORAL 2 TIMES DAILY
COMMUNITY

## 2024-10-25 RX ORDER — CYCLOBENZAPRINE HCL 10 MG
TABLET ORAL
COMMUNITY
Start: 2024-10-10

## 2024-10-25 RX ORDER — LOSARTAN POTASSIUM 50 MG/1
50 TABLET ORAL DAILY
Qty: 90 TABLET | Refills: 3 | Status: SHIPPED | OUTPATIENT
Start: 2024-10-25

## 2024-10-25 NOTE — TELEPHONE ENCOUNTER
PRIOR AUTHORIZATION DENIED    Medication: SEMAGLUTIDE-WEIGHT MANAGEMENT 0.25 MG/0.5ML SC SOAJ  Insurance Company: [employer] [place] - Phone [phone] Fax [phone]  Denial Date: 10/25/2024  Denial Reason(s):     Appeal Information:     Patient Notified: No

## 2024-10-25 NOTE — PROGRESS NOTES
Assessment & Plan   Problem List Items Addressed This Visit          Digestive    Morbid obesity (H)    Relevant Medications    Semaglutide-Weight Management (WEGOVY) 0.25 MG/0.5ML pen       Musculoskeletal and Integumentary    Spondylosis of lumbar region without myelopathy or radiculopathy    Relevant Medications    cyclobenzaprine (FLEXERIL) 10 MG tablet     Other Visit Diagnoses       Benign essential hypertension    -  Primary    Relevant Medications    losartan (COZAAR) 50 MG tablet    Other Relevant Orders    Comprehensive metabolic panel (BMP + Alb, Alk Phos, ALT, AST, Total. Bili, TP)    TSH with free T4 reflex    CBC with platelets (Completed)    Lipid screening        Relevant Orders    Lipid panel reflex to direct LDL Fasting    Tobacco abuse disorder        Relevant Medications    Semaglutide-Weight Management (WEGOVY) 0.25 MG/0.5ML pen    Screening for diabetes mellitus        Relevant Orders    Hemoglobin A1c           Given continued elevated blood pressures will start treated with losartan 50 mg now.  Obtain labs today for initial hypertension workup including A1c and lipids as he has not had labs for some time.  Reports good exercise tolerance without exertional symptoms that would be worrisome for cardiac abnormality now.  He will consider smoking cessation but does not want further help at this time.  We did discuss his weight with importance of diet lifestyle changes.  He is interested in working on this as well as medication options.  He would like trial of semaglutide now with potential side effects and contraindications discussed.  He would maybe consider trying to pay out-of-pocket if needed in the future.  Plan to follow-up for nursing recheck in 2 weeks and if still high >140/90, would increase losartan to 100 mg. Follow up with me in 3 months and pending lab results.        Nicotine/Tobacco Cessation  He reports that he has been smoking cigarettes. He has a 20 pack-year smoking history.  "He has never used smokeless tobacco.  Nicotine/Tobacco Cessation Plan  Information offered: Patient not interested at this time      BMI  Estimated body mass index is 45 kg/m  as calculated from the following:    Height as of this encounter: 1.92 m (6' 3.6\").    Weight as of this encounter: 165.9 kg (365 lb 12.8 oz).   Weight management plan: Discussed healthy diet and exercise guidelines        Sugar Little is a 44 year old, presenting for the following health issues:  Hypertension (Elevated BP. )        10/25/2024     9:24 AM   Additional Questions   Roomed by Lorrie     History of Present Illness       Reason for visit:  High blood pressure  Symptom onset:  More than a month  Symptoms include:  None  Had these symptoms before:  No  What makes it worse:  No  What makes it better:  No   He is taking medications regularly.     Patient overall feeling well but has continued to have elevated blood pressures over the last few years and specifically on recent visits to his pain clinic.  Patient with history of back surgery and on Subutex and chronic oxycodone.  No chest pain or respiratory symptoms.  Continues to smoke and is considering quitting but not interested in help today.  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  No headache changes in vision or focal deficits.  He has not had lab work for some time.  He is interested in weight loss and was started on topiramate by pain clinic but has not noted change and he does not take consistently.      Review of Systems  Constitutional, HEENT, cardiovascular, pulmonary, GI, , musculoskeletal, neuro, skin, endocrine and psych systems are negative, except as otherwise noted.      Objective    BP (!) 144/90   Pulse 83   Temp 97.7  F (36.5  C) (Temporal)   Resp 14   Ht 1.92 m (6' 3.6\")   Wt (!) 165.9 kg (365 lb 12.8 oz)   SpO2 97%   BMI 45.00 kg/m    Body mass index is 45 kg/m .  Physical Exam   GENERAL: alert and no distress  EYES: Eyes " grossly normal to inspection, PERRL and conjunctivae and sclerae normal  HENT:  nose and mouth without ulcers or lesions, poor dentition  NECK: no adenopathy, no asymmetry, masses, or scars  RESP: lungs clear to auscultation - no rales, rhonchi or wheezes  CV: regular rate and rhythm, normal S1 S2, no S3 or S4, no murmur, click or rub, no peripheral edema  ABDOMEN: soft, nontender, no hepatosplenomegaly, no masses and bowel sounds normal  MS: no gross musculoskeletal defects noted, no edema  SKIN: no suspicious lesions or rashes  NEURO: Normal strength and tone, mentation intact and speech normal  PSYCH: mentation appears normal, affect normal/bright  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

## 2024-11-05 NOTE — TELEPHONE ENCOUNTER
Attempted calling patient, mailbox is full, unable to leave message.    Patient should also get a letter in the mail regarding the denial.    Closing encounter.    Chayito Truong XRO/

## 2024-11-05 NOTE — TELEPHONE ENCOUNTER
Please inform patient. He can speak with insurance or pay out of pocket if he would like. Can try through compounding pharmacy.

## 2024-11-11 ENCOUNTER — ALLIED HEALTH/NURSE VISIT (OUTPATIENT)
Dept: FAMILY MEDICINE | Facility: CLINIC | Age: 44
End: 2024-11-11
Payer: COMMERCIAL

## 2024-11-11 VITALS — SYSTOLIC BLOOD PRESSURE: 136 MMHG | DIASTOLIC BLOOD PRESSURE: 82 MMHG

## 2024-11-11 DIAGNOSIS — I10 BENIGN ESSENTIAL HYPERTENSION: Primary | ICD-10-CM

## 2024-11-11 PROCEDURE — 99207 PR NO CHARGE NURSE ONLY: CPT

## 2024-11-11 NOTE — PROGRESS NOTES
Sidney Plasencia is a 44 year old patient who comes in today for a Blood Pressure check.  Initial BP:  /82      Data Unavailable  Disposition: follow-up as previously indicated by provider and results routed to provider    Mikala Sloan MA 11/11/2024    
stretcher

## (undated) DEVICE — NDL ECLIPSE 18GA 1.5"

## (undated) DEVICE — NDL SPINAL 22GA 7" QUINCKE 405149

## (undated) DEVICE — GLOVE PROTEXIS W/NEU-THERA 7.0  2D73TE70

## (undated) DEVICE — PACK BASIC SET-UP 9101

## (undated) DEVICE — PREP SKIN SCRUB TRAY 4461A

## (undated) DEVICE — Device

## (undated) DEVICE — DRAPE LAP PEDS 89209

## (undated) DEVICE — NDL COUNTER 10CT

## (undated) DEVICE — TUBING EXTENSION SET MICROBORE 8.2" LL 7N8310

## (undated) DEVICE — GLOVE PROTEXIS W/NEU-THERA 8.0  2D73TE80

## (undated) DEVICE — TRAY SINGLE DOSE EPIDURAL ANESTHESIA

## (undated) DEVICE — SYR 03ML LL W/O NDL

## (undated) DEVICE — TRAY EPIDURAL 18GA SINGLE SHOT 406092

## (undated) DEVICE — SYR 05ML LL W/O NDL

## (undated) DEVICE — GLOVE PROTEXIS W/NEU-THERA 7.5  2D73TE75

## (undated) DEVICE — DRSG ABDOMINAL 07 1/2X8" 7197D

## (undated) DEVICE — NDL EPIDURAL TUOHY 20GA 3.5" LF DISP 183A12

## (undated) DEVICE — GOWN IMPERVIOUS ZONED LG

## (undated) DEVICE — SU VICRYL 3-0 SH 27" UND J416H

## (undated) DEVICE — SU ETHILON 3-0 PS-2 18" 1669H

## (undated) DEVICE — NDL SPINAL 26GA 3 1/2"

## (undated) DEVICE — DRAPE C-ARM

## (undated) DEVICE — ESU GROUND PAD UNIVERSAL W/O CORD

## (undated) DEVICE — DRAPE LAP W/ARMBOARD 29410

## (undated) DEVICE — TUBING EXTENSION SET MICROBORE 21CM LL 6N8374

## (undated) DEVICE — DRAPE STERI TOWEL SM 1000

## (undated) DEVICE — PACK MINOR PROCEDURE CUSTOM

## (undated) RX ORDER — SODIUM CHLORIDE 9 MG/ML
INJECTION, SOLUTION INTRAVENOUS
Status: DISPENSED
Start: 2017-10-26

## (undated) RX ORDER — ONDANSETRON 2 MG/ML
INJECTION INTRAMUSCULAR; INTRAVENOUS
Status: DISPENSED
Start: 2017-03-31

## (undated) RX ORDER — GLYCOPYRROLATE 0.2 MG/ML
INJECTION INTRAMUSCULAR; INTRAVENOUS
Status: DISPENSED
Start: 2017-03-31

## (undated) RX ORDER — LIDOCAINE HYDROCHLORIDE 40 MG/ML
INJECTION, SOLUTION RETROBULBAR
Status: DISPENSED
Start: 2017-10-26

## (undated) RX ORDER — PROPOFOL 10 MG/ML
INJECTION, EMULSION INTRAVENOUS
Status: DISPENSED
Start: 2017-10-26

## (undated) RX ORDER — LIDOCAINE HYDROCHLORIDE 10 MG/ML
INJECTION, SOLUTION EPIDURAL; INFILTRATION; INTRACAUDAL; PERINEURAL
Status: DISPENSED
Start: 2017-03-31

## (undated) RX ORDER — KETOROLAC TROMETHAMINE 30 MG/ML
INJECTION, SOLUTION INTRAMUSCULAR; INTRAVENOUS
Status: DISPENSED
Start: 2017-10-26

## (undated) RX ORDER — LIDOCAINE HYDROCHLORIDE 20 MG/ML
INJECTION, SOLUTION EPIDURAL; INFILTRATION; INTRACAUDAL; PERINEURAL
Status: DISPENSED
Start: 2017-03-31

## (undated) RX ORDER — NEOSTIGMINE METHYLSULFATE 1 MG/ML
VIAL (ML) INJECTION
Status: DISPENSED
Start: 2017-03-31

## (undated) RX ORDER — HYDROMORPHONE HYDROCHLORIDE 1 MG/ML
INJECTION, SOLUTION INTRAMUSCULAR; INTRAVENOUS; SUBCUTANEOUS
Status: DISPENSED
Start: 2017-10-26

## (undated) RX ORDER — KETOROLAC TROMETHAMINE 30 MG/ML
INJECTION, SOLUTION INTRAMUSCULAR; INTRAVENOUS
Status: DISPENSED
Start: 2017-03-31

## (undated) RX ORDER — BUPIVACAINE HYDROCHLORIDE AND EPINEPHRINE 2.5; 5 MG/ML; UG/ML
INJECTION, SOLUTION EPIDURAL; INFILTRATION; INTRACAUDAL; PERINEURAL
Status: DISPENSED
Start: 2017-03-31

## (undated) RX ORDER — DEXAMETHASONE SODIUM PHOSPHATE 10 MG/ML
INJECTION INTRAMUSCULAR; INTRAVENOUS
Status: DISPENSED
Start: 2017-03-31

## (undated) RX ORDER — FENTANYL CITRATE 50 UG/ML
INJECTION, SOLUTION INTRAMUSCULAR; INTRAVENOUS
Status: DISPENSED
Start: 2017-03-31

## (undated) RX ORDER — PROPOFOL 10 MG/ML
INJECTION, EMULSION INTRAVENOUS
Status: DISPENSED
Start: 2017-03-31

## (undated) RX ORDER — DEXMEDETOMIDINE HYDROCHLORIDE 100 UG/ML
INJECTION, SOLUTION INTRAVENOUS
Status: DISPENSED
Start: 2017-03-31